# Patient Record
Sex: MALE | Race: WHITE | Employment: OTHER
[De-identification: names, ages, dates, MRNs, and addresses within clinical notes are randomized per-mention and may not be internally consistent; named-entity substitution may affect disease eponyms.]

---

## 2017-02-01 ENCOUNTER — OFFICE VISIT (OUTPATIENT)
Dept: INTERNAL MEDICINE | Facility: CLINIC | Age: 72
End: 2017-02-01

## 2017-02-01 VITALS
DIASTOLIC BLOOD PRESSURE: 80 MMHG | HEIGHT: 70 IN | RESPIRATION RATE: 14 BRPM | SYSTOLIC BLOOD PRESSURE: 120 MMHG | BODY MASS INDEX: 35.07 KG/M2 | WEIGHT: 245 LBS

## 2017-02-01 DIAGNOSIS — I10 ESSENTIAL HYPERTENSION: ICD-10-CM

## 2017-02-01 DIAGNOSIS — M48.061 SPINAL STENOSIS OF LUMBAR REGION AT MULTIPLE LEVELS: ICD-10-CM

## 2017-02-01 DIAGNOSIS — R07.89 ATYPICAL CHEST PAIN: ICD-10-CM

## 2017-02-01 DIAGNOSIS — Z12.11 ENCOUNTER FOR SCREENING COLONOSCOPY: Primary | ICD-10-CM

## 2017-02-01 DIAGNOSIS — I10 ESSENTIAL HYPERTENSION, BENIGN: ICD-10-CM

## 2017-02-01 DIAGNOSIS — G47.33 OSA (OBSTRUCTIVE SLEEP APNEA): Primary | ICD-10-CM

## 2017-02-01 PROCEDURE — 99213 OFFICE O/P EST LOW 20 MIN: CPT | Performed by: INTERNAL MEDICINE

## 2017-02-01 RX ORDER — AMLODIPINE BESYLATE 5 MG/1
5 TABLET ORAL DAILY
Qty: 90 TABLET | Refills: 3 | Status: SHIPPED | OUTPATIENT
Start: 2017-02-01 | End: 2017-10-23 | Stop reason: SDUPTHER

## 2017-02-01 RX ORDER — LABETALOL 100 MG/1
50 TABLET, FILM COATED ORAL 2 TIMES DAILY
Qty: 30 TABLET | Refills: 1 | Status: SHIPPED | OUTPATIENT
Start: 2017-02-01 | End: 2017-10-23 | Stop reason: SDUPTHER

## 2017-02-01 RX ORDER — VALSARTAN AND HYDROCHLOROTHIAZIDE 320; 25 MG/1; MG/1
1 TABLET, FILM COATED ORAL DAILY
Qty: 90 TABLET | Refills: 3 | Status: SHIPPED | OUTPATIENT
Start: 2017-02-01 | End: 2017-10-04 | Stop reason: SDUPTHER

## 2017-02-01 ASSESSMENT — ENCOUNTER SYMPTOMS
RESPIRATORY NEGATIVE: 1
BACK PAIN: 1
ABDOMINAL PAIN: 0

## 2017-02-04 RX ORDER — AMLODIPINE BESYLATE 5 MG/1
TABLET ORAL
Qty: 90 TABLET | Refills: 3 | Status: SHIPPED | OUTPATIENT
Start: 2017-02-04 | End: 2017-05-10 | Stop reason: CLARIF

## 2017-02-04 RX ORDER — LABETALOL 200 MG/1
TABLET, FILM COATED ORAL
Qty: 90 TABLET | Refills: 3 | Status: SHIPPED | OUTPATIENT
Start: 2017-02-04 | End: 2017-05-10 | Stop reason: ALTCHOICE

## 2017-03-15 DIAGNOSIS — Z12.11 COLON CANCER SCREENING: Primary | ICD-10-CM

## 2017-03-21 RX ORDER — POLYETHYLENE GLYCOL 3350 17 G/17G
POWDER, FOR SOLUTION ORAL
Qty: 255 G | Refills: 0 | Status: SHIPPED | OUTPATIENT
Start: 2017-03-21 | End: 2017-04-14

## 2017-05-10 ENCOUNTER — HOSPITAL ENCOUNTER (OUTPATIENT)
Age: 72
Discharge: HOME | End: 2017-05-10
Payer: MEDICARE

## 2017-05-10 ENCOUNTER — OFFICE VISIT (OUTPATIENT)
Dept: INTERNAL MEDICINE CLINIC | Age: 72
End: 2017-05-10
Payer: MEDICARE

## 2017-05-10 VITALS
HEIGHT: 70 IN | BODY MASS INDEX: 35.07 KG/M2 | DIASTOLIC BLOOD PRESSURE: 82 MMHG | SYSTOLIC BLOOD PRESSURE: 130 MMHG | WEIGHT: 245 LBS | RESPIRATION RATE: 14 BRPM

## 2017-05-10 DIAGNOSIS — R53.81 OTHER MALAISE AND FATIGUE: ICD-10-CM

## 2017-05-10 DIAGNOSIS — I10 ESSENTIAL HYPERTENSION, BENIGN: ICD-10-CM

## 2017-05-10 DIAGNOSIS — M48.061 SPINAL STENOSIS OF LUMBAR REGION AT MULTIPLE LEVELS: Primary | ICD-10-CM

## 2017-05-10 DIAGNOSIS — R53.83 OTHER MALAISE AND FATIGUE: ICD-10-CM

## 2017-05-10 DIAGNOSIS — I20.8 ANGINA OF EFFORT (HCC): ICD-10-CM

## 2017-05-10 LAB
-: NORMAL
ABSOLUTE EOS #: 0.2 K/UL (ref 0–0.4)
ABSOLUTE LYMPH #: 1.6 K/UL (ref 1–4.8)
ABSOLUTE MONO #: 0.5 K/UL (ref 0.1–1.2)
ALBUMIN SERPL-MCNC: 4.1 G/DL (ref 3.5–5.2)
ALBUMIN/GLOBULIN RATIO: 1.6 (ref 1–2.5)
ALP BLD-CCNC: 54 U/L (ref 40–129)
ALT SERPL-CCNC: 21 U/L (ref 5–41)
AMORPHOUS: NORMAL
ANION GAP SERPL CALCULATED.3IONS-SCNC: 11 MMOL/L (ref 9–17)
AST SERPL-CCNC: 22 U/L
BACTERIA: NORMAL
BASOPHILS # BLD: 1 %
BASOPHILS ABSOLUTE: 0 K/UL (ref 0–0.2)
BILIRUB SERPL-MCNC: 0.32 MG/DL (ref 0.3–1.2)
BILIRUBIN URINE: NEGATIVE
BUN BLDV-MCNC: 16 MG/DL (ref 8–23)
BUN/CREAT BLD: NORMAL (ref 9–20)
CALCIUM SERPL-MCNC: 9.1 MG/DL (ref 8.6–10.4)
CASTS UA: NORMAL /LPF (ref 0–8)
CHLORIDE BLD-SCNC: 101 MMOL/L (ref 98–107)
CHOLESTEROL/HDL RATIO: 5.5
CHOLESTEROL: 243 MG/DL
CO2: 25 MMOL/L (ref 20–31)
COLOR: YELLOW
CREAT SERPL-MCNC: 0.79 MG/DL (ref 0.7–1.2)
CRYSTALS, UA: NORMAL /HPF
DIFFERENTIAL TYPE: ABNORMAL
EOSINOPHILS RELATIVE PERCENT: 4 %
EPITHELIAL CELLS UA: NORMAL /HPF (ref 0–5)
GFR AFRICAN AMERICAN: >60 ML/MIN
GFR NON-AFRICAN AMERICAN: >60 ML/MIN
GFR SERPL CREATININE-BSD FRML MDRD: NORMAL ML/MIN/{1.73_M2}
GFR SERPL CREATININE-BSD FRML MDRD: NORMAL ML/MIN/{1.73_M2}
GLUCOSE BLD-MCNC: 97 MG/DL (ref 70–99)
GLUCOSE URINE: NEGATIVE
HCT VFR BLD CALC: 38.7 % (ref 41–53)
HDLC SERPL-MCNC: 44 MG/DL
HEMOGLOBIN: 12.8 G/DL (ref 13.5–17.5)
KETONES, URINE: NEGATIVE
LDL CHOLESTEROL: 158 MG/DL (ref 0–130)
LEUKOCYTE ESTERASE, URINE: NEGATIVE
LYMPHOCYTES # BLD: 32 %
MCH RBC QN AUTO: 26.3 PG (ref 26–34)
MCHC RBC AUTO-ENTMCNC: 33.2 G/DL (ref 31–37)
MCV RBC AUTO: 79.3 FL (ref 80–100)
MONOCYTES # BLD: 9 %
MUCUS: NORMAL
NITRITE, URINE: NEGATIVE
OTHER OBSERVATIONS UA: NORMAL
PDW BLD-RTO: 14.9 % (ref 12.5–15.4)
PH UA: 7 (ref 5–8)
PLATELET # BLD: 239 K/UL (ref 140–450)
PLATELET ESTIMATE: ABNORMAL
PMV BLD AUTO: 8.2 FL (ref 6–12)
POTASSIUM SERPL-SCNC: 4 MMOL/L (ref 3.7–5.3)
PROTEIN UA: NEGATIVE
RBC # BLD: 4.87 M/UL (ref 4.5–5.9)
RBC # BLD: ABNORMAL 10*6/UL
RBC UA: NORMAL /HPF (ref 0–4)
RENAL EPITHELIAL, UA: NORMAL /HPF
SEDIMENTATION RATE, ERYTHROCYTE: 7 MM (ref 0–10)
SEG NEUTROPHILS: 54 %
SEGMENTED NEUTROPHILS ABSOLUTE COUNT: 2.7 K/UL (ref 1.8–7.7)
SODIUM BLD-SCNC: 137 MMOL/L (ref 135–144)
SPECIFIC GRAVITY UA: 1.01 (ref 1–1.03)
TOTAL PROTEIN: 6.6 G/DL (ref 6.4–8.3)
TRICHOMONAS: NORMAL
TRIGL SERPL-MCNC: 204 MG/DL
TSH SERPL DL<=0.05 MIU/L-ACNC: 1.59 MIU/L (ref 0.3–5)
TURBIDITY: CLEAR
URINE HGB: NEGATIVE
UROBILINOGEN, URINE: NORMAL
VLDLC SERPL CALC-MCNC: ABNORMAL MG/DL (ref 1–30)
WBC # BLD: 5 K/UL (ref 3.5–11)
WBC # BLD: ABNORMAL 10*3/UL
WBC UA: NORMAL /HPF (ref 0–5)
YEAST: NORMAL

## 2017-05-10 PROCEDURE — 3288F FALL RISK ASSESSMENT DOCD: CPT | Performed by: INTERNAL MEDICINE

## 2017-05-10 PROCEDURE — G8510 SCR DEP NEG, NO PLAN REQD: HCPCS | Performed by: INTERNAL MEDICINE

## 2017-05-10 PROCEDURE — 99214 OFFICE O/P EST MOD 30 MIN: CPT | Performed by: INTERNAL MEDICINE

## 2017-05-10 ASSESSMENT — ENCOUNTER SYMPTOMS
ABDOMINAL PAIN: 0
BACK PAIN: 1
SHORTNESS OF BREATH: 1

## 2017-05-10 ASSESSMENT — PATIENT HEALTH QUESTIONNAIRE - PHQ9
SUM OF ALL RESPONSES TO PHQ9 QUESTIONS 1 & 2: 0
2. FEELING DOWN, DEPRESSED OR HOPELESS: 0
SUM OF ALL RESPONSES TO PHQ QUESTIONS 1-9: 0
1. LITTLE INTEREST OR PLEASURE IN DOING THINGS: 0

## 2017-07-10 RX ORDER — DULOXETIN HYDROCHLORIDE 30 MG/1
CAPSULE, DELAYED RELEASE ORAL
Qty: 30 CAPSULE | Refills: 5 | Status: SHIPPED | OUTPATIENT
Start: 2017-07-10 | End: 2017-09-07

## 2017-07-27 ENCOUNTER — HOSPITAL ENCOUNTER (OUTPATIENT)
Age: 72
Setting detail: SPECIMEN
Discharge: HOME | End: 2017-07-27
Payer: MEDICARE

## 2017-07-27 LAB — PROSTATE SPECIFIC ANTIGEN: 0.58 UG/L

## 2017-09-07 ENCOUNTER — OFFICE VISIT (OUTPATIENT)
Dept: INTERNAL MEDICINE CLINIC | Age: 72
End: 2017-09-07
Payer: MEDICARE

## 2017-09-07 VITALS
WEIGHT: 248 LBS | BODY MASS INDEX: 35.5 KG/M2 | SYSTOLIC BLOOD PRESSURE: 158 MMHG | DIASTOLIC BLOOD PRESSURE: 84 MMHG | HEIGHT: 70 IN

## 2017-09-07 DIAGNOSIS — R42 VERTIGO: Primary | ICD-10-CM

## 2017-09-07 DIAGNOSIS — R53.81 MALAISE AND FATIGUE: ICD-10-CM

## 2017-09-07 DIAGNOSIS — G47.33 OSA (OBSTRUCTIVE SLEEP APNEA): ICD-10-CM

## 2017-09-07 DIAGNOSIS — I10 ESSENTIAL HYPERTENSION, BENIGN: ICD-10-CM

## 2017-09-07 DIAGNOSIS — R53.83 MALAISE AND FATIGUE: ICD-10-CM

## 2017-09-07 PROCEDURE — 99214 OFFICE O/P EST MOD 30 MIN: CPT | Performed by: INTERNAL MEDICINE

## 2017-09-07 RX ORDER — MECLIZINE HYDROCHLORIDE 25 MG/1
25 TABLET ORAL 3 TIMES DAILY PRN
Qty: 90 TABLET | Refills: 0 | Status: SHIPPED | OUTPATIENT
Start: 2017-09-07 | End: 2017-10-07

## 2017-09-07 ASSESSMENT — ENCOUNTER SYMPTOMS
COUGH: 0
ABDOMINAL DISTENTION: 0
TROUBLE SWALLOWING: 0
COLOR CHANGE: 0
SHORTNESS OF BREATH: 0
WHEEZING: 0
DIARRHEA: 0
EYE PAIN: 0
EYE DISCHARGE: 0
BLOOD IN STOOL: 0

## 2017-10-04 ENCOUNTER — OFFICE VISIT (OUTPATIENT)
Dept: INTERNAL MEDICINE CLINIC | Age: 72
End: 2017-10-04
Payer: MEDICARE

## 2017-10-04 VITALS
SYSTOLIC BLOOD PRESSURE: 142 MMHG | OXYGEN SATURATION: 95 % | DIASTOLIC BLOOD PRESSURE: 86 MMHG | HEART RATE: 90 BPM | WEIGHT: 248 LBS | BODY MASS INDEX: 35.5 KG/M2 | HEIGHT: 70 IN

## 2017-10-04 DIAGNOSIS — I10 ESSENTIAL HYPERTENSION: ICD-10-CM

## 2017-10-04 DIAGNOSIS — I10 ESSENTIAL HYPERTENSION, BENIGN: Primary | ICD-10-CM

## 2017-10-04 DIAGNOSIS — R06.2 WHEEZING: ICD-10-CM

## 2017-10-04 DIAGNOSIS — J61 PULMONARY ASBESTOSIS (HCC): ICD-10-CM

## 2017-10-04 DIAGNOSIS — R42 VERTIGO: ICD-10-CM

## 2017-10-04 DIAGNOSIS — L91.8 SKIN TAG: ICD-10-CM

## 2017-10-04 DIAGNOSIS — L98.9 SKIN ABNORMALITIES: ICD-10-CM

## 2017-10-04 DIAGNOSIS — Z23 NEED FOR INFLUENZA VACCINATION: ICD-10-CM

## 2017-10-04 PROCEDURE — 99214 OFFICE O/P EST MOD 30 MIN: CPT | Performed by: INTERNAL MEDICINE

## 2017-10-04 PROCEDURE — 90662 IIV NO PRSV INCREASED AG IM: CPT | Performed by: INTERNAL MEDICINE

## 2017-10-04 PROCEDURE — G0008 ADMIN INFLUENZA VIRUS VAC: HCPCS | Performed by: INTERNAL MEDICINE

## 2017-10-04 RX ORDER — CLOBETASOL PROPIONATE 0.5 MG/G
CREAM TOPICAL
Qty: 60 G | Refills: 2 | Status: SHIPPED | OUTPATIENT
Start: 2017-10-04 | End: 2017-12-21

## 2017-10-04 RX ORDER — VALSARTAN AND HYDROCHLOROTHIAZIDE 320; 25 MG/1; MG/1
1 TABLET, FILM COATED ORAL DAILY
Qty: 90 TABLET | Refills: 3 | Status: SHIPPED | OUTPATIENT
Start: 2017-10-04 | End: 2017-12-21

## 2017-10-04 ASSESSMENT — ENCOUNTER SYMPTOMS
DIARRHEA: 0
WHEEZING: 1
EYE PAIN: 0
TROUBLE SWALLOWING: 0
ABDOMINAL DISTENTION: 0
COUGH: 0
COLOR CHANGE: 0
SHORTNESS OF BREATH: 0
BLOOD IN STOOL: 0
EYE DISCHARGE: 0

## 2017-10-04 NOTE — MR AVS SNAPSHOT
Degeneration of lumbar or lumbosacral intervertebral disc      Immunizations as of 10/4/2017     Name Date    Influenza, High Dose 10/4/2017    Pneumococcal 13-valent Conjugate (Cymecdt24) 7/28/2015    Pneumococcal Conjugate 7-valent 7/26/2006    Pneumococcal Polysaccharide ([id number]) 9/14/2016      Preventive Care        Date Due    One-time abdominal aortic aneurism (AAA) screening is recommended for all men between the age of 73-68 who have ever smoked 1945    Tetanus Combination Vaccine (1 - Tdap) 4/18/1962    Zoster Vaccine 4/18/2003    Cholesterol Screening 5/10/2022    Colonoscopy 5/18/2027            MyChart Signup           [employer] allows you to send messages to your doctor, view your test results, renew your prescriptions, schedule appointments, view visit notes, and more. How Do I Sign Up? 1. In your Internet browser, go to [email or web address]  2. Click on the Sign Up Now link in the Sign In box. You will see the New Member Sign Up page. 3. Enter your [employer] Access Code exactly as it appears below. You will not need to use this code after youve completed the sign-up process. If you do not sign up before the expiration date, you must request a new code. [employer] Access Code: [email or web address]  Expires: 11/6/2017  1:17 PM    4. Enter your Social Security Number ([id number]) and Date of Birth (mm/dd/yyyy) as indicated and click Submit. You will be taken to the next sign-up page. 5. Create a [facility] ID. This will be your [facility] login ID and cannot be changed, so think of one that is secure and easy to remember. 6. Create a [facility] password. You can change your password at any time. 7. Enter your Password Reset Question and Answer. This can be used at a later time if you forget your password. 8. Enter your e-mail address. You will receive e-mail notification when new information is available in [id number] 9. Click Sign Up. You can now view your medical record.

## 2017-10-04 NOTE — PROGRESS NOTES
Subjective:      HYPERTENSION visit     BP Readings from Last 3 Encounters:   10/04/17 (!) 142/86   09/07/17 (!) 158/84   05/10/17 130/82       LDL Calculated (mg/dL)   Date Value   02/11/2016 213 (A)     LDL Cholesterol (mg/dL)   Date Value   05/10/2017 158 (H)     HDL (mg/dL)   Date Value   05/10/2017 44     BUN (mg/dL)   Date Value   05/10/2017 16     CREATININE (mg/dL)   Date Value   05/10/2017 0.79     Glucose (mg/dL)   Date Value   05/10/2017 97   03/01/2012 102              Have you changed or started any medications since your last visit including any over-the-counter medicines, vitamins, or herbal medicines? no   Have you stopped taking any of your medications? Is so, why? -  no  Are you having any side effects from any of your medications? - no    Have you seen any other physician or provider since your last visit?  no  Have you had any other diagnostic tests since your last visit?  no   Have you been seen in the emergency room and/or had an admission in a hospital since we last saw you?  no   Have you had your routine dental cleaning in the past 6 months?  no     Do you have an active MyChart account? If no, what is the barrier? No: pending    Patient Care Team:  Sal Philip MD as PCP - General (Internal Medicine)  Sal Philip MD as PCP - S Attributed Provider    Medical History Review  Past Medical, Family, and Social History reviewed and does not contribute to the patient presenting condition    Health Maintenance   Topic Date Due    AAA screen  1945    DTaP/Tdap/Td vaccine (1 - Tdap) 04/18/1962    Zostavax vaccine  04/18/2003    Flu vaccine (1) 09/01/2017    Lipid screen  05/10/2022    Colon cancer screen colonoscopy  05/18/2027    Pneumococcal low/med risk  Completed       Patient ID: Jason Cruz is a 74 y.o. male.     HPI Comments: HTN  Onset more than 2 years ago  oniel mild to mod  Controlled with current po meds  Not associated with headaches or blurry vision  No sounds are normal. He exhibits no distension. There is no tenderness. There is no rebound. Musculoskeletal: Normal range of motion. He exhibits no edema or tenderness. Lymphadenopathy:        Head (right side): No submental and no submandibular adenopathy present. Head (left side): No submental and no submandibular adenopathy present. He has no cervical adenopathy. Neurological: He is alert and oriented to person, place, and time. He displays no atrophy. No cranial nerve deficit or sensory deficit. He exhibits normal muscle tone. Coordination normal.   Skin: Skin is warm. No bruising, no ecchymosis and no rash noted. He is not diaphoretic. No pallor. Psychiatric: He has a normal mood and affect. His behavior is normal. His mood appears not anxious. His affect is not angry. His speech is not slurred. He is not aggressive. Cognition and memory are not impaired. He expresses no homicidal ideation. I have personally reviewed and agree with the patient ROS, HPI and Conception Call is a 74 y.o. male who presents today for follow up on his chronic medical conditions as noted below.   Ean Barajas is c/o of   Chief Complaint   Patient presents with    Hypertension     management    Other     hcc gap    Dizziness     pt states he was unable to take antivert, but has appt with ENT on 10/12/17       Patient Active Problem List:     Malaise and fatigue     Other psoriasis     Other and unspecified hyperlipidemia     Essential hypertension, benign     Thoracic or lumbosacral neuritis or radiculitis, unspecified     Vitamin D deficiency     Degeneration of lumbar or lumbosacral intervertebral disc     Pulmonary asbestosis ([facility] [address]     Knee osteoarthritis     Lumbosacral radiculopathy     Other spondylosis with radiculopathy, lumbar region     Spinal stenosis of lumbar region at multiple levels     Primary osteoarthritis of right knee     TARA (obstructive sleep apnea)     Acute sinusitis Past Medical History:   Diagnosis Date    Acute gouty arthropathy     Backache, unspecified     Cellulitis and abscess of upper arm and forearm     Degeneration of lumbar or lumbosacral intervertebral disc     Essential hypertension, benign     Obstructive sleep apnea on CPAP     Osteoarthrosis, unspecified whether generalized or localized, unspecified site     Other and unspecified hyperlipidemia     Other malaise and fatigue     Other psoriasis     Sleep apnea     Thoracic or lumbosacral neuritis or radiculitis, unspecified     Unspecified vitamin D deficiency       Past Surgical History:   Procedure Laterality Date    JOINT REPLACEMENT Right Oct.6, 2015    right knee    SHOULDER SURGERY Bilateral rotator cuff    TONSILLECTOMY AND ADENOIDECTOMY      TURP      VASECTOMY       Family History   Problem Relation Age of Onset    Cancer Father      Current Outpatient Prescriptions   Medication Sig Dispense Refill    valsartan-hydrochlorothiazide (DIOVAN-HCT) 320-25 MG per tablet Take 1 tablet by mouth daily 90 tablet 3    clobetasol (TEMOVATE) 0.05 % cream Apply topically 2 times daily. 60 g 2    meclizine (ANTIVERT) 25 MG tablet Take 1 tablet by mouth 3 times daily as needed for Dizziness 90 tablet 0    labetalol (NORMODYNE) 100 MG tablet Take 0.5 tablets by mouth 2 times daily 30 tablet 1    amLODIPine (NORVASC) 5 MG tablet Take 1 tablet by mouth daily 90 tablet 3    clobetasol (TEMOVATE) 0.05 % cream Apply topically 2 times daily. 1 Tube 3    TURMERIC PO Take by mouth      MULTIPLE VITAMIN PO Take by mouth daily      aspirin 81 MG tablet Take 81 mg by mouth daily.  tamsulosin (FLOMAX) 0.4 MG capsule Take 0.4 mg by mouth daily.  dutasteride (AVODART) 0.5 MG capsule Take 0.5 mg by mouth daily.  Vitamin D (CHOLECALCIFEROL) 1000 UNITS CAPS capsule Take 10,000 Units by mouth daily.  diclofenac sodium (VOLTAREN) 1 % GEL Apply 2 g topically 2 times daily.        No current % cream   8. Wheezing  FULL PFT STUDY WITH PRE AND POST               Plan:      1. Need for influenza vaccination    - INFLUENZA, HIGH DOSE, 65 YRS +, IM, PF, PREFILL SYR, 0.5ML (FLUZONE HD)    2. Pulmonary asbestosis ([address]  stabel no dyspena      No Follow-up on file. Orders Placed This Encounter   Procedures    INFLUENZA, HIGH DOSE, 65 YRS +, IM, PF, PREFILL SYR, 0.5ML (FLUZONE HD)    FULL PFT STUDY WITH PRE AND POST     Standing Status:   Future     Standing Expiration Date:   10/4/2018     Scheduling Instructions:      [place] asthma     Orders Placed This Encounter   Medications    valsartan-hydrochlorothiazide (DIOVAN-HCT) 320-25 MG per tablet     Sig: Take 1 tablet by mouth daily     Dispense:  90 tablet     Refill:  3    clobetasol (TEMOVATE) 0.05 % cream     Sig: Apply topically 2 times daily.      Dispense:  60 g     Refill:  2         Will remove skin tag  Next vist on Friday  Pt to make atp

## 2017-10-23 ENCOUNTER — HOSPITAL ENCOUNTER (OUTPATIENT)
Age: 72
Discharge: HOME | End: 2017-10-23
Payer: MEDICARE

## 2017-10-23 LAB
CREAT SERPL-MCNC: 0.76 MG/DL (ref 0.7–1.2)
GFR AFRICAN AMERICAN: >60 ML/MIN
GFR NON-AFRICAN AMERICAN: >60 ML/MIN
GFR SERPL CREATININE-BSD FRML MDRD: NORMAL ML/MIN/{1.73_M2}
GFR SERPL CREATININE-BSD FRML MDRD: NORMAL ML/MIN/{1.73_M2}

## 2017-10-23 PROCEDURE — 36415 COLL VENOUS BLD VENIPUNCTURE: CPT

## 2017-10-23 PROCEDURE — 82565 ASSAY OF CREATININE: CPT

## 2017-10-24 RX ORDER — AMLODIPINE BESYLATE 5 MG/1
5 TABLET ORAL DAILY
Qty: 90 TABLET | Refills: 3 | Status: SHIPPED | OUTPATIENT
Start: 2017-10-24 | End: 2017-12-21

## 2017-10-24 RX ORDER — LABETALOL 100 MG/1
50 TABLET, FILM COATED ORAL 2 TIMES DAILY
Qty: 180 TABLET | Refills: 3 | Status: SHIPPED | OUTPATIENT
Start: 2017-10-24 | End: 2017-12-21

## 2017-10-27 ENCOUNTER — PROCEDURE VISIT (OUTPATIENT)
Dept: INTERNAL MEDICINE CLINIC | Age: 72
End: 2017-10-27
Payer: MEDICARE

## 2017-10-27 VITALS
SYSTOLIC BLOOD PRESSURE: 130 MMHG | BODY MASS INDEX: 35.22 KG/M2 | OXYGEN SATURATION: 96 % | HEART RATE: 83 BPM | DIASTOLIC BLOOD PRESSURE: 70 MMHG | HEIGHT: 70 IN | WEIGHT: 246 LBS

## 2017-10-27 DIAGNOSIS — L91.8 SKIN TAG: Primary | ICD-10-CM

## 2017-10-27 DIAGNOSIS — L98.9 PAINFUL SKIN LESION: ICD-10-CM

## 2017-10-27 PROCEDURE — 11200 RMVL SKIN TAGS UP TO&INC 15: CPT | Performed by: INTERNAL MEDICINE

## 2017-10-27 NOTE — PROGRESS NOTES
neuritis or radiculitis, unspecified     Vitamin D deficiency     Degeneration of lumbar or lumbosacral intervertebral disc     Pulmonary asbestosis ([address]     Knee osteoarthritis     Lumbosacral radiculopathy     Other spondylosis with radiculopathy, lumbar region     Spinal stenosis of lumbar region at multiple levels     Primary osteoarthritis of right knee     TARA (obstructive sleep apnea)     Acute sinusitis     Past Medical History:   Diagnosis Date    Acute gouty arthropathy     Backache, unspecified     Cellulitis and abscess of upper arm and forearm     Degeneration of lumbar or lumbosacral intervertebral disc     Essential hypertension, benign     Obstructive sleep apnea on CPAP     Osteoarthrosis, unspecified whether generalized or localized, unspecified site     Other and unspecified hyperlipidemia     Other malaise and fatigue     Other psoriasis     Sleep apnea     Thoracic or lumbosacral neuritis or radiculitis, unspecified     Unspecified vitamin D deficiency       Past Surgical History:   Procedure Laterality Date    JOINT REPLACEMENT Right Oct.6, 2015    right knee    SHOULDER SURGERY Bilateral rotator cuff    TONSILLECTOMY AND ADENOIDECTOMY      TURP      VASECTOMY       Family History   Problem Relation Age of Onset    Cancer Father      Current Outpatient Prescriptions   Medication Sig Dispense Refill    amLODIPine (NORVASC) 5 MG tablet Take 1 tablet by mouth daily 90 tablet 3    labetalol (NORMODYNE) 100 MG tablet Take 0.5 tablets by mouth 2 times daily 180 tablet 3    valsartan-hydrochlorothiazide (DIOVAN-HCT) 320-25 MG per tablet Take 1 tablet by mouth daily 90 tablet 3    clobetasol (TEMOVATE) 0.05 % cream Apply topically 2 times daily. 60 g 2    clobetasol (TEMOVATE) 0.05 % cream Apply topically 2 times daily. 1 Tube 3    TURMERIC PO Take by mouth      MULTIPLE VITAMIN PO Take by mouth daily      aspirin 81 MG tablet Take 81 mg by mouth daily.       tamsulosin types were placed in this encounter.     Two skin tags in neck and et shoulder  And onewart on chest  All shve excixed  Not sent for bx  Non is clincaly malignant  Pt agrees   asepit tech  Under local  With verbal consent

## 2017-10-31 ENCOUNTER — HOSPITAL ENCOUNTER (OUTPATIENT)
Dept: MRI IMAGING | Facility: CLINIC | Age: 72
Discharge: HOME | End: 2017-10-31
Payer: MEDICARE

## 2017-10-31 DIAGNOSIS — H81.10 BENIGN PAROXYSMAL VERTIGO, UNSPECIFIED LATERALITY: ICD-10-CM

## 2017-10-31 DIAGNOSIS — H83.2X2 LABYRINTHINE DYSFUNCTION OF LEFT EAR: ICD-10-CM

## 2017-10-31 DIAGNOSIS — H90.3 SENSORINEURAL HEARING LOSS, BILATERAL: ICD-10-CM

## 2017-10-31 DIAGNOSIS — R42 DIZZINESS AND GIDDINESS: ICD-10-CM

## 2017-10-31 PROCEDURE — 2580000003 HC RX 258: Performed by: OTOLARYNGOLOGY

## 2017-10-31 PROCEDURE — 6360000004 HC RX CONTRAST MEDICATION: Performed by: OTOLARYNGOLOGY

## 2017-10-31 PROCEDURE — 70553 MRI BRAIN STEM W/O & W/DYE: CPT

## 2017-10-31 PROCEDURE — A9579 GAD-BASE MR CONTRAST NOS,1ML: HCPCS | Performed by: OTOLARYNGOLOGY

## 2017-10-31 RX ORDER — SODIUM CHLORIDE 0.9 % (FLUSH) 0.9 %
10 SYRINGE (ML) INJECTION 2 TIMES DAILY
Status: DISCONTINUED | OUTPATIENT
Start: 2017-10-31 | End: 2017-11-03 | Stop reason: HOSPADM

## 2017-10-31 RX ADMIN — Medication 10 ML: at 10:21

## 2017-10-31 RX ADMIN — GADOPENTETATE DIMEGLUMINE 20 ML: 469.01 INJECTION INTRAVENOUS at 10:20

## 2017-12-21 ENCOUNTER — OFFICE VISIT (OUTPATIENT)
Dept: INTERNAL MEDICINE CLINIC | Age: 72
End: 2017-12-21
Payer: MEDICARE

## 2017-12-21 VITALS
HEIGHT: 70 IN | SYSTOLIC BLOOD PRESSURE: 138 MMHG | BODY MASS INDEX: 32.5 KG/M2 | WEIGHT: 227 LBS | OXYGEN SATURATION: 96 % | HEART RATE: 66 BPM | DIASTOLIC BLOOD PRESSURE: 80 MMHG

## 2017-12-21 DIAGNOSIS — I25.119 CORONARY ARTERY DISEASE INVOLVING NATIVE CORONARY ARTERY OF NATIVE HEART WITH ANGINA PECTORIS (HCC): Primary | ICD-10-CM

## 2017-12-21 DIAGNOSIS — F41.9 ANXIETY: ICD-10-CM

## 2017-12-21 DIAGNOSIS — I10 ESSENTIAL HYPERTENSION, BENIGN: ICD-10-CM

## 2017-12-21 DIAGNOSIS — M48.061 SPINAL STENOSIS OF LUMBAR REGION AT MULTIPLE LEVELS: ICD-10-CM

## 2017-12-21 DIAGNOSIS — M17.11 PRIMARY OSTEOARTHRITIS OF RIGHT KNEE: ICD-10-CM

## 2017-12-21 PROCEDURE — 99214 OFFICE O/P EST MOD 30 MIN: CPT | Performed by: INTERNAL MEDICINE

## 2017-12-21 RX ORDER — ATORVASTATIN CALCIUM 80 MG/1
80 TABLET, FILM COATED ORAL DAILY
Qty: 30 TABLET | Refills: 6
Start: 2017-12-21 | End: 2020-11-10 | Stop reason: SDUPTHER

## 2017-12-21 RX ORDER — CARVEDILOL 12.5 MG/1
12.5 TABLET ORAL 2 TIMES DAILY WITH MEALS
Qty: 60 TABLET | Refills: 1
Start: 2017-12-21 | End: 2020-11-10 | Stop reason: SDUPTHER

## 2017-12-21 RX ORDER — LISINOPRIL 5 MG/1
TABLET ORAL
COMMUNITY
Start: 2017-11-25 | End: 2020-02-05 | Stop reason: SDUPTHER

## 2017-12-21 ASSESSMENT — ENCOUNTER SYMPTOMS
EYE DISCHARGE: 0
BLOOD IN STOOL: 0
TROUBLE SWALLOWING: 0
EYE PAIN: 0
DIARRHEA: 0
COLOR CHANGE: 0
COUGH: 0
ABDOMINAL DISTENTION: 0
SHORTNESS OF BREATH: 0
WHEEZING: 0

## 2017-12-21 NOTE — PROGRESS NOTES
Negative for cough, shortness of breath and wheezing. Cardiovascular: Negative for chest pain and palpitations. Gastrointestinal: Negative for abdominal distention, blood in stool and diarrhea. Endocrine: Negative for polydipsia and polyphagia. Genitourinary: Negative for difficulty urinating and frequency. Musculoskeletal: Negative for gait problem, myalgias and neck pain. Skin: Negative for color change and rash. Allergic/Immunologic: Negative for environmental allergies and food allergies. Neurological: Negative for dizziness and headaches. Hematological: Negative for adenopathy. Does not bruise/bleed easily. Psychiatric/Behavioral: Negative for behavioral problems and sleep disturbance. Objective:   Physical Exam   Constitutional: He is oriented to person, place, and time. He appears well-developed and well-nourished. HENT:   Head: Normocephalic and atraumatic. Eyes: Conjunctivae and EOM are normal. Right eye exhibits no discharge. Left eye exhibits no discharge. Right conjunctiva is not injected. Left conjunctiva is not injected. Right eye exhibits normal extraocular motion. Left eye exhibits normal extraocular motion. Neck: Normal range of motion. Neck supple. No JVD present. No edema and no erythema present. No thyroid mass and no thyromegaly present. Cardiovascular: Normal rate and regular rhythm. Exam reveals no friction rub. No murmur heard. Pulmonary/Chest: Effort normal and breath sounds normal. No accessory muscle usage. No tachypnea and no bradypnea. No respiratory distress. He has no wheezes. He has no rales. Abdominal: Soft. Bowel sounds are normal. He exhibits no distension. There is no tenderness. There is no rebound. Musculoskeletal: Normal range of motion. He exhibits no edema or tenderness. Lymphadenopathy:        Head (right side): No submental and no submandibular adenopathy present.         Head (left side): No submental and no submandibular Obstructive sleep apnea on CPAP     Osteoarthrosis, unspecified whether generalized or localized, unspecified site     Other and unspecified hyperlipidemia     Other malaise and fatigue     Other psoriasis     Sleep apnea     Thoracic or lumbosacral neuritis or radiculitis, unspecified     Unspecified vitamin D deficiency       Past Surgical History:   Procedure Laterality Date    JOINT REPLACEMENT Right Oct.6, 2015    right knee    SHOULDER SURGERY Bilateral rotator cuff    TONSILLECTOMY AND ADENOIDECTOMY      TURP      VASECTOMY       Family History   Problem Relation Age of Onset    Cancer Father      Current Outpatient Prescriptions   Medication Sig Dispense Refill    lisinopril (PRINIVIL;ZESTRIL) 5 MG tablet       carvedilol (COREG) 12.5 MG tablet Take 1 tablet by mouth 2 times daily (with meals) 60 tablet 1    ticagrelor (BRILINTA) 90 MG TABS tablet Take 1 tablet by mouth 2 times daily 60 tablet 1    atorvastatin (LIPITOR) 80 MG tablet Take 1 tablet by mouth daily 30 tablet 6    TURMERIC PO Take by mouth      MULTIPLE VITAMIN PO Take by mouth daily      aspirin 81 MG tablet Take 81 mg by mouth daily.  tamsulosin (FLOMAX) 0.4 MG capsule Take 0.4 mg by mouth daily.  dutasteride (AVODART) 0.5 MG capsule Take 0.5 mg by mouth daily.  Vitamin D (CHOLECALCIFEROL) 1000 UNITS CAPS capsule Take 10,000 Units by mouth daily.  diclofenac sodium (VOLTAREN) 1 % GEL Apply 2 g topically 2 times daily. No current facility-administered medications for this visit. ALLERGIES:    Allergies   Allergen Reactions    Mobic [Meloxicam] Shortness Of Breath    Nsaids Shortness Of Breath       Social History   Substance Use Topics    Smoking status: Former Smoker     Packs/day: 2.00     Years: 20.00     Quit date: 11/1/1970    Smokeless tobacco: Never Used    Alcohol use No      Body mass index is 32.5 kg/m².   /80   Pulse 66   Ht 5' 10.08\" (1.78 m)   Wt 227 lb (103 kg)

## 2018-03-07 ENCOUNTER — OFFICE VISIT (OUTPATIENT)
Dept: INTERNAL MEDICINE CLINIC | Age: 73
End: 2018-03-07
Payer: MEDICARE

## 2018-03-07 VITALS
DIASTOLIC BLOOD PRESSURE: 72 MMHG | HEART RATE: 64 BPM | HEIGHT: 70 IN | SYSTOLIC BLOOD PRESSURE: 135 MMHG | BODY MASS INDEX: 31.21 KG/M2 | WEIGHT: 218 LBS

## 2018-03-07 DIAGNOSIS — I10 ESSENTIAL HYPERTENSION, BENIGN: Primary | ICD-10-CM

## 2018-03-07 DIAGNOSIS — I67.841 REVERSIBLE CEREBROVASCULAR VASOCONSTRICTION SYNDROME: ICD-10-CM

## 2018-03-07 DIAGNOSIS — I25.119 ATHEROSCLEROSIS OF NATIVE CORONARY ARTERY OF NATIVE HEART WITH ANGINA PECTORIS (HCC): ICD-10-CM

## 2018-03-07 DIAGNOSIS — I25.119 CORONARY ARTERY DISEASE INVOLVING NATIVE CORONARY ARTERY OF NATIVE HEART WITH ANGINA PECTORIS (HCC): ICD-10-CM

## 2018-03-07 DIAGNOSIS — I21.4 NSTEMI (NON-ST ELEVATED MYOCARDIAL INFARCTION) (HCC): ICD-10-CM

## 2018-03-07 DIAGNOSIS — J61 PULMONARY ASBESTOSIS (HCC): ICD-10-CM

## 2018-03-07 PROBLEM — E78.2 MIXED DYSLIPIDEMIA: Status: ACTIVE | Noted: 2017-11-08

## 2018-03-07 PROBLEM — I11.9 HYPERTENSIVE HEART DISEASE WITHOUT HEART FAILURE: Status: ACTIVE | Noted: 2017-11-08

## 2018-03-07 PROBLEM — R06.02 SOB (SHORTNESS OF BREATH): Status: ACTIVE | Noted: 2017-12-01

## 2018-03-07 PROBLEM — I49.3 PVC (PREMATURE VENTRICULAR CONTRACTION): Status: ACTIVE | Noted: 2017-12-01

## 2018-03-07 PROBLEM — I25.5 ISCHEMIC CARDIOMYOPATHY: Status: ACTIVE | Noted: 2017-11-08

## 2018-03-07 PROCEDURE — 99214 OFFICE O/P EST MOD 30 MIN: CPT | Performed by: INTERNAL MEDICINE

## 2018-03-07 RX ORDER — MULTIVITAMIN WITH IRON
250 TABLET ORAL DAILY
COMMUNITY

## 2018-03-07 RX ORDER — UBIDECARENONE 100 MG
100 CAPSULE ORAL DAILY
COMMUNITY
End: 2021-03-10 | Stop reason: ALTCHOICE

## 2018-03-07 ASSESSMENT — ENCOUNTER SYMPTOMS
COUGH: 0
WHEEZING: 0
EYE DISCHARGE: 0
DIARRHEA: 0
ABDOMINAL DISTENTION: 0
COLOR CHANGE: 0
EYE PAIN: 0
SHORTNESS OF BREATH: 0
BLOOD IN STOOL: 0
TROUBLE SWALLOWING: 0

## 2018-03-07 NOTE — PROGRESS NOTES
Subjective:      Patient ID: Renzo Daniel is a 74 y.o. male. Chronic Disease Visit Information    BP Readings from Last 3 Encounters:   12/21/17 138/80   10/27/17 130/70   10/04/17 (!) 142/86          Hemoglobin A1C (%)   Date Value   07/03/2013 5.4     LDL Cholesterol (mg/dL)   Date Value   05/10/2017 158 (H)     LDL Calculated (mg/dL)   Date Value   02/11/2016 213 (A)     HDL (mg/dL)   Date Value   05/10/2017 44     BUN (mg/dL)   Date Value   05/10/2017 16     CREATININE (mg/dL)   Date Value   10/23/2017 0.76     Glucose (mg/dL)   Date Value   05/10/2017 97   03/01/2012 102            Have you changed or started any medications since your last visit including any over-the-counter medicines, vitamins, or herbal medicines? no   Are you having any side effects from any of your medications? -  no  Have you stopped taking any of your medications? Is so, why? -  no    Have you seen any other physician or provider since your last visit? Yes - Records Obtained  Have you had any other diagnostic tests since your last visit? Yes - Records Obtained  Have you been seen in the emergency room and/or had an admission to a hospital since we last saw you? No  Have you had your annual diabetic retinal (eye) exam? No  Have you had your routine dental cleaning in the past 6 months? no    Have you activated your [employer] account? If not, what are your barriers?  Yes     Patient Care Team:  Marisel Lange MD as PCP - General (Internal Medicine)  Marisel Lange MD as PCP - [facility] Attributed Provider         Medical History Review  Past Medical, Family, and Social History reviewed and does contribute to the patient presenting condition  Chief Complaint   Patient presents with    Coronary Artery Disease     recent STEMI -     Hypertension     management     Sleep Apnea     using cpap     Other     risk assessment      Health Maintenance   Topic Date Due    AAA screen  1945    DTaP/Tdap/Td vaccine (1 - Tdap) 04/18/1962 lumbar or lumbosacral intervertebral disc     Pulmonary asbestosis (HCC)     Knee osteoarthritis     Lumbosacral radiculopathy     Other spondylosis with radiculopathy, lumbar region     Spinal stenosis of lumbar region at multiple levels     Primary osteoarthritis of right knee     TARA (obstructive sleep apnea)     Acute sinusitis     Coronary artery disease involving native coronary artery of native heart with angina pectoris (HCC)     NSTEMI (non-ST elevated myocardial infarction) ([facility] [address]     Mixed dyslipidemia     Ischemic cardiomyopathy     Hypertensive heart disease without heart failure     SOB (shortness of breath)     PVC (premature ventricular contraction)     Past Medical History:   Diagnosis Date    Acute gouty arthropathy     Backache, unspecified     Cellulitis and abscess of upper arm and forearm     Degeneration of lumbar or lumbosacral intervertebral disc     Essential hypertension, benign     Obstructive sleep apnea on CPAP     Osteoarthrosis, unspecified whether generalized or localized, unspecified site     Other and unspecified hyperlipidemia     Other malaise and fatigue     Other psoriasis     Sleep apnea     Thoracic or lumbosacral neuritis or radiculitis, unspecified     Unspecified vitamin D deficiency       Past Surgical History:   Procedure Laterality Date    JOINT REPLACEMENT Right Oct.6, 2015    right knee    SHOULDER SURGERY Bilateral rotator cuff    TONSILLECTOMY AND ADENOIDECTOMY      TURP      VASECTOMY       Family History   Problem Relation Age of Onset    Cancer Father      Current Outpatient Prescriptions   Medication Sig Dispense Refill    magnesium (MAGNESIUM-OXIDE) 250 MG TABS tablet Take 250 mg by mouth daily      zinc 50 MG CAPS Take by mouth      coenzyme Q10 100 MG CAPS capsule Take 100 mg by mouth daily      lisinopril (PRINIVIL;ZESTRIL) 5 MG tablet       TURMERIC PO Take by mouth      MULTIPLE VITAMIN PO Take by mouth daily      aspirin 81 MG tablet Take 81 mg by mouth daily.  tamsulosin (FLOMAX) 0.4 MG capsule Take 0.4 mg by mouth daily.  dutasteride (AVODART) 0.5 MG capsule Take 0.5 mg by mouth daily.  Vitamin D (CHOLECALCIFEROL) 1000 UNITS CAPS capsule Take 10,000 Units by mouth daily.  carvedilol (COREG) 12.5 MG tablet Take 1 tablet by mouth 2 times daily (with meals) 60 tablet 1    ticagrelor (BRILINTA) 90 MG TABS tablet Take 1 tablet by mouth 2 times daily 60 tablet 1    atorvastatin (LIPITOR) 80 MG tablet Take 1 tablet by mouth daily 30 tablet 6     No current facility-administered medications for this visit. ALLERGIES:    Allergies   Allergen Reactions    Mobic [Meloxicam] Shortness Of Breath    Nsaids Shortness Of Breath       Social History   Substance Use Topics    Smoking status: Former Smoker     Packs/day: 2.00     Years: 20.00     Quit date: 11/1/1970    Smokeless tobacco: Never Used    Alcohol use No      Body mass index is 31.21 kg/m².   /72   Pulse 64   Ht 5' 10.08\" (1.78 m)   Wt 218 lb (98.9 kg)   BMI 31.21 kg/m²     Lab Results   Component Value Date     05/10/2017    K 4.0 05/10/2017     05/10/2017    CO2 25 05/10/2017    BUN 16 05/10/2017    CREATININE 0.76 10/23/2017    GLUCOSE 97 05/10/2017    GLUCOSE 102 03/01/2012    CALCIUM 9.1 05/10/2017    PROT 6.6 05/10/2017    LABALBU 4.1 05/10/2017    LABALBU 4.3 03/01/2012    BILITOT 0.32 05/10/2017    ALKPHOS 54 05/10/2017    AST 22 05/10/2017    ALT 21 05/10/2017       Lab Results   Component Value Date    WBC 5.0 05/10/2017    RBC 4.87 05/10/2017    RBC 4.97 03/01/2012    HGB 12.8 05/10/2017    HCT 38.7 05/10/2017    MCV 79.3 05/10/2017    MCH 26.3 05/10/2017    MCHC 33.2 05/10/2017    RDW 14.9 05/10/2017     05/10/2017     03/01/2012    MPV 8.2 05/10/2017       Lab Results   Component Value Date    LABA1C 5.4 07/03/2013       Lab Results   Component Value Date    HDL 44 05/10/2017    LDLCHOLESTEROL 158 05/10/2017 Assessment:      1. Essential hypertension, benign     2. Pulmonary asbestosis ([address]   asymtomatic   Rev cxr      3. Coronary artery disease involving native coronary artery of native heart with angina pectoris ([address]     4. NSTEMI (non-ST elevated myocardial infarction) ([address]             Plan:       1. Essential hypertension, benign     2. Pulmonary asbestosis ([address]   asytmomtic  cxr  Rev      3. Coronary artery disease involving native coronary artery of native heart with angina pectoris (HCC)  Lipid Panel    VL DUP CAROTID BILATERAL    US Abdominal Aorta Limited   4. NSTEMI (non-ST elevated myocardial infarction) ([address]  VL DUP CAROTID BILATERAL    US Abdominal Aorta Limited   5. Reversible cerebrovascular vasoconstriction syndrome   VL DUP CAROTID BILATERAL   6. Atherosclerosis of native coronary artery of native heart with angina pectoris ([address]         No Follow-up on file. No orders of the defined types were placed in this encounter. No orders of the defined types were placed in this encounter. No Follow-up on file. Orders Placed This Encounter   Procedures    VL DUP CAROTID BILATERAL     Standing Status:   Future     Standing Expiration Date:   5/7/2018     Order Specific Question:   Reason for exam:     Answer:   ,murmur    US Abdominal Aorta Limited     Order Specific Question:   Reason for exam:     Answer:   screen hx of smoking    Lipid Panel     Standing Status:   Future     Standing Expiration Date:   6/5/2018     Order Specific Question:   Is Patient Fasting?/# of Hours     Answer:   10 to 12     No orders of the defined types were placed in this encounter. Quality & Risk Score Accuracy - MEDICARE ADVANTAGE    Visit Dx: Atherosclerosis of native coronary artery of native heart with angina pectoris ([address]  Stable based upon review of recent symptoms. Continue current treatment plan and follow up at least yearly.   Last edited 03/07/18 13:54 EST by Juan Nascimento MD         Pt

## 2018-03-21 ENCOUNTER — HOSPITAL ENCOUNTER (OUTPATIENT)
Age: 73
Discharge: HOME | End: 2018-03-21
Payer: MEDICARE

## 2018-03-21 ENCOUNTER — HOSPITAL ENCOUNTER (OUTPATIENT)
Dept: VASCULAR LAB | Age: 73
Discharge: HOME | End: 2018-03-21
Payer: MEDICARE

## 2018-03-21 DIAGNOSIS — I67.841 REVERSIBLE CEREBROVASCULAR VASOCONSTRICTION SYNDROME: ICD-10-CM

## 2018-03-21 DIAGNOSIS — I21.4 NSTEMI (NON-ST ELEVATED MYOCARDIAL INFARCTION) (HCC): ICD-10-CM

## 2018-03-21 DIAGNOSIS — I25.119 CORONARY ARTERY DISEASE INVOLVING NATIVE CORONARY ARTERY OF NATIVE HEART WITH ANGINA PECTORIS (HCC): ICD-10-CM

## 2018-03-21 LAB
CHOLESTEROL/HDL RATIO: 2.4
CHOLESTEROL: 116 MG/DL
HDLC SERPL-MCNC: 48 MG/DL
LDL CHOLESTEROL: 50 MG/DL (ref 0–130)
TRIGL SERPL-MCNC: 91 MG/DL
VLDLC SERPL CALC-MCNC: NORMAL MG/DL (ref 1–30)

## 2018-03-21 PROCEDURE — 80061 LIPID PANEL: CPT

## 2018-03-21 PROCEDURE — 93880 EXTRACRANIAL BILAT STUDY: CPT

## 2018-03-21 PROCEDURE — 36415 COLL VENOUS BLD VENIPUNCTURE: CPT

## 2018-03-21 PROCEDURE — 93978 VASCULAR STUDY: CPT

## 2018-04-25 ENCOUNTER — OFFICE VISIT (OUTPATIENT)
Dept: INTERNAL MEDICINE CLINIC | Age: 73
End: 2018-04-25
Payer: MEDICARE

## 2018-04-25 VITALS
HEART RATE: 67 BPM | WEIGHT: 212 LBS | RESPIRATION RATE: 14 BRPM | BODY MASS INDEX: 30.35 KG/M2 | SYSTOLIC BLOOD PRESSURE: 135 MMHG | DIASTOLIC BLOOD PRESSURE: 79 MMHG | HEIGHT: 70 IN | OXYGEN SATURATION: 95 %

## 2018-04-25 DIAGNOSIS — I10 ESSENTIAL HYPERTENSION, BENIGN: ICD-10-CM

## 2018-04-25 DIAGNOSIS — I65.21 ASYMPTOMATIC STENOSIS OF RIGHT CAROTID ARTERY: ICD-10-CM

## 2018-04-25 DIAGNOSIS — R00.2 PALPITATION: Primary | ICD-10-CM

## 2018-04-25 PROCEDURE — 99213 OFFICE O/P EST LOW 20 MIN: CPT | Performed by: INTERNAL MEDICINE

## 2018-04-25 ASSESSMENT — ENCOUNTER SYMPTOMS
COUGH: 0
EYE DISCHARGE: 0
ABDOMINAL DISTENTION: 0
DIARRHEA: 0
EYE PAIN: 0
WHEEZING: 0
SHORTNESS OF BREATH: 0
COLOR CHANGE: 0
TROUBLE SWALLOWING: 0
BLOOD IN STOOL: 0

## 2018-07-30 ENCOUNTER — HOSPITAL ENCOUNTER (OUTPATIENT)
Age: 73
Setting detail: SPECIMEN
Discharge: HOME | End: 2018-07-30
Payer: MEDICARE

## 2018-07-30 LAB — PROSTATE SPECIFIC ANTIGEN: 0.43 UG/L

## 2018-09-18 ENCOUNTER — NURSE ONLY (OUTPATIENT)
Dept: INTERNAL MEDICINE CLINIC | Age: 73
End: 2018-09-18
Payer: MEDICARE

## 2018-09-18 DIAGNOSIS — Z23 NEED FOR VACCINATION: Primary | ICD-10-CM

## 2018-09-18 PROCEDURE — 90662 IIV NO PRSV INCREASED AG IM: CPT | Performed by: INTERNAL MEDICINE

## 2018-09-18 PROCEDURE — G0008 ADMIN INFLUENZA VIRUS VAC: HCPCS | Performed by: INTERNAL MEDICINE

## 2018-09-18 ASSESSMENT — PATIENT HEALTH QUESTIONNAIRE - PHQ9
SUM OF ALL RESPONSES TO PHQ QUESTIONS 1-9: 0
1. LITTLE INTEREST OR PLEASURE IN DOING THINGS: 0
2. FEELING DOWN, DEPRESSED OR HOPELESS: 0
SUM OF ALL RESPONSES TO PHQ9 QUESTIONS 1 & 2: 0
SUM OF ALL RESPONSES TO PHQ QUESTIONS 1-9: 0

## 2018-10-04 ENCOUNTER — OFFICE VISIT (OUTPATIENT)
Dept: INTERNAL MEDICINE CLINIC | Age: 73
End: 2018-10-04
Payer: MEDICARE

## 2018-10-04 VITALS
OXYGEN SATURATION: 97 % | HEART RATE: 64 BPM | DIASTOLIC BLOOD PRESSURE: 74 MMHG | BODY MASS INDEX: 29.2 KG/M2 | WEIGHT: 204 LBS | HEIGHT: 70 IN | SYSTOLIC BLOOD PRESSURE: 122 MMHG

## 2018-10-04 DIAGNOSIS — I10 ESSENTIAL HYPERTENSION, BENIGN: ICD-10-CM

## 2018-10-04 DIAGNOSIS — I25.119 CORONARY ARTERY DISEASE INVOLVING NATIVE CORONARY ARTERY OF NATIVE HEART WITH ANGINA PECTORIS (HCC): Primary | ICD-10-CM

## 2018-10-04 DIAGNOSIS — E55.9 VITAMIN D DEFICIENCY: ICD-10-CM

## 2018-10-04 DIAGNOSIS — E78.2 MIXED DYSLIPIDEMIA: ICD-10-CM

## 2018-10-04 DIAGNOSIS — F41.9 ANXIETY: ICD-10-CM

## 2018-10-04 PROCEDURE — 99214 OFFICE O/P EST MOD 30 MIN: CPT | Performed by: INTERNAL MEDICINE

## 2018-10-04 ASSESSMENT — ENCOUNTER SYMPTOMS
SHORTNESS OF BREATH: 0
EYE PAIN: 0
EYE DISCHARGE: 0
WHEEZING: 0
COLOR CHANGE: 0
DIARRHEA: 0
COUGH: 0
BLOOD IN STOOL: 0
TROUBLE SWALLOWING: 0
ABDOMINAL DISTENTION: 0

## 2018-10-04 NOTE — PROGRESS NOTES
facility-administered medications for this visit. ALLERGIES:    Allergies   Allergen Reactions    Mobic [Meloxicam] Shortness Of Breath    Nsaids Shortness Of Breath       Social History   Substance Use Topics    Smoking status: Former Smoker     Packs/day: 2.00     Years: 20.00     Quit date: 11/1/1970    Smokeless tobacco: Never Used    Alcohol use No      Body mass index is 29.2 kg/m². /74   Pulse 64   Ht 5' 10.08\" (1.78 m)   Wt 204 lb (92.5 kg)   SpO2 97%   BMI 29.20 kg/m²     Lab Results   Component Value Date     05/10/2017    K 4.0 05/10/2017     05/10/2017    CO2 25 05/10/2017    BUN 16 05/10/2017    CREATININE 0.76 10/23/2017    GLUCOSE 97 05/10/2017    GLUCOSE 102 03/01/2012    CALCIUM 9.1 05/10/2017    PROT 6.6 05/10/2017    LABALBU 4.1 05/10/2017    LABALBU 4.3 03/01/2012    BILITOT 0.32 05/10/2017    ALKPHOS 54 05/10/2017    AST 22 05/10/2017    ALT 21 05/10/2017       Lab Results   Component Value Date    WBC 5.0 05/10/2017    RBC 4.87 05/10/2017    RBC 4.97 03/01/2012    HGB 12.8 05/10/2017    HCT 38.7 05/10/2017    MCV 79.3 05/10/2017    MCH 26.3 05/10/2017    MCHC 33.2 05/10/2017    RDW 14.9 05/10/2017     05/10/2017     03/01/2012    MPV 8.2 05/10/2017       Lab Results   Component Value Date    LABA1C 5.4 07/03/2013       Lab Results   Component Value Date    HDL 48 03/21/2018    LDLCHOLESTEROL 50 03/21/2018       Assessment / Plan:       Diagnosis Orders   1. Coronary artery disease involving native coronary artery of native heart with angina pectoris ([address]     2. Mixed dyslipidemia  Lipid Panel   3. Essential hypertension, benign     4. Vitamin D deficiency     5. Anxiety       No Follow-up on file.   Orders Placed This Encounter   Procedures    Lipid Panel     Standing Status:   Future     Standing Expiration Date:   10/4/2019     Order Specific Question:   Is Patient Fasting?/# of Hours     Answer:   y     No orders of the defined types were

## 2018-10-05 ENCOUNTER — HOSPITAL ENCOUNTER (OUTPATIENT)
Age: 73
Setting detail: SPECIMEN
Discharge: HOME | End: 2018-10-05
Payer: MEDICARE

## 2018-10-05 DIAGNOSIS — E78.2 MIXED DYSLIPIDEMIA: ICD-10-CM

## 2018-10-05 LAB
CHOLESTEROL/HDL RATIO: 2.6
CHOLESTEROL: 127 MG/DL
HDLC SERPL-MCNC: 48 MG/DL
LDL CHOLESTEROL: 63 MG/DL (ref 0–130)
TRIGL SERPL-MCNC: 81 MG/DL
VLDLC SERPL CALC-MCNC: NORMAL MG/DL (ref 1–30)

## 2018-12-28 ENCOUNTER — TELEPHONE (OUTPATIENT)
Dept: INTERNAL MEDICINE CLINIC | Age: 73
End: 2018-12-28

## 2018-12-28 RX ORDER — AZITHROMYCIN 250 MG/1
250 TABLET, FILM COATED ORAL SEE ADMIN INSTRUCTIONS
Qty: 6 TABLET | Refills: 0 | Status: SHIPPED | OUTPATIENT
Start: 2018-12-28 | End: 2019-01-02

## 2019-02-20 ENCOUNTER — OFFICE VISIT (OUTPATIENT)
Dept: INTERNAL MEDICINE CLINIC | Age: 74
End: 2019-02-20
Payer: MEDICARE

## 2019-02-20 ENCOUNTER — HOSPITAL ENCOUNTER (OUTPATIENT)
Age: 74
Setting detail: SPECIMEN
Discharge: HOME | End: 2019-02-20
Payer: MEDICARE

## 2019-02-20 VITALS
HEIGHT: 70 IN | BODY MASS INDEX: 30.21 KG/M2 | DIASTOLIC BLOOD PRESSURE: 78 MMHG | SYSTOLIC BLOOD PRESSURE: 110 MMHG | WEIGHT: 211 LBS

## 2019-02-20 DIAGNOSIS — L98.9 SKIN LESION: Primary | ICD-10-CM

## 2019-02-20 DIAGNOSIS — I10 ESSENTIAL HYPERTENSION: ICD-10-CM

## 2019-02-20 LAB
ABSOLUTE EOS #: 0.25 K/UL (ref 0–0.44)
ABSOLUTE IMMATURE GRANULOCYTE: <0.03 K/UL (ref 0–0.3)
ABSOLUTE LYMPH #: 2.04 K/UL (ref 1.1–3.7)
ABSOLUTE MONO #: 0.64 K/UL (ref 0.1–1.2)
ALBUMIN SERPL-MCNC: 4.3 G/DL (ref 3.5–5.2)
ALBUMIN/GLOBULIN RATIO: 2.2 (ref 1–2.5)
ALP BLD-CCNC: 65 U/L (ref 40–129)
ALT SERPL-CCNC: 19 U/L (ref 5–41)
ANION GAP SERPL CALCULATED.3IONS-SCNC: 12 MMOL/L (ref 9–17)
AST SERPL-CCNC: 18 U/L
BASOPHILS # BLD: 1 % (ref 0–2)
BASOPHILS ABSOLUTE: 0.04 K/UL (ref 0–0.2)
BILIRUB SERPL-MCNC: 0.42 MG/DL (ref 0.3–1.2)
BUN BLDV-MCNC: 21 MG/DL (ref 8–23)
BUN/CREAT BLD: ABNORMAL (ref 9–20)
CALCIUM SERPL-MCNC: 9.3 MG/DL (ref 8.6–10.4)
CHLORIDE BLD-SCNC: 99 MMOL/L (ref 98–107)
CO2: 27 MMOL/L (ref 20–31)
CREAT SERPL-MCNC: 0.73 MG/DL (ref 0.7–1.2)
DIFFERENTIAL TYPE: NORMAL
EOSINOPHILS RELATIVE PERCENT: 4 % (ref 1–4)
GFR AFRICAN AMERICAN: >60 ML/MIN
GFR NON-AFRICAN AMERICAN: >60 ML/MIN
GFR SERPL CREATININE-BSD FRML MDRD: ABNORMAL ML/MIN/{1.73_M2}
GFR SERPL CREATININE-BSD FRML MDRD: ABNORMAL ML/MIN/{1.73_M2}
GLUCOSE BLD-MCNC: 83 MG/DL (ref 70–99)
HCT VFR BLD CALC: 42 % (ref 40.7–50.3)
HEMOGLOBIN: 13.4 G/DL (ref 13–17)
IMMATURE GRANULOCYTES: 0 %
LYMPHOCYTES # BLD: 32 % (ref 24–43)
MCH RBC QN AUTO: 27.4 PG (ref 25.2–33.5)
MCHC RBC AUTO-ENTMCNC: 31.9 G/DL (ref 28.4–34.8)
MCV RBC AUTO: 85.9 FL (ref 82.6–102.9)
MONOCYTES # BLD: 10 % (ref 3–12)
NRBC AUTOMATED: 0 PER 100 WBC
PDW BLD-RTO: 13.4 % (ref 11.8–14.4)
PLATELET # BLD: 206 K/UL (ref 138–453)
PLATELET ESTIMATE: NORMAL
PMV BLD AUTO: 10.5 FL (ref 8.1–13.5)
POTASSIUM SERPL-SCNC: 4.2 MMOL/L (ref 3.7–5.3)
RBC # BLD: 4.89 M/UL (ref 4.21–5.77)
RBC # BLD: NORMAL 10*6/UL
SEG NEUTROPHILS: 53 % (ref 36–65)
SEGMENTED NEUTROPHILS ABSOLUTE COUNT: 3.45 K/UL (ref 1.5–8.1)
SODIUM BLD-SCNC: 138 MMOL/L (ref 135–144)
TOTAL PROTEIN: 6.3 G/DL (ref 6.4–8.3)
WBC # BLD: 6.4 K/UL (ref 3.5–11.3)
WBC # BLD: NORMAL 10*3/UL

## 2019-02-20 PROCEDURE — 99213 OFFICE O/P EST LOW 20 MIN: CPT | Performed by: INTERNAL MEDICINE

## 2019-02-20 ASSESSMENT — ENCOUNTER SYMPTOMS
DIARRHEA: 0
EYE DISCHARGE: 0
TROUBLE SWALLOWING: 0
SHORTNESS OF BREATH: 0
ABDOMINAL DISTENTION: 0
EYE PAIN: 0
COLOR CHANGE: 0
WHEEZING: 0
COUGH: 0
BLOOD IN STOOL: 0

## 2019-02-20 ASSESSMENT — PATIENT HEALTH QUESTIONNAIRE - PHQ9
1. LITTLE INTEREST OR PLEASURE IN DOING THINGS: 0
SUM OF ALL RESPONSES TO PHQ QUESTIONS 1-9: 0
SUM OF ALL RESPONSES TO PHQ9 QUESTIONS 1 & 2: 0
2. FEELING DOWN, DEPRESSED OR HOPELESS: 0
SUM OF ALL RESPONSES TO PHQ QUESTIONS 1-9: 0

## 2019-06-20 ENCOUNTER — OFFICE VISIT (OUTPATIENT)
Dept: INTERNAL MEDICINE CLINIC | Age: 74
End: 2019-06-20
Payer: MEDICARE

## 2019-06-20 VITALS
SYSTOLIC BLOOD PRESSURE: 130 MMHG | BODY MASS INDEX: 30.21 KG/M2 | WEIGHT: 211 LBS | DIASTOLIC BLOOD PRESSURE: 78 MMHG | HEIGHT: 70 IN

## 2019-06-20 DIAGNOSIS — M17.11 PRIMARY OSTEOARTHRITIS OF RIGHT KNEE: ICD-10-CM

## 2019-06-20 DIAGNOSIS — I10 ESSENTIAL HYPERTENSION: Primary | ICD-10-CM

## 2019-06-20 DIAGNOSIS — L82.1 SEBORRHEIC KERATOSES: ICD-10-CM

## 2019-06-20 DIAGNOSIS — F41.9 ANXIETY: ICD-10-CM

## 2019-06-20 DIAGNOSIS — E78.2 MIXED DYSLIPIDEMIA: ICD-10-CM

## 2019-06-20 PROCEDURE — 99214 OFFICE O/P EST MOD 30 MIN: CPT | Performed by: INTERNAL MEDICINE

## 2019-06-20 RX ORDER — VITAMIN E 268 MG
400 CAPSULE ORAL DAILY
COMMUNITY

## 2019-06-20 RX ORDER — ASCORBIC ACID 500 MG
1000 TABLET ORAL DAILY
COMMUNITY

## 2019-06-20 ASSESSMENT — ENCOUNTER SYMPTOMS
ABDOMINAL DISTENTION: 0
EYE PAIN: 0
TROUBLE SWALLOWING: 0
COLOR CHANGE: 0
SHORTNESS OF BREATH: 0
WHEEZING: 0
DIARRHEA: 0
COUGH: 0
BLOOD IN STOOL: 0
EYE DISCHARGE: 0

## 2019-06-20 NOTE — PROGRESS NOTES
Subjective:      Visit Information    Have you changed or started any medications since your last visit including any over-the-counter medicines, vitamins, or herbal medicines? no   Have you stopped taking any of your medications? Is so, why? -  no  Are you having any side effects from any of your medications? - no    Have you seen any other physician or provider since your last visit?  no   Have you had any other diagnostic tests since your last visit?  no   Have you been seen in the emergency room and/or had an admission in a hospital since we last saw you?  no   Have you had your routine dental cleaning in the past 6 months?  no     Do you have an active MyChart account? If no, what is the barrier? Yes    Patient Care Team:  Maryuri Fu MD as PCP - General (Internal Medicine)  Maryuri Fu MD as PCP - [facility]    Medical History Review  Past Medical, Family, and Social History reviewed and does not contribute to the patient presenting condition    Health Maintenance   Topic Date Due    DTaP/Tdap/Td vaccine (1 - Tdap) 04/18/1962    Shingles Vaccine (1 of 2) 04/18/1993    Annual Wellness Visit (AWV)  04/12/2016    Potassium monitoring  02/20/2020    Creatinine monitoring  02/20/2020    Flu vaccine  Completed    Pneumococcal 65+ years Vaccine  Completed             Patient ID: Keily Rivera is a 66 y.o. male. HTN  Onset more than 2 years ago  oniel mild to mod  Controlled with current po meds  Not associated with headaches or blurry vision  No chest pain  HLD  Onset more than 5 years ago  Severity is mild, not getting worse  Not associated with pancreatitis  Tolerating statin well no muscle pain        Review of Systems   Constitutional: Negative for appetite change, diaphoresis and fatigue. HENT: Negative for ear discharge and trouble swallowing. Eyes: Negative for pain and discharge. Respiratory: Negative for cough, shortness of breath and wheezing. Cardiovascular: Negative for chest pain and palpitations. Gastrointestinal: Negative for abdominal distention, blood in stool and diarrhea. Endocrine: Negative for polydipsia and polyphagia. Genitourinary: Negative for difficulty urinating and frequency. Musculoskeletal: Negative for gait problem, myalgias and neck pain. Skin: Negative for color change and rash. Allergic/Immunologic: Negative for environmental allergies and food allergies. Neurological: Negative for dizziness and headaches. Hematological: Negative for adenopathy. Does not bruise/bleed easily. Psychiatric/Behavioral: Negative for behavioral problems and sleep disturbance. Objective:   Physical Exam   Constitutional: He is oriented to person, place, and time. He appears well-developed and well-nourished. HENT:   Head: Normocephalic and atraumatic. Eyes: Conjunctivae and EOM are normal. Right eye exhibits no discharge. Left eye exhibits no discharge. Right conjunctiva is not injected. Left conjunctiva is not injected. Right eye exhibits normal extraocular motion. Left eye exhibits normal extraocular motion. Neck: Normal range of motion. Neck supple. No JVD present. No edema and no erythema present. No thyroid mass and no thyromegaly present. Cardiovascular: Normal rate and regular rhythm. Exam reveals no friction rub. No murmur heard. Pulmonary/Chest: Effort normal and breath sounds normal. No accessory muscle usage. No tachypnea and no bradypnea. No respiratory distress. He has no wheezes. He has no rales. Abdominal: Soft. Bowel sounds are normal. He exhibits no distension. There is no tenderness. There is no rebound. Musculoskeletal: Normal range of motion. He exhibits no edema or tenderness. Lymphadenopathy:        Head (right side): No submental and no submandibular adenopathy present. Head (left side): No submental and no submandibular adenopathy present. He has no cervical adenopathy. Neurological: He is alert and oriented to person, place, and time. He displays no atrophy. No cranial nerve deficit or sensory deficit. He exhibits normal muscle tone. Coordination normal.   Skin: Skin is warm. No bruising, no ecchymosis and no rash noted. He is not diaphoretic. No pallor. seb kratos on face  [facility] for now     Psychiatric: He has a normal mood and affect. His behavior is normal. His mood appears not anxious. His affect is not angry. His speech is not slurred. He is not aggressive. Cognition and memory are not impaired. He expresses no homicidal ideation. I have personally reviewed and agree with the patient KENY NEYDA Francisco Rosenthal is a 66 y.o. male who presents today for follow up on his chronic medical conditions as noted below.   Chente Zhong is c/o of   Chief Complaint   Patient presents with    Lesion(s)     4 month f/u on lesion on rt side of face    Hypertension     management       Patient Active Problem List:     Malaise and fatigue     Other psoriasis     Thoracic or lumbosacral neuritis or radiculitis, unspecified     Vitamin D deficiency     Degeneration of lumbar or lumbosacral intervertebral disc     Pulmonary asbestosis (HCC)     Knee osteoarthritis     Lumbosacral radiculopathy     Other spondylosis with radiculopathy, lumbar region     Spinal stenosis of lumbar region at multiple levels     Primary osteoarthritis of right knee     TARA (obstructive sleep apnea)     Acute sinusitis     Coronary artery disease involving native coronary artery of native heart with angina pectoris ([address]     NSTEMI (non-ST elevated myocardial infarction) ([address]     Mixed dyslipidemia     Ischemic cardiomyopathy     Hypertensive heart disease without heart failure     SOB (shortness of breath)     PVC (premature ventricular contraction)     Asymptomatic stenosis of right carotid artery     Anxiety     Essential hypertension     Past Medical History:   Diagnosis Date    Acute gouty arthropathy     Backache, unspecified     Cellulitis and abscess of upper arm and forearm     Degeneration of lumbar or lumbosacral intervertebral disc     Essential hypertension, benign     Obstructive sleep apnea on CPAP     Osteoarthrosis, unspecified whether generalized or localized, unspecified site     Other and unspecified hyperlipidemia     Other malaise and fatigue     Other psoriasis     Sleep apnea     Thoracic or lumbosacral neuritis or radiculitis, unspecified     Unspecified vitamin D deficiency       Past Surgical History:   Procedure Laterality Date    JOINT REPLACEMENT Right Oct.6, 2015    right knee    SHOULDER SURGERY Bilateral rotator cuff    TONSILLECTOMY AND ADENOIDECTOMY      TURP      VASECTOMY       Family History   Problem Relation Age of Onset    Cancer Father      Current Outpatient Medications   Medication Sig Dispense Refill    vitamin C (ASCORBIC ACID) 500 MG tablet Take 500 mg by mouth daily      vitamin E 400 UNIT capsule Take 400 Units by mouth daily      magnesium (MAGNESIUM-OXIDE) 250 MG TABS tablet Take 250 mg by mouth daily      zinc 50 MG CAPS Take by mouth      coenzyme Q10 100 MG CAPS capsule Take 100 mg by mouth daily      lisinopril (PRINIVIL;ZESTRIL) 5 MG tablet       carvedilol (COREG) 12.5 MG tablet Take 1 tablet by mouth 2 times daily (with meals) 60 tablet 1    ticagrelor (BRILINTA) 90 MG TABS tablet Take 1 tablet by mouth 2 times daily 60 tablet 1    atorvastatin (LIPITOR) 80 MG tablet Take 1 tablet by mouth daily 30 tablet 6    TURMERIC PO Take by mouth      MULTIPLE VITAMIN PO Take by mouth daily      aspirin 81 MG tablet Take 81 mg by mouth daily.  tamsulosin (FLOMAX) 0.4 MG capsule Take 0.4 mg by mouth daily.  dutasteride (AVODART) 0.5 MG capsule Take 0.5 mg by mouth daily.  Vitamin D (CHOLECALCIFEROL) 1000 UNITS CAPS capsule Take 10,000 Units by mouth daily.        No current facility-administered medications for this visit. ALLERGIES:    Allergies   Allergen Reactions    Mobic [Meloxicam] Shortness Of Breath    Nsaids Shortness Of Breath       Social History     Tobacco Use    Smoking status: Former Smoker     Packs/day: 2.00     Years: 20.00     Pack years: 40.00     Last attempt to quit: 1970     Years since quittin.6    Smokeless tobacco: Never Used   Substance Use Topics    Alcohol use: No     Alcohol/week: 0.0 oz      Body mass index is 30.21 kg/m². /78   Ht 5' 10.08\" (1.78 m)   Wt 211 lb (95.7 kg)   BMI 30.21 kg/m²     Lab Results   Component Value Date     2019    K 4.2 2019    CL 99 2019    CO2 27 2019    BUN 21 2019    CREATININE 0.73 2019    GLUCOSE 83 2019    GLUCOSE 102 2012    CALCIUM 9.3 2019    PROT 6.3 2019    LABALBU 4.3 2019    LABALBU 4.3 2012    BILITOT 0.42 2019    ALKPHOS 65 2019    AST 18 2019    ALT 19 2019       Lab Results   Component Value Date    WBC 6.4 2019    RBC 4.89 2019    RBC 4.97 2012    HGB 13.4 2019    HCT 42.0 2019    MCV 85.9 2019    MCH 27.4 2019    MCHC 31.9 2019    RDW 13.4 2019     2019     2012    MPV 10.5 2019       Lab Results   Component Value Date    LABA1C 5.4 2013       Lab Results   Component Value Date    HDL 48 10/05/2018    LDLCHOLESTEROL 63 10/05/2018       Assessment / Plan:      Diagnosis Orders   1. Essential hypertension     2. Anxiety     3. Primary osteoarthritis of right knee     4. Mixed dyslipidemia       No follow-ups on file. No orders of the defined types were placed in this encounter. No orders of the defined types were placed in this encounter.      Labs rev  htn is better    seb kratos on face  watch for now

## 2019-07-31 ENCOUNTER — HOSPITAL ENCOUNTER (OUTPATIENT)
Age: 74
Setting detail: SPECIMEN
Discharge: HOME | End: 2019-07-31
Payer: MEDICARE

## 2019-07-31 LAB — PROSTATE SPECIFIC ANTIGEN: 0.38 UG/L

## 2019-08-05 ENCOUNTER — OFFICE VISIT (OUTPATIENT)
Dept: INTERNAL MEDICINE CLINIC | Age: 74
End: 2019-08-05
Payer: MEDICARE

## 2019-08-05 VITALS
HEIGHT: 70 IN | DIASTOLIC BLOOD PRESSURE: 78 MMHG | SYSTOLIC BLOOD PRESSURE: 122 MMHG | OXYGEN SATURATION: 94 % | WEIGHT: 209 LBS | HEART RATE: 68 BPM | BODY MASS INDEX: 29.92 KG/M2

## 2019-08-05 DIAGNOSIS — Z00.00 ROUTINE GENERAL MEDICAL EXAMINATION AT A HEALTH CARE FACILITY: Primary | ICD-10-CM

## 2019-08-05 PROCEDURE — G0438 PPPS, INITIAL VISIT: HCPCS | Performed by: PHYSICIAN ASSISTANT

## 2019-08-05 ASSESSMENT — PATIENT HEALTH QUESTIONNAIRE - PHQ9
SUM OF ALL RESPONSES TO PHQ QUESTIONS 1-9: 0
2. FEELING DOWN, DEPRESSED OR HOPELESS: 0
SUM OF ALL RESPONSES TO PHQ9 QUESTIONS 1 & 2: 0
SUM OF ALL RESPONSES TO PHQ QUESTIONS 1-9: 0
1. LITTLE INTEREST OR PLEASURE IN DOING THINGS: 0

## 2019-08-05 NOTE — PROGRESS NOTES
Medicare Annual Wellness Visit    Name: Hetal Moody Date: 2019   MRN: [id number] Sex: Male   Age: 66 y.o. Ethnicity: Non-/Non    : 1945 Race: Veena Salgado is here for Medicare AWV    Screenings for behavioral, psychosocial and functional/safety risks, and cognitive dysfunction are all negative except as indicated below. These results, as well as other patient data from the [address] form, are documented in Flowsheets linked to this Encounter. Allergies   Allergen Reactions    Mobic [Meloxicam] Shortness Of Breath    Nsaids Shortness Of Breath     Prior to Visit Medications    Medication Sig Taking? Authorizing Provider   vitamin C (ASCORBIC ACID) 500 MG tablet Take 500 mg by mouth daily Yes Historical Provider, MD   vitamin E 400 UNIT capsule Take 400 Units by mouth daily Yes Historical Provider, MD   magnesium (MAGNESIUM-OXIDE) 250 MG TABS tablet Take 250 mg by mouth daily Yes Historical Provider, MD   zinc 50 MG CAPS Take by mouth Yes Historical Provider, MD   coenzyme Q10 100 MG CAPS capsule Take 100 mg by mouth daily Yes Historical Provider, MD   lisinopril (PRINIVIL;ZESTRIL) 5 MG tablet  Yes Historical Provider, MD   carvedilol (COREG) 12.5 MG tablet Take 1 tablet by mouth 2 times daily (with meals) Yes Kurtis Cat MD   ticagrelor (BRILINTA) 90 MG TABS tablet Take 1 tablet by mouth 2 times daily Yes Kurtis Cat MD   atorvastatin (LIPITOR) 80 MG tablet Take 1 tablet by mouth daily Yes Kurtis Cat MD   TURMERIC PO Take by mouth Yes Historical Provider, MD   MULTIPLE VITAMIN PO Take by mouth daily Yes Historical Provider, MD   aspirin 81 MG tablet Take 81 mg by mouth daily. Yes Historical Provider, MD   tamsulosin (FLOMAX) 0.4 MG capsule Take 0.4 mg by mouth daily. Yes Historical Provider, MD   dutasteride (AVODART) 0.5 MG capsule Take 0.5 mg by mouth daily.  Yes Historical Provider, MD   Vitamin D (CHOLECALCIFEROL) 1000

## 2019-08-05 NOTE — PROGRESS NOTES
Visit Information    Have you changed or started any medications since your last visit including any over-the-counter medicines, vitamins, or herbal medicines? no   Are you having any side effects from any of your medications? -  no  Have you stopped taking any of your medications? Is so, why? -  no    Have you seen any other physician or provider since your last visit? No  Have you had any other diagnostic tests since your last visit? No  Have you been seen in the emergency room and/or had an admission to a hospital since we last saw you? No  Have you had your routine dental cleaning in the past 6 months? no    Have you activated your [employer] account? If not, what are your barriers?  Yes     Patient Care Team:  Qamar Peterson MD as PCP - General (Internal Medicine)  Qamar Peterson MD as PCP - [facility]    Medical History Review  Past Medical, Family, and Social History reviewed and does not contribute to the patient presenting condition    Health Maintenance   Topic Date Due    DTaP/Tdap/Td vaccine (1 - Tdap) 04/18/1962    Shingles Vaccine (1 of 2) 04/18/1993    Annual Wellness Visit (AWV)  04/18/2006    Flu vaccine (1) 09/01/2019    Potassium monitoring  02/20/2020    Creatinine monitoring  02/20/2020    Pneumococcal 65+ years Vaccine  Completed     Chief Complaint   Patient presents with   [facility] Medicare AWV

## 2019-09-24 ENCOUNTER — OFFICE VISIT (OUTPATIENT)
Dept: INTERNAL MEDICINE CLINIC | Age: 74
End: 2019-09-24
Payer: MEDICARE

## 2019-09-24 VITALS
WEIGHT: 214 LBS | HEIGHT: 70 IN | SYSTOLIC BLOOD PRESSURE: 128 MMHG | BODY MASS INDEX: 30.64 KG/M2 | DIASTOLIC BLOOD PRESSURE: 78 MMHG

## 2019-09-24 DIAGNOSIS — E78.2 MIXED DYSLIPIDEMIA: ICD-10-CM

## 2019-09-24 DIAGNOSIS — I10 ESSENTIAL HYPERTENSION: ICD-10-CM

## 2019-09-24 DIAGNOSIS — M17.11 PRIMARY OSTEOARTHRITIS OF RIGHT KNEE: Primary | ICD-10-CM

## 2019-09-24 DIAGNOSIS — E55.9 VITAMIN D DEFICIENCY: ICD-10-CM

## 2019-09-24 PROCEDURE — 99213 OFFICE O/P EST LOW 20 MIN: CPT | Performed by: INTERNAL MEDICINE

## 2019-09-24 ASSESSMENT — ENCOUNTER SYMPTOMS
COLOR CHANGE: 0
TROUBLE SWALLOWING: 0
ABDOMINAL DISTENTION: 0
DIARRHEA: 0
EYE PAIN: 0
SHORTNESS OF BREATH: 0
COUGH: 0
WHEEZING: 0
BLOOD IN STOOL: 0
EYE DISCHARGE: 0

## 2019-09-25 ENCOUNTER — HOSPITAL ENCOUNTER (OUTPATIENT)
Age: 74
Setting detail: SPECIMEN
Discharge: HOME | End: 2019-09-25
Payer: MEDICARE

## 2019-09-25 DIAGNOSIS — E78.2 MIXED DYSLIPIDEMIA: ICD-10-CM

## 2019-09-25 DIAGNOSIS — E55.9 VITAMIN D DEFICIENCY: ICD-10-CM

## 2019-09-25 LAB
CHOLESTEROL/HDL RATIO: 3.2
CHOLESTEROL: 139 MG/DL
HDLC SERPL-MCNC: 44 MG/DL
LDL CHOLESTEROL: 61 MG/DL (ref 0–130)
TRIGL SERPL-MCNC: 168 MG/DL
VITAMIN D 25-HYDROXY: 77.7 NG/ML (ref 30–100)
VLDLC SERPL CALC-MCNC: ABNORMAL MG/DL (ref 1–30)

## 2020-02-05 ENCOUNTER — OFFICE VISIT (OUTPATIENT)
Dept: INTERNAL MEDICINE CLINIC | Age: 75
End: 2020-02-05
Payer: MEDICARE

## 2020-02-05 VITALS
BODY MASS INDEX: 30.78 KG/M2 | OXYGEN SATURATION: 96 % | WEIGHT: 215 LBS | SYSTOLIC BLOOD PRESSURE: 146 MMHG | DIASTOLIC BLOOD PRESSURE: 88 MMHG | HEART RATE: 63 BPM | HEIGHT: 70 IN

## 2020-02-05 PROCEDURE — 99214 OFFICE O/P EST MOD 30 MIN: CPT | Performed by: INTERNAL MEDICINE

## 2020-02-05 RX ORDER — FUROSEMIDE 20 MG/1
TABLET ORAL
COMMUNITY
Start: 2020-01-17 | End: 2021-03-10

## 2020-02-05 RX ORDER — LISINOPRIL 10 MG/1
10 TABLET ORAL DAILY
Qty: 90 TABLET | Refills: 3 | Status: SHIPPED | OUTPATIENT
Start: 2020-02-05 | End: 2021-03-10 | Stop reason: ALTCHOICE

## 2020-02-05 ASSESSMENT — ENCOUNTER SYMPTOMS
EYE PAIN: 0
EYE DISCHARGE: 0
BLOOD IN STOOL: 0
COUGH: 0
COLOR CHANGE: 0
WHEEZING: 0
TROUBLE SWALLOWING: 0
ABDOMINAL DISTENTION: 0
DIARRHEA: 0
SHORTNESS OF BREATH: 0

## 2020-02-05 ASSESSMENT — PATIENT HEALTH QUESTIONNAIRE - PHQ9
SUM OF ALL RESPONSES TO PHQ9 QUESTIONS 1 & 2: 2
SUM OF ALL RESPONSES TO PHQ QUESTIONS 1-9: 2
SUM OF ALL RESPONSES TO PHQ QUESTIONS 1-9: 2
2. FEELING DOWN, DEPRESSED OR HOPELESS: 1
1. LITTLE INTEREST OR PLEASURE IN DOING THINGS: 1

## 2020-02-05 NOTE — PROGRESS NOTES
[address] [address]  Dept: [phone]  Dept Fax: [phone]     Candida Hood is a 66 y.o. male who presents today for his medical conditions/complaintsas noted below.   Candida Hood is c/o of   Chief Complaint   Patient presents with    Hypertension    Facial Pain     sinus infection, drainage          HPI:      HTN  Onset more than 2 years ago  oniel mild to mod  Controlled with current po meds  Not associated with headaches or blurry vision  No chest pain  HLD  Onset more than 5 years ago  Severity is mild, not getting worse  Not associated with pancreatitis  Tolerating statin well no muscle pain        Hemoglobin A1C (%)   Date Value   07/03/2013 5.4             ( goal A1Cis < 7)   No results found for: LABMICR  LDL Cholesterol (mg/dL)   Date Value   09/25/2019 61   10/05/2018 63   03/21/2018 50     LDL Calculated (mg/dL)   Date Value   02/11/2016 213 (A)       (goal LDL is <100)   AST (U/L)   Date Value   02/20/2019 18     ALT (U/L)   Date Value   02/20/2019 19     BUN (mg/dL)   Date Value   02/20/2019 21     BP Readings from Last 3 Encounters:   02/05/20 (!) 146/88   09/24/19 128/78   08/05/19 122/78          (goal 120/80)    Past Medical History:   Diagnosis Date    Acute gouty arthropathy     Backache, unspecified     Cellulitis and abscess of upper arm and forearm     Degeneration of lumbar or lumbosacral intervertebral disc     Essential hypertension, benign     Obstructive sleep apnea on CPAP     Osteoarthrosis, unspecified whether generalized or localized, unspecified site     Other and unspecified hyperlipidemia     Other malaise and fatigue     Other psoriasis     Sleep apnea     Thoracic or lumbosacral neuritis or radiculitis, unspecified     Unspecified vitamin D deficiency       Past Surgical History:   Procedure Laterality Date    JOINT REPLACEMENT Right Oct.6, 2015    right knee    SHOULDER SURGERY Bilateral rotator cuff    TONSILLECTOMY AND ADENOIDECTOMY      TURP      VASECTOMY         Family History   Problem Relation Age of Onset    Cancer Father           Social History     Tobacco Use    Smoking status: Former Smoker     Packs/day: 2.00     Years: 20.00     Pack years: 40.00     Last attempt to quit: 1970     Years since quittin.2    Smokeless tobacco: Never Used   Substance Use Topics    Alcohol use: No     Alcohol/week: 0.0 standard drinks         Current Outpatient Medications   Medication Sig Dispense Refill    furosemide (LASIX) 20 MG tablet TAKE 1 TABLET BY MOUTH ONCE DAILY      lisinopril (PRINIVIL;ZESTRIL) 10 MG tablet Take 1 tablet by mouth daily 90 tablet 3    vitamin C (ASCORBIC ACID) 500 MG tablet Take 500 mg by mouth daily      vitamin E 400 UNIT capsule Take 400 Units by mouth daily      magnesium (MAGNESIUM-OXIDE) 250 MG TABS tablet Take 250 mg by mouth daily      zinc 50 MG CAPS Take by mouth      coenzyme Q10 100 MG CAPS capsule Take 100 mg by mouth daily      carvedilol (COREG) 12.5 MG tablet Take 1 tablet by mouth 2 times daily (with meals) 60 tablet 1    atorvastatin (LIPITOR) 80 MG tablet Take 1 tablet by mouth daily 30 tablet 6    TURMERIC PO Take by mouth      MULTIPLE VITAMIN PO Take by mouth daily      aspirin 81 MG tablet Take 81 mg by mouth daily.  tamsulosin (FLOMAX) 0.4 MG capsule Take 0.4 mg by mouth daily.  dutasteride (AVODART) 0.5 MG capsule Take 0.5 mg by mouth daily.  Vitamin D (CHOLECALCIFEROL) 1000 UNITS CAPS capsule Take 10,000 Units by mouth daily. No current facility-administered medications for this visit.       Allergies   Allergen Reactions    Mobic [Meloxicam] Shortness Of Breath    Nsaids Shortness Of Breath       Health Maintenance   Topic Date Due    DTaP/Tdap/Td vaccine (1 - Tdap) 1954    Potassium monitoring  2020    Creatinine monitoring  2020    Annual Wellness Visit (AWV)  2020    Lipid screen  09/25/2020    Flu vaccine  Completed    Shingles Vaccine  Completed    Pneumococcal 65+ years Vaccine  Completed    Hepatitis A vaccine  Aged Out    Hepatitis B vaccine  Aged Out    Hib vaccine  Aged Out    Meningococcal (ACWY) vaccine  Aged Out       Subjective:     Review of Systems   Constitutional: Negative for appetite change, diaphoresis and fatigue. HENT: Negative for ear discharge and trouble swallowing. Eyes: Negative for pain and discharge. Respiratory: Negative for cough, shortness of breath and wheezing. Cardiovascular: Negative for chest pain and palpitations. Gastrointestinal: Negative for abdominal distention, blood in stool and diarrhea. Endocrine: Negative for polydipsia and polyphagia. Genitourinary: Negative for difficulty urinating and frequency. Musculoskeletal: Negative for gait problem, myalgias and neck pain. Skin: Negative for color change and rash. Allergic/Immunologic: Negative for environmental allergies and food allergies. Neurological: Negative for dizziness and headaches. Hematological: Negative for adenopathy. Does not bruise/bleed easily. Psychiatric/Behavioral: Negative for behavioral problems and sleep disturbance. Objective:     Physical Exam  Constitutional:       Appearance: He is well-developed. He is not diaphoretic. HENT:      Head: Normocephalic and atraumatic. Eyes:      General:         Right eye: No discharge. Left eye: No discharge. Extraocular Movements:      Right eye: Normal extraocular motion. Left eye: Normal extraocular motion. Conjunctiva/sclera: Conjunctivae normal.      Right eye: Right conjunctiva is not injected. Left eye: Left conjunctiva is not injected. Neck:      Musculoskeletal: Normal range of motion and neck supple. No edema or erythema. Thyroid: No thyroid mass or thyromegaly. Vascular: No JVD.    Cardiovascular:      Rate and Rhythm: Normal rate and tablet     Sig: Take 1 tablet by mouth daily     Dispense:  90 tablet     Refill:  3    unconorlled htn  Will incraes ace to 10  Labs rev  Wt loss advised    Pt working out     Patient given educational materials - see patient instructions. Discussed use, benefit, and side effects of prescribed medications. All patientquestions answered. Pt voiced understanding. Reviewed health maintenance. Instructedto continue current medications, diet and exercise. Patient agreed with treatmentplan. Follow up as directed.      Electronically signed by Terri Kim MD on 2/5/2020 at 12:30 PM

## 2020-03-05 ENCOUNTER — OFFICE VISIT (OUTPATIENT)
Dept: INTERNAL MEDICINE CLINIC | Age: 75
End: 2020-03-05
Payer: MEDICARE

## 2020-03-05 VITALS
WEIGHT: 213 LBS | HEART RATE: 61 BPM | HEIGHT: 70 IN | BODY MASS INDEX: 30.49 KG/M2 | OXYGEN SATURATION: 98 % | DIASTOLIC BLOOD PRESSURE: 94 MMHG | SYSTOLIC BLOOD PRESSURE: 164 MMHG

## 2020-03-05 PROCEDURE — 99214 OFFICE O/P EST MOD 30 MIN: CPT | Performed by: INTERNAL MEDICINE

## 2020-03-05 RX ORDER — LISINOPRIL AND HYDROCHLOROTHIAZIDE 25; 20 MG/1; MG/1
1 TABLET ORAL DAILY
Qty: 90 TABLET | Refills: 1 | Status: SHIPPED | OUTPATIENT
Start: 2020-03-05 | End: 2020-07-22 | Stop reason: SDUPTHER

## 2020-03-05 ASSESSMENT — PATIENT HEALTH QUESTIONNAIRE - PHQ9
1. LITTLE INTEREST OR PLEASURE IN DOING THINGS: 0
2. FEELING DOWN, DEPRESSED OR HOPELESS: 0
SUM OF ALL RESPONSES TO PHQ QUESTIONS 1-9: 0
SUM OF ALL RESPONSES TO PHQ QUESTIONS 1-9: 0
SUM OF ALL RESPONSES TO PHQ9 QUESTIONS 1 & 2: 0

## 2020-03-05 ASSESSMENT — ENCOUNTER SYMPTOMS
DIARRHEA: 0
EYE PAIN: 0
COUGH: 0
WHEEZING: 0
EYE DISCHARGE: 0
SHORTNESS OF BREATH: 0
COLOR CHANGE: 0
BLOOD IN STOOL: 0
ABDOMINAL DISTENTION: 0
TROUBLE SWALLOWING: 0

## 2020-03-05 NOTE — PROGRESS NOTES
 TONSILLECTOMY AND ADENOIDECTOMY      TURP      VASECTOMY         Family History   Problem Relation Age of Onset    Cancer Father           Social History     Tobacco Use    Smoking status: Former Smoker     Packs/day: 2.00     Years: 20.00     Pack years: 40.00     Last attempt to quit: 1970     Years since quittin.3    Smokeless tobacco: Never Used   Substance Use Topics    Alcohol use: No     Alcohol/week: 0.0 standard drinks         Current Outpatient Medications   Medication Sig Dispense Refill    lisinopril-hydroCHLOROthiazide (PRINZIDE;ZESTORETIC) 20-25 MG per tablet Take 1 tablet by mouth daily 90 tablet 1    furosemide (LASIX) 20 MG tablet TAKE 1 TABLET BY MOUTH ONCE DAILY      lisinopril (PRINIVIL;ZESTRIL) 10 MG tablet Take 1 tablet by mouth daily 90 tablet 3    vitamin C (ASCORBIC ACID) 500 MG tablet Take 500 mg by mouth daily      vitamin E 400 UNIT capsule Take 400 Units by mouth daily      magnesium (MAGNESIUM-OXIDE) 250 MG TABS tablet Take 250 mg by mouth daily      zinc 50 MG CAPS Take by mouth      coenzyme Q10 100 MG CAPS capsule Take 100 mg by mouth daily      carvedilol (COREG) 12.5 MG tablet Take 1 tablet by mouth 2 times daily (with meals) 60 tablet 1    atorvastatin (LIPITOR) 80 MG tablet Take 1 tablet by mouth daily 30 tablet 6    TURMERIC PO Take by mouth      MULTIPLE VITAMIN PO Take by mouth daily      aspirin 81 MG tablet Take 81 mg by mouth daily.  tamsulosin (FLOMAX) 0.4 MG capsule Take 0.4 mg by mouth daily.  dutasteride (AVODART) 0.5 MG capsule Take 0.5 mg by mouth daily.  Vitamin D (CHOLECALCIFEROL) 1000 UNITS CAPS capsule Take 10,000 Units by mouth daily. No current facility-administered medications for this visit.       Allergies   Allergen Reactions    Mobic [Meloxicam] Shortness Of Breath    Nsaids Shortness Of Breath       Health Maintenance   Topic Date Due    DTaP/Tdap/Td vaccine (1 - Tdap) 1954    Potassium thyroid mass or thyromegaly. Vascular: No JVD. Cardiovascular:      Rate and Rhythm: Normal rate and regular rhythm. Heart sounds: No murmur. No friction rub. Pulmonary:      Effort: Pulmonary effort is normal. No tachypnea, bradypnea, accessory muscle usage or respiratory distress. Breath sounds: Normal breath sounds. No wheezing or rales. Abdominal:      General: Bowel sounds are normal. There is no distension. Palpations: Abdomen is soft. Tenderness: There is no abdominal tenderness. There is no rebound. Musculoskeletal: Normal range of motion. General: No tenderness. Lymphadenopathy:      Head:      Right side of head: No submental or submandibular adenopathy. Left side of head: No submental or submandibular adenopathy. Cervical: No cervical adenopathy. Skin:     General: Skin is warm. Coloration: Skin is not pale. Findings: No bruising, ecchymosis or rash. Neurological:      Mental Status: He is alert and oriented to person, place, and time. Cranial Nerves: No cranial nerve deficit. Sensory: No sensory deficit. Motor: No atrophy or abnormal muscle tone. Coordination: Coordination normal.   Psychiatric:         Mood and Affect: Mood is not anxious. Affect is not angry. Speech: Speech is not slurred. Behavior: Behavior normal. Behavior is not aggressive. Thought Content: Thought content does not include homicidal ideation. Cognition and Memory: Memory is not impaired. BP (!) 164/94 (Site: Right Upper Arm)   Pulse 61   Ht 5' 10.08\" (1.78 m)   Wt 213 lb (96.6 kg)   SpO2 98%   BMI 30.49 kg/m²     Assessment:          Diagnosis Orders   1. Mixed dyslipidemia     2. Essential hypertension     3. Anxiety     4. Hypertensive heart disease without heart failure  Comprehensive Metabolic Panel             Plan:      Return in about 1 month (around 4/5/2020).     Orders Placed This Encounter Procedures    Comprehensive Metabolic Panel     Standing Status:   Future     Standing Expiration Date:   3/5/2021        Orders Placed This Encounter   Medications    lisinopril-hydroCHLOROthiazide (PRINZIDE;ZESTORETIC) 20-25 MG per tablet     Sig: Take 1 tablet by mouth daily     Dispense:  90 tablet     Refill:  1    htn uncontrolled  Will chane form lisinprl 10 to   Above  w tloss   Better diet     Patient given educational materials - see patient instructions. Discussed use, benefit, and side effects of prescribed medications. All patientquestions answered. Pt voiced understanding. Reviewed health maintenance. Instructedto continue current medications, diet and exercise. Patient agreed with treatmentplan. Follow up as directed.      Electronically signed by Erica Singleton MD on 3/5/2020 at 11:17 AM

## 2020-07-22 ENCOUNTER — OFFICE VISIT (OUTPATIENT)
Dept: INTERNAL MEDICINE CLINIC | Age: 75
End: 2020-07-22
Payer: MEDICARE

## 2020-07-22 VITALS
HEIGHT: 70 IN | HEART RATE: 72 BPM | BODY MASS INDEX: 30.92 KG/M2 | WEIGHT: 216 LBS | DIASTOLIC BLOOD PRESSURE: 74 MMHG | OXYGEN SATURATION: 96 % | TEMPERATURE: 97.8 F | SYSTOLIC BLOOD PRESSURE: 122 MMHG

## 2020-07-22 PROCEDURE — 99214 OFFICE O/P EST MOD 30 MIN: CPT | Performed by: INTERNAL MEDICINE

## 2020-07-22 RX ORDER — LISINOPRIL AND HYDROCHLOROTHIAZIDE 25; 20 MG/1; MG/1
1 TABLET ORAL DAILY
Qty: 90 TABLET | Refills: 1 | Status: SHIPPED | OUTPATIENT
Start: 2020-07-22 | End: 2020-11-10 | Stop reason: SDUPTHER

## 2020-07-22 ASSESSMENT — ENCOUNTER SYMPTOMS
COUGH: 0
EYE PAIN: 0
EYE DISCHARGE: 0
COLOR CHANGE: 0
BLOOD IN STOOL: 0
DIARRHEA: 0
SHORTNESS OF BREATH: 0
TROUBLE SWALLOWING: 0
WHEEZING: 0
ABDOMINAL DISTENTION: 0

## 2020-07-22 ASSESSMENT — PATIENT HEALTH QUESTIONNAIRE - PHQ9
SUM OF ALL RESPONSES TO PHQ QUESTIONS 1-9: 0
SUM OF ALL RESPONSES TO PHQ QUESTIONS 1-9: 0
1. LITTLE INTEREST OR PLEASURE IN DOING THINGS: 0
2. FEELING DOWN, DEPRESSED OR HOPELESS: 0
SUM OF ALL RESPONSES TO PHQ9 QUESTIONS 1 & 2: 0

## 2020-07-22 NOTE — PROGRESS NOTES
[address]  [place] [address]  Dept: [phone]  Dept Fax: [phone]    Monique Carter is a 66 y.o. male who presents today for his medical conditions/complaintsas noted below.   Monique Carter is c/o of   Chief Complaint   Patient presents with    Hypertension    Chest Pain     [facility]cc gap          HPI:     HTN  Onset more than 2 years ago  oniel mild to mod  Controlled with current po meds  Not associated with headaches or blurry vision  No chest pain  HLD  Onset more than 5 years ago  Severity is mild, not getting worse  Not associated with pancreatitis  Tolerating statin well no muscle pain        Hemoglobin A1C (%)   Date Value   07/03/2013 5.4             ( goal A1Cis < 7)   No results found for: LABMICR  LDL Cholesterol (mg/dL)   Date Value   09/25/2019 61   10/05/2018 63   03/21/2018 50     LDL Calculated (mg/dL)   Date Value   02/11/2016 213 (A)       (goal LDL is <100)   AST (U/L)   Date Value   02/20/2019 18     ALT (U/L)   Date Value   02/20/2019 19     BUN (mg/dL)   Date Value   02/20/2019 21     BP Readings from Last 3 Encounters:   07/22/20 122/74   03/05/20 (!) 164/94   02/05/20 (!) 146/88          (goal 120/80)    Past Medical History:   Diagnosis Date    Acute gouty arthropathy     Backache, unspecified     Cellulitis and abscess of upper arm and forearm     Degeneration of lumbar or lumbosacral intervertebral disc     Essential hypertension, benign     Obstructive sleep apnea on CPAP     Osteoarthrosis, unspecified whether generalized or localized, unspecified site     Other and unspecified hyperlipidemia     Other malaise and fatigue     Other psoriasis     Sleep apnea     Thoracic or lumbosacral neuritis or radiculitis, unspecified     Unspecified vitamin D deficiency       Past Surgical History:   Procedure Laterality Date    JOINT REPLACEMENT Right Oct.6, 2015    right knee    SHOULDER SURGERY Bilateral rotator cuff  TONSILLECTOMY AND ADENOIDECTOMY      TURP      VASECTOMY         Family History   Problem Relation Age of Onset    Cancer Father        Social History     Tobacco Use    Smoking status: Former Smoker     Packs/day: 2.00     Years: 20.00     Pack years: 40.00     Last attempt to quit: 1970     Years since quittin.7    Smokeless tobacco: Never Used   Substance Use Topics    Alcohol use: No     Alcohol/week: 0.0 standard drinks      Current Outpatient Medications   Medication Sig Dispense Refill    lisinopril-hydroCHLOROthiazide (PRINZIDE;ZESTORETIC) 20-25 MG per tablet Take 1 tablet by mouth daily 90 tablet 1    furosemide (LASIX) 20 MG tablet TAKE 1 TABLET BY MOUTH ONCE DAILY      lisinopril (PRINIVIL;ZESTRIL) 10 MG tablet Take 1 tablet by mouth daily 90 tablet 3    vitamin C (ASCORBIC ACID) 500 MG tablet Take 500 mg by mouth daily      vitamin E 400 UNIT capsule Take 400 Units by mouth daily      magnesium (MAGNESIUM-OXIDE) 250 MG TABS tablet Take 250 mg by mouth daily      zinc 50 MG CAPS Take by mouth      coenzyme Q10 100 MG CAPS capsule Take 100 mg by mouth daily      carvedilol (COREG) 12.5 MG tablet Take 1 tablet by mouth 2 times daily (with meals) 60 tablet 1    atorvastatin (LIPITOR) 80 MG tablet Take 1 tablet by mouth daily 30 tablet 6    TURMERIC PO Take by mouth      MULTIPLE VITAMIN PO Take by mouth daily      aspirin 81 MG tablet Take 81 mg by mouth daily.  tamsulosin (FLOMAX) 0.4 MG capsule Take 0.4 mg by mouth daily.  dutasteride (AVODART) 0.5 MG capsule Take 0.5 mg by mouth daily.  Vitamin D (CHOLECALCIFEROL) 1000 UNITS CAPS capsule Take 10,000 Units by mouth daily. No current facility-administered medications for this visit.       Allergies   Allergen Reactions    Mobic [Meloxicam] Shortness Of Breath    Nsaids Shortness Of Breath       Health Maintenance   Topic Date Due    DTaP/Tdap/Td vaccine (1 - Tdap) 1962    Potassium monitoring 02/20/2020    Creatinine monitoring  02/20/2020    Annual Wellness Visit (AWV)  08/05/2020    Flu vaccine (1) 09/01/2020    Lipid screen  09/25/2020    Shingles Vaccine  Completed    Pneumococcal 65+ years Vaccine  Completed    Hepatitis A vaccine  Aged Out    Hepatitis B vaccine  Aged Out    Hib vaccine  Aged Out    Meningococcal (ACWY) vaccine  Aged Out       Subjective:     Review of Systems   Constitutional: Negative for appetite change, diaphoresis and fatigue. HENT: Negative for ear discharge and trouble swallowing. Eyes: Negative for pain and discharge. Respiratory: Negative for cough, shortness of breath and wheezing. Cardiovascular: Negative for chest pain and palpitations. Gastrointestinal: Negative for abdominal distention, blood in stool and diarrhea. Endocrine: Negative for polydipsia and polyphagia. Genitourinary: Negative for difficulty urinating and frequency. Musculoskeletal: Positive for arthralgias. Negative for gait problem, myalgias and neck pain. Skin: Negative for color change and rash. Allergic/Immunologic: Negative for environmental allergies and food allergies. Neurological: Negative for dizziness and headaches. Hematological: Negative for adenopathy. Does not bruise/bleed easily. Psychiatric/Behavioral: Negative for behavioral problems and sleep disturbance. Objective:     Physical Exam  Constitutional:       Appearance: He is well-developed. He is not diaphoretic. HENT:      Head: Normocephalic and atraumatic. Eyes:      General:         Right eye: No discharge. Left eye: No discharge. Extraocular Movements:      Right eye: Normal extraocular motion. Left eye: Normal extraocular motion. Conjunctiva/sclera: Conjunctivae normal.      Right eye: Right conjunctiva is not injected. Left eye: Left conjunctiva is not injected. Neck:      Musculoskeletal: Normal range of motion and neck supple. No edema or erythema. Thyroid: No thyroid mass or thyromegaly. Vascular: No JVD. Cardiovascular:      Rate and Rhythm: Normal rate and regular rhythm. Heart sounds: No murmur. No friction rub. Pulmonary:      Effort: Pulmonary effort is normal. No tachypnea, bradypnea, accessory muscle usage or respiratory distress. Breath sounds: Normal breath sounds. No wheezing or rales. Abdominal:      General: Bowel sounds are normal. There is no distension. Palpations: Abdomen is soft. Tenderness: There is no abdominal tenderness. There is no rebound. Musculoskeletal: Normal range of motion. General: No tenderness. Lymphadenopathy:      Head:      Right side of head: No submental or submandibular adenopathy. Left side of head: No submental or submandibular adenopathy. Cervical: No cervical adenopathy. Skin:     General: Skin is warm. Coloration: Skin is not pale. Findings: No bruising, ecchymosis or rash. Neurological:      Mental Status: He is alert and oriented to person, place, and time. Cranial Nerves: No cranial nerve deficit. Sensory: No sensory deficit. Motor: No atrophy or abnormal muscle tone. Coordination: Coordination normal.   Psychiatric:         Mood and Affect: Mood is not anxious. Affect is not angry. Speech: Speech is not slurred. Behavior: Behavior normal. Behavior is not aggressive. Thought Content: Thought content does not include homicidal ideation. Cognition and Memory: Memory is not impaired. /74   Pulse 72   Temp 97.8 °F (36.6 °C)   Ht 5' 10\" (1.778 m)   Wt 216 lb (98 kg)   SpO2 96%   BMI 30.99 kg/m²     Assessment:       Diagnosis Orders   1. Spinal stenosis of lumbar region at multiple levels  PSA, Total and Free   2. Coronary artery disease involving native coronary artery of native heart with angina pectoris ([facility] [address]     3. Mixed dyslipidemia     4. Lumbosacral radiculopathy     5. Hypertensive heart disease without heart failure     6. Essential hypertension  Lipid Panel   7. Feeling of incomplete bladder emptying   PSA, Total and Free             Plan:      No follow-ups on file. Orders Placed This Encounter   Procedures    PSA, Total and Free    Lipid Panel     Standing Status:   Future     Standing Expiration Date:   10/20/2020     Order Specific Question:   Is Patient Fasting?/# of Hours     Answer:   10 to 12     Orders Placed This Encounter   Medications    lisinopril-hydroCHLOROthiazide (PRINZIDE;ZESTORETIC) 20-25 MG per tablet     Sig: Take 1 tablet by mouth daily     Dispense:  90 tablet     Refill:  1    cad  Stable  No cp  No recent stetn or cath     Patient given educational materials - see patient instructions. Discussed use, benefit, and side effects of prescribed medications. All patientquestions answered. Pt voiced understanding. Reviewed health maintenance. Instructedto continue current medications, diet and exercise. Patient agreed with treatmentplan. Follow up as directed.      Electronically signed by Radha Harrison MD on 7/22/2020 at 3:15 PM

## 2020-07-22 NOTE — PROGRESS NOTES
Visit Information    Have you changed or started any medications since your last visit including any over-the-counter medicines, vitamins, or herbal medicines? no   Are you having any side effects from any of your medications? -  no  Have you stopped taking any of your medications? Is so, why? -  no    Have you seen any other physician or provider since your last visit? No  Have you had any other diagnostic tests since your last visit? Yes - Records Obtained  Have you been seen in the emergency room and/or had an admission to a hospital since we last saw you? No  Have you had your routine dental cleaning in the past 6 months? no    Have you activated your [employer] account? If not, what are your barriers?  Yes     Patient Care Team:  Jesus Sams MD as PCP - General (Internal Medicine)  Jesus Sams MD as PCP - [facility]    Medical History Review  Past Medical, Family, and Social History reviewed and does not contribute to the patient presenting condition    Health Maintenance   Topic Date Due    DTaP/Tdap/Td vaccine (1 - Tdap) 04/18/1962    Potassium monitoring  02/20/2020    Creatinine monitoring  02/20/2020    Annual Wellness Visit (AWV)  08/05/2020    Flu vaccine (1) 09/01/2020    Lipid screen  09/25/2020    Shingles Vaccine  Completed    Pneumococcal 65+ years Vaccine  Completed    Hepatitis A vaccine  Aged Out    Hepatitis B vaccine  Aged Out    Hib vaccine  Aged Out    Meningococcal (ACWY) vaccine  Aged Out

## 2020-07-23 ENCOUNTER — HOSPITAL ENCOUNTER (OUTPATIENT)
Age: 75
Setting detail: SPECIMEN
Discharge: HOME | End: 2020-07-23
Payer: MEDICARE

## 2020-07-23 LAB
CHOLESTEROL/HDL RATIO: 2.8
CHOLESTEROL: 141 MG/DL
HDLC SERPL-MCNC: 50 MG/DL
LDL CHOLESTEROL: 70 MG/DL (ref 0–130)
PROSTATE SPECIFIC ANTIGEN: 0.27 UG/L
TRIGL SERPL-MCNC: 107 MG/DL
VLDLC SERPL CALC-MCNC: NORMAL MG/DL (ref 1–30)

## 2020-11-10 ENCOUNTER — OFFICE VISIT (OUTPATIENT)
Dept: INTERNAL MEDICINE CLINIC | Age: 75
End: 2020-11-10
Payer: MEDICARE

## 2020-11-10 VITALS
BODY MASS INDEX: 31.42 KG/M2 | TEMPERATURE: 98.1 F | SYSTOLIC BLOOD PRESSURE: 140 MMHG | WEIGHT: 219 LBS | OXYGEN SATURATION: 97 % | DIASTOLIC BLOOD PRESSURE: 84 MMHG | HEART RATE: 66 BPM

## 2020-11-10 PROBLEM — I21.4 NSTEMI (NON-ST ELEVATED MYOCARDIAL INFARCTION) (HCC): Status: RESOLVED | Noted: 2017-10-31 | Resolved: 2020-11-10

## 2020-11-10 PROCEDURE — 99214 OFFICE O/P EST MOD 30 MIN: CPT | Performed by: INTERNAL MEDICINE

## 2020-11-10 RX ORDER — ATORVASTATIN CALCIUM 80 MG/1
80 TABLET, FILM COATED ORAL DAILY
Qty: 90 TABLET | Refills: 3 | Status: SHIPPED | OUTPATIENT
Start: 2020-11-10 | End: 2021-11-22

## 2020-11-10 RX ORDER — CARVEDILOL 12.5 MG/1
12.5 TABLET ORAL 2 TIMES DAILY WITH MEALS
Qty: 180 TABLET | Refills: 3 | Status: SHIPPED | OUTPATIENT
Start: 2020-11-10 | End: 2021-12-20

## 2020-11-10 RX ORDER — LISINOPRIL AND HYDROCHLOROTHIAZIDE 25; 20 MG/1; MG/1
1 TABLET ORAL DAILY
Qty: 90 TABLET | Refills: 3 | Status: SHIPPED | OUTPATIENT
Start: 2020-11-10 | End: 2021-11-18 | Stop reason: SDUPTHER

## 2020-11-10 ASSESSMENT — ENCOUNTER SYMPTOMS
ABDOMINAL DISTENTION: 0
COUGH: 0
WHEEZING: 0
EYE DISCHARGE: 0
EYE PAIN: 0
TROUBLE SWALLOWING: 0
SHORTNESS OF BREATH: 0
BLOOD IN STOOL: 0
COLOR CHANGE: 0
DIARRHEA: 0

## 2020-11-10 NOTE — PROGRESS NOTES
[address]  [place] [address]  Dept: [phone]  Dept Fax: [phone]    Chinyere Jung is a 66 y.o. male who presents today for his medical conditions/complaintsas noted below.   Chinyere Jung is c/o of   Chief Complaint   Patient presents with    Medication Refill     wants 90 day supply    Other     here for follow up, no issues    Health Maintenance     due for awv         HPI:     HTN  Onset more than 2 years ago  oniel mild to mod  Controlled with current po meds  Not associated with headaches or blurry vision  No chest pain  HLD  Onset more than 5 years ago  Severity is mild, not getting worse  Not associated with pancreatitis  Tolerating statin well no muscle pain        Hemoglobin A1C (%)   Date Value   07/03/2013 5.4             ( goal A1Cis < 7)   No results found for: LABMICR  LDL Cholesterol (mg/dL)   Date Value   07/23/2020 70   09/25/2019 61   10/05/2018 63     LDL Calculated (mg/dL)   Date Value   02/11/2016 213 (A)       (goal LDL is <100)   AST (U/L)   Date Value   02/20/2019 18     ALT (U/L)   Date Value   02/20/2019 19     BUN (mg/dL)   Date Value   02/20/2019 21     BP Readings from Last 3 Encounters:   11/10/20 (!) 140/84   07/22/20 122/74   03/05/20 (!) 164/94          (goal 120/80)    Past Medical History:   Diagnosis Date    Acute gouty arthropathy     Backache, unspecified     Cellulitis and abscess of upper arm and forearm     Degeneration of lumbar or lumbosacral intervertebral disc     Essential hypertension, benign     Obstructive sleep apnea on CPAP     Osteoarthrosis, unspecified whether generalized or localized, unspecified site     Other and unspecified hyperlipidemia     Other malaise and fatigue     Other psoriasis     Sleep apnea     Thoracic or lumbosacral neuritis or radiculitis, unspecified     Unspecified vitamin D deficiency       Past Surgical History:   Procedure Laterality Date    JOINT REPLACEMENT Right Oct.6, 2015    right knee    SHOULDER SURGERY Bilateral rotator cuff    TONSILLECTOMY AND ADENOIDECTOMY      TURP      VASECTOMY         Family History   Problem Relation Age of Onset    Cancer Father        Social History     Tobacco Use    Smoking status: Former Smoker     Packs/day: 2.00     Years: 20.00     Pack years: 40.00     Last attempt to quit: 1970     Years since quittin.0    Smokeless tobacco: Never Used   Substance Use Topics    Alcohol use: No     Alcohol/week: 0.0 standard drinks      Current Outpatient Medications   Medication Sig Dispense Refill    lisinopril-hydroCHLOROthiazide (PRINZIDE;ZESTORETIC) 20-25 MG per tablet Take 1 tablet by mouth daily 90 tablet 3    atorvastatin (LIPITOR) 80 MG tablet Take 1 tablet by mouth daily 90 tablet 3    carvedilol (COREG) 12.5 MG tablet Take 1 tablet by mouth 2 times daily (with meals) 180 tablet 3    furosemide (LASIX) 20 MG tablet TAKE 1 TABLET BY MOUTH ONCE DAILY      lisinopril (PRINIVIL;ZESTRIL) 10 MG tablet Take 1 tablet by mouth daily 90 tablet 3    vitamin C (ASCORBIC ACID) 500 MG tablet Take 500 mg by mouth daily      vitamin E 400 UNIT capsule Take 400 Units by mouth daily      magnesium (MAGNESIUM-OXIDE) 250 MG TABS tablet Take 250 mg by mouth daily      zinc 50 MG CAPS Take by mouth      coenzyme Q10 100 MG CAPS capsule Take 100 mg by mouth daily      TURMERIC PO Take by mouth      MULTIPLE VITAMIN PO Take by mouth daily      aspirin 81 MG tablet Take 81 mg by mouth daily.  tamsulosin (FLOMAX) 0.4 MG capsule Take 0.4 mg by mouth daily.  dutasteride (AVODART) 0.5 MG capsule Take 0.5 mg by mouth daily.  Vitamin D (CHOLECALCIFEROL) 1000 UNITS CAPS capsule Take 10,000 Units by mouth daily. No current facility-administered medications for this visit.       Allergies   Allergen Reactions    Mobic [Meloxicam] Shortness Of Breath    Nsaids Shortness Of Breath       Health Maintenance Normal range of motion and neck supple. No edema or erythema. Thyroid: No thyroid mass or thyromegaly. Vascular: No JVD. Cardiovascular:      Rate and Rhythm: Normal rate and regular rhythm. Heart sounds: No murmur. No friction rub. Pulmonary:      Effort: Pulmonary effort is normal. No tachypnea, bradypnea, accessory muscle usage or respiratory distress. Breath sounds: Normal breath sounds. No wheezing or rales. Abdominal:      General: Bowel sounds are normal. There is no distension. Palpations: Abdomen is soft. Tenderness: There is no abdominal tenderness. There is no rebound. Musculoskeletal: Normal range of motion. General: No tenderness. Lymphadenopathy:      Head:      Right side of head: No submental or submandibular adenopathy. Left side of head: No submental or submandibular adenopathy. Cervical: No cervical adenopathy. Skin:     General: Skin is warm. Coloration: Skin is not pale. Findings: No bruising, ecchymosis or rash. Neurological:      Mental Status: He is alert and oriented to person, place, and time. Cranial Nerves: No cranial nerve deficit. Sensory: No sensory deficit. Motor: No atrophy or abnormal muscle tone. Coordination: Coordination normal.   Psychiatric:         Mood and Affect: Mood is not anxious. Affect is not angry. Speech: Speech is not slurred. Behavior: Behavior normal. Behavior is not aggressive. Thought Content: Thought content does not include homicidal ideation. Cognition and Memory: Memory is not impaired. BP (!) 140/84   Pulse 66   Temp 98.1 °F (36.7 °C)   Wt 219 lb (99.3 kg)   SpO2 97%   BMI 31.42 kg/m²     Assessment:       Diagnosis Orders   1. Atherosclerosis of native coronary artery with angina pectoris, unspecified whether [facility] or [facility]     2.  Non-STEMI (non-ST elevated myocardial infarction) ([facility] [address]     3. Pulmonary asbestosis ([facility] [address]  CBC Auto Differential    Basic Metabolic Panel   4. Coronary artery disease involving native coronary artery of native heart with angina pectoris ([facility] [address]     5. Essential hypertension     6. Hypertensive heart disease without heart failure     7. Ischemic cardiomyopathy     8. Vitamin D deficiency  Vitamin D 25 Hydroxy             Plan:      No follow-ups on file. Orders Placed This Encounter   Procedures    CBC Auto Differential     Standing Status:   Future     Standing Expiration Date:   2/8/2021    Basic Metabolic Panel     Standing Status:   Future     Standing Expiration Date:   2/8/2021    Vitamin D 25 Hydroxy     Standing Status:   Future     Standing Expiration Date:   11/10/2021     Orders Placed This Encounter   Medications    lisinopril-hydroCHLOROthiazide (PRINZIDE;ZESTORETIC) 20-25 MG per tablet     Sig: Take 1 tablet by mouth daily     Dispense:  90 tablet     Refill:  3    atorvastatin (LIPITOR) 80 MG tablet     Sig: Take 1 tablet by mouth daily     Dispense:  90 tablet     Refill:  3    carvedilol (COREG) 12.5 MG tablet     Sig: Take 1 tablet by mouth 2 times daily (with meals)     Dispense:  180 tablet     Refill:  3        Patient given educational materials - see patient instructions. Discussed use, benefit, and side effects of prescribed medications. All patientquestions answered. Pt voiced understanding. Reviewed health maintenance. Instructedto continue current medications, diet and exercise. Patient agreed with treatmentplan. Follow up as directed.      Electronically signed by Loren Loya MD on 11/10/2020 at 4:10 PM

## 2020-11-10 NOTE — PROGRESS NOTES
Visit Information    Have you changed or started any medications since your last visit including any over-the-counter medicines, vitamins, or herbal medicines? no   Are you having any side effects from any of your medications? -  no  Have you stopped taking any of your medications? Is so, why? -  no    Have you seen any other physician or provider since your last visit? No  Have you had any other diagnostic tests since your last visit? No  Have you been seen in the emergency room and/or had an admission to a hospital since we last saw you? No  Have you had your routine dental cleaning in the past 6 months? no    Have you activated your [employer] account? If not, what are your barriers?  Yes     Patient Care Team:  Ade Galloway MD as PCP - General (Internal Medicine)  Ade Galloway MD as PCP - [facility]    Medical History Review  Past Medical, Family, and Social History reviewed and does not contribute to the patient presenting condition    Health Maintenance   Topic Date Due    DTaP/Tdap/Td vaccine (1 - Tdap) 04/18/1962    Annual Wellness Visit (AWV)  05/29/2019    Potassium monitoring  02/20/2020    Creatinine monitoring  02/20/2020    Lipid screen  07/23/2021    Flu vaccine  Completed    Shingles Vaccine  Completed    Pneumococcal 65+ years Vaccine  Completed    Hepatitis A vaccine  Aged Out    Hepatitis B vaccine  Aged Out    Hib vaccine  Aged Out    Meningococcal (ACWY) vaccine  Aged Out

## 2020-11-19 ENCOUNTER — HOSPITAL ENCOUNTER (OUTPATIENT)
Age: 75
Setting detail: SPECIMEN
Discharge: HOME | End: 2020-11-19
Payer: MEDICARE

## 2020-11-19 LAB
ABSOLUTE EOS #: 0.17 K/UL (ref 0–0.44)
ABSOLUTE IMMATURE GRANULOCYTE: <0.03 K/UL (ref 0–0.3)
ABSOLUTE LYMPH #: 1.38 K/UL (ref 1.1–3.7)
ABSOLUTE MONO #: 0.56 K/UL (ref 0.1–1.2)
ANION GAP SERPL CALCULATED.3IONS-SCNC: 14 MMOL/L (ref 9–17)
BASOPHILS # BLD: 1 % (ref 0–2)
BASOPHILS ABSOLUTE: 0.03 K/UL (ref 0–0.2)
BUN BLDV-MCNC: 16 MG/DL (ref 8–23)
BUN/CREAT BLD: NORMAL (ref 9–20)
CALCIUM SERPL-MCNC: 9.3 MG/DL (ref 8.6–10.4)
CHLORIDE BLD-SCNC: 99 MMOL/L (ref 98–107)
CO2: 23 MMOL/L (ref 20–31)
CREAT SERPL-MCNC: 0.82 MG/DL (ref 0.7–1.2)
DIFFERENTIAL TYPE: ABNORMAL
EOSINOPHILS RELATIVE PERCENT: 3 % (ref 1–4)
GFR AFRICAN AMERICAN: >60 ML/MIN
GFR NON-AFRICAN AMERICAN: >60 ML/MIN
GFR SERPL CREATININE-BSD FRML MDRD: NORMAL ML/MIN/{1.73_M2}
GFR SERPL CREATININE-BSD FRML MDRD: NORMAL ML/MIN/{1.73_M2}
GLUCOSE BLD-MCNC: 90 MG/DL (ref 70–99)
HCT VFR BLD CALC: 41.8 % (ref 40.7–50.3)
HEMOGLOBIN: 13.1 G/DL (ref 13–17)
IMMATURE GRANULOCYTES: 0 %
LYMPHOCYTES # BLD: 22 % (ref 24–43)
MCH RBC QN AUTO: 27.6 PG (ref 25.2–33.5)
MCHC RBC AUTO-ENTMCNC: 31.3 G/DL (ref 28.4–34.8)
MCV RBC AUTO: 88 FL (ref 82.6–102.9)
MONOCYTES # BLD: 9 % (ref 3–12)
NRBC AUTOMATED: 0 PER 100 WBC
PDW BLD-RTO: 13.2 % (ref 11.8–14.4)
PLATELET # BLD: 222 K/UL (ref 138–453)
PLATELET ESTIMATE: ABNORMAL
PMV BLD AUTO: 10.4 FL (ref 8.1–13.5)
POTASSIUM SERPL-SCNC: 4 MMOL/L (ref 3.7–5.3)
RBC # BLD: 4.75 M/UL (ref 4.21–5.77)
RBC # BLD: ABNORMAL 10*6/UL
SEG NEUTROPHILS: 65 % (ref 36–65)
SEGMENTED NEUTROPHILS ABSOLUTE COUNT: 4.11 K/UL (ref 1.5–8.1)
SODIUM BLD-SCNC: 136 MMOL/L (ref 135–144)
VITAMIN D 25-HYDROXY: 71.4 NG/ML (ref 30–100)
WBC # BLD: 6.3 K/UL (ref 3.5–11.3)
WBC # BLD: ABNORMAL 10*3/UL

## 2021-01-13 ENCOUNTER — OFFICE VISIT (OUTPATIENT)
Dept: INTERNAL MEDICINE CLINIC | Age: 76
End: 2021-01-13
Payer: MEDICARE

## 2021-01-13 VITALS
HEIGHT: 70 IN | DIASTOLIC BLOOD PRESSURE: 84 MMHG | OXYGEN SATURATION: 98 % | SYSTOLIC BLOOD PRESSURE: 134 MMHG | HEART RATE: 65 BPM | TEMPERATURE: 97.3 F | BODY MASS INDEX: 30.92 KG/M2 | WEIGHT: 216 LBS

## 2021-01-13 DIAGNOSIS — Z00.00 ROUTINE GENERAL MEDICAL EXAMINATION AT A HEALTH CARE FACILITY: Primary | ICD-10-CM

## 2021-01-13 DIAGNOSIS — J61 PULMONARY ASBESTOSIS (HCC): ICD-10-CM

## 2021-01-13 DIAGNOSIS — I25.119 CORONARY ARTERY DISEASE INVOLVING NATIVE CORONARY ARTERY OF NATIVE HEART WITH ANGINA PECTORIS (HCC): ICD-10-CM

## 2021-01-13 PROCEDURE — G0439 PPPS, SUBSEQ VISIT: HCPCS | Performed by: INTERNAL MEDICINE

## 2021-01-13 ASSESSMENT — PATIENT HEALTH QUESTIONNAIRE - PHQ9
SUM OF ALL RESPONSES TO PHQ QUESTIONS 1-9: 0
1. LITTLE INTEREST OR PLEASURE IN DOING THINGS: 0
SUM OF ALL RESPONSES TO PHQ QUESTIONS 1-9: 0
SUM OF ALL RESPONSES TO PHQ QUESTIONS 1-9: 0

## 2021-01-13 NOTE — PROGRESS NOTES
Medicare Annual Wellness Visit  Name: Claudetta Mince Date: 2021   MRN: [id number] Sex: Male   Age: 66 y.o. Ethnicity: Non-/Non    : 1945 Race: Stella Blizzard is here for Medicare AWV    Screenings for behavioral, psychosocial and functional/safety risks, and cognitive dysfunction are all negative except as indicated below. These results, as well as other patient data from the [address] form, are documented in Flowsheets linked to this Encounter. Allergies   Allergen Reactions    Mobic [Meloxicam] Shortness Of Breath    Nsaids Shortness Of Breath       Prior to Visit Medications    Medication Sig Taking? Authorizing Provider   lisinopril-hydroCHLOROthiazide (PRINZIDE;ZESTORETIC) 20-25 MG per tablet Take 1 tablet by mouth daily Yes Bea Burton MD   atorvastatin (LIPITOR) 80 MG tablet Take 1 tablet by mouth daily Yes Bea Burton MD   carvedilol (COREG) 12.5 MG tablet Take 1 tablet by mouth 2 times daily (with meals) Yes Bea Burton MD   furosemide (LASIX) 20 MG tablet TAKE 1 TABLET BY MOUTH ONCE DAILY Yes Historical Provider, MD   lisinopril (PRINIVIL;ZESTRIL) 10 MG tablet Take 1 tablet by mouth daily Yes Bea Burton MD   vitamin C (ASCORBIC ACID) 500 MG tablet Take 500 mg by mouth daily Yes Historical Provider, MD   vitamin E 400 UNIT capsule Take 400 Units by mouth daily Yes Historical Provider, MD   magnesium (MAGNESIUM-OXIDE) 250 MG TABS tablet Take 250 mg by mouth daily Yes Historical Provider, MD   zinc 50 MG CAPS Take by mouth Yes Historical Provider, MD   coenzyme Q10 100 MG CAPS capsule Take 100 mg by mouth daily Yes Historical Provider, MD   TURMERIC PO Take by mouth Yes Historical Provider, MD   MULTIPLE VITAMIN PO Take by mouth daily Yes Historical Provider, MD   aspirin 81 MG tablet Take 81 mg by mouth daily. Yes Historical Provider, MD   tamsulosin (FLOMAX) 0.4 MG capsule Take 0.4 mg by mouth daily.  Yes Historical Provider, MD   dutasteride (AVODART) 0.5 MG capsule Take 0.5 mg by mouth daily. Yes Historical Provider, MD   Vitamin D (CHOLECALCIFEROL) 1000 UNITS CAPS capsule Take 10,000 Units by mouth daily. Yes Historical Provider, MD       Past Medical History:   Diagnosis Date    Acute gouty arthropathy     Backache, unspecified     Cellulitis and abscess of upper arm and forearm     Degeneration of lumbar or lumbosacral intervertebral disc     Essential hypertension, benign     Obstructive sleep apnea on CPAP     Osteoarthrosis, unspecified whether generalized or localized, unspecified site     Other and unspecified hyperlipidemia     Other malaise and fatigue     Other psoriasis     Sleep apnea     Thoracic or lumbosacral neuritis or radiculitis, unspecified     Unspecified vitamin D deficiency        Past Surgical History:   Procedure Laterality Date    JOINT REPLACEMENT Right Oct.6, 2015    right knee    SHOULDER SURGERY Bilateral rotator cuff    TONSILLECTOMY AND ADENOIDECTOMY      TURP      VASECTOMY         Family History   Problem Relation Age of Onset    Cancer Father        CareTeam (Including outside providers/suppliers regularly involved in providing care):   Patient Care Team:  Isaura Kathleen MD as PCP - General (Internal Medicine)  Isaura Kathleen MD as PCP - [address] [place] [address] Dr Celestin Provider    Wt Readings from Last 3 Encounters:   01/13/21 216 lb (98 kg)   11/10/20 219 lb (99.3 kg)   07/22/20 216 lb (98 kg)     Vitals:    01/13/21 0834   BP: 134/84   Pulse: 65   Temp: 97.3 °F (36.3 °C)   SpO2: 98%   Weight: 216 lb (98 kg)   Height: 5' 10\" (1.778 m)     Body mass index is 30.99 kg/m². Based upon direct observation of the patient, evaluation of cognition reveals recent and remote memory intact.     General Appearance: alert and oriented to person, place and time, well developed and well- nourished, in no acute distress  Skin: warm and dry, no rash or erythema  Head: normocephalic and atraumatic  Eyes: pupils equal, round, and reactive to light, extraocular eye movements intact, conjunctivae normal  ENT: tympanic membrane, external ear and ear canal normal bilaterally, nose without deformity, nasal mucosa and turbinates normal without polyps  Neck: supple and non-tender without mass, no thyromegaly or thyroid nodules, no cervical lymphadenopathy  Pulmonary/Chest: clear to auscultation bilaterally- no wheezes, rales or rhonchi, normal air movement, no respiratory distress  Cardiovascular: normal rate, regular rhythm, normal S1 and S2, no murmurs, rubs, clicks, or gallops, distal pulses intact, no carotid bruits  Abdomen: soft, non-tender, non-distended, normal bowel sounds, no masses or organomegaly  Extremities: no cyanosis, clubbing or edema  Musculoskeletal: normal range of motion, no joint swelling, deformity or tenderness  Neurologic: reflexes normal and symmetric, no cranial nerve deficit, gait, coordination and speech normal    Patient's complete Health Risk Assessment and screening values have been reviewed and are found in Flowsheets. The following problems were reviewed today and where indicated follow up appointments were made and/or referrals ordered. Positive Risk Factor Screenings with Interventions:          General Health and ACP:  General  In general, how would you say your health is?: Good  In the past 7 days, have you experienced any of the following?  New or Increased Pain, New or Increased Fatigue, Loneliness, Social Isolation, Stress or Anger?: None of These  Do you get the social and emotional support that you need?: Yes  Do you have a Living Will?: Yes  Advance Directives     Power of [address] Will ACP-Advance Directive ACP-Power of     Not on File Not on File Not on File Not on File      General Health Risk Interventions:  · none    Health Habits/Nutrition:  Health Habits/Nutrition  Do you exercise for at least 20 minutes 2-3 times per week?: Yes  Have you lost any weight without trying in the past 3 months?: No  Do you eat fewer than 2 meals per day?: No  Have you seen a dentist within the past year?: Yes  Body mass index: (!) 30.99  Health Habits/Nutrition Interventions:  · Inadequate physical activity:  patient agrees to exercise for at least 150 minutes/week     Safety:  Safety  Do you have working smoke detectors?: Yes  Have all throw rugs been removed or fastened?: (!) No  Do you have non-slip mats or surfaces in all bathtubs/showers?: Yes  Do all of your stairways have a railing or banister?: Yes  Are your doorways, halls and stairs free of clutter?: Yes  Do you always fasten your seatbelt when you are in a car?: Yes  Safety Interventions:  · Home safety tips provided     Personalized Preventive Plan   Current Health Maintenance Status  Immunization History   Administered Date(s) Administered    Influenza A (V1G1-59) Vaccine PF IM 12/31/2009    Influenza Vaccine, unspecified formulation 10/07/2015    Influenza, High Dose (Fluzone 65 yrs and older) 10/04/2017, 09/18/2018, 09/25/2019, 09/16/2020    Influenza, High-dose, Quadv, 65 yrs +, IM (Fluzone) 09/16/2020    Influenza, Quadv, IM, PF (6 mo and older Fluzone, Flulaval, Fluarix, and 3 yrs and older Afluria) 10/07/2015    Pneumococcal Conjugate 13-valent (Lduypgp24) 07/28/2015    Pneumococcal Conjugate 7-valent (Prevnar7) 07/26/2006    Pneumococcal Polysaccharide ([id number]) 09/14/2016    Zoster Recombinant (Shingrix) 09/25/2019, 12/16/2019        Health Maintenance   Topic Date Due    Hepatitis C screen  1945    DTaP/Tdap/Td vaccine (1 - Tdap) 04/18/1962    Annual Wellness Visit (AWV)  05/29/2019    Lipid screen  07/23/2021    Potassium monitoring  11/19/2021    Creatinine monitoring  11/19/2021    Flu vaccine  Completed    Shingles Vaccine  Completed    Pneumococcal 65+ years Vaccine  Completed    Hepatitis A vaccine  Aged Out    Hepatitis B vaccine  Aged Out    Hib vaccine  Aged Out    Meningococcal (ACWY) vaccine  Aged Out     Recommendations for Preventive Services Due: see orders and patient instructions/AVS.  . Recommended screening schedule for the next 5-10 years is provided to the patient in written form: see Patient Instructions/AVS.    Mounika Huynh was seen today for medicare awv. Diagnoses and all orders for this visit:    Routine general medical examination at a health care facility             Hep c screen declined         Diagnosis Orders   1. Routine general medical examination at a health care facility     2. Coronary artery disease involving native coronary artery of native heart with angina pectoris (HCC)  Stable no cp  Seen dr Alden Aponte   3.  Pulmonary asbestosis ([facility] [address]  No dyspnea no cp  lamont

## 2021-01-13 NOTE — PROGRESS NOTES
Visit Information    Have you changed or started any medications since your last visit including any over-the-counter medicines, vitamins, or herbal medicines? no   Are you having any side effects from any of your medications? -  no  Have you stopped taking any of your medications? Is so, why? -  no    Have you seen any other physician or provider since your last visit? No  Have you had any other diagnostic tests since your last visit? No  Have you been seen in the emergency room and/or had an admission to a hospital since we last saw you? No  Have you had your routine dental cleaning in the past 6 months? yes -     Have you activated your [employer] account? If not, what are your barriers?  Yes     Patient Care Team:  Danielito Brady MD as PCP - General (Internal Medicine)  Danielito Brady MD as PCP - [facility]    Medical History Review  Past Medical, Family, and Social History reviewed and does not contribute to the patient presenting condition    Health Maintenance   Topic Date Due    Hepatitis C screen  1945    DTaP/Tdap/Td vaccine (1 - Tdap) 04/18/1962    Annual Wellness Visit (AWV)  05/29/2019    Lipid screen  07/23/2021    Potassium monitoring  11/19/2021    Creatinine monitoring  11/19/2021    Flu vaccine  Completed    Shingles Vaccine  Completed    Pneumococcal 65+ years Vaccine  Completed    Hepatitis A vaccine  Aged Out    Hepatitis B vaccine  Aged Out    Hib vaccine  Aged Out    Meningococcal (ACWY) vaccine  Aged Out

## 2021-01-13 NOTE — PATIENT INSTRUCTIONS
Personalized Preventive Plan for Cricket Sutherland - 1/13/2021  Medicare offers a range of preventive health benefits. Some of the tests and screenings are paid in full while other may be subject to a deductible, co-insurance, and/or copay. Some of these benefits include a comprehensive review of your medical history including lifestyle, illnesses that may run in your family, and various assessments and screenings as appropriate. After reviewing your medical record and screening and assessments performed today your provider may have ordered immunizations, labs, imaging, and/or referrals for you. A list of these orders (if applicable) as well as your Preventive Care list are included within your After Visit Summary for your review. Other Preventive Recommendations:    · A preventive eye exam performed by an eye specialist is recommended every 1-2 years to screen for glaucoma; cataracts, macular degeneration, and other eye disorders. · A preventive dental visit is recommended every 6 months. · Try to get at least 150 minutes of exercise per week or 10,000 steps per day on a pedometer . · Order or download the FREE \"Exercise & Physical Activity: Your Everyday Guide\" from [employer]. Call 8-705.181.2434 or search [employer] online. · You need 2195-6272 mg of calcium and 2304-3246 IU of vitamin D per day. It is possible to meet your calcium requirement with diet alone, but a vitamin D supplement is usually necessary to meet this goal.  · When exposed to the sun, use a sunscreen that protects against both UVA and UVB radiation with an SPF of 30 or greater. Reapply every 2 to 3 hours or after sweating, drying off with a towel, or swimming. · Always wear a seat belt when traveling in a car. Always wear a helmet when riding a bicycle or motorcycle.

## 2021-03-10 ENCOUNTER — OFFICE VISIT (OUTPATIENT)
Dept: INTERNAL MEDICINE CLINIC | Age: 76
End: 2021-03-10
Payer: MEDICARE

## 2021-03-10 VITALS
HEIGHT: 70 IN | HEART RATE: 65 BPM | DIASTOLIC BLOOD PRESSURE: 88 MMHG | TEMPERATURE: 97.5 F | BODY MASS INDEX: 31.5 KG/M2 | SYSTOLIC BLOOD PRESSURE: 138 MMHG | OXYGEN SATURATION: 97 % | WEIGHT: 220 LBS

## 2021-03-10 DIAGNOSIS — I10 ESSENTIAL HYPERTENSION: Primary | ICD-10-CM

## 2021-03-10 DIAGNOSIS — J61 PULMONARY ASBESTOSIS (HCC): ICD-10-CM

## 2021-03-10 DIAGNOSIS — Z11.59 NEED FOR HEPATITIS C SCREENING TEST: ICD-10-CM

## 2021-03-10 DIAGNOSIS — I25.119 CORONARY ARTERY DISEASE INVOLVING NATIVE CORONARY ARTERY OF NATIVE HEART WITH ANGINA PECTORIS (HCC): ICD-10-CM

## 2021-03-10 DIAGNOSIS — F41.9 ANXIETY: ICD-10-CM

## 2021-03-10 DIAGNOSIS — E55.9 VITAMIN D DEFICIENCY: ICD-10-CM

## 2021-03-10 PROCEDURE — 99213 OFFICE O/P EST LOW 20 MIN: CPT | Performed by: INTERNAL MEDICINE

## 2021-03-10 ASSESSMENT — ENCOUNTER SYMPTOMS
COUGH: 0
EYE PAIN: 0
DIARRHEA: 0
BLOOD IN STOOL: 0
COLOR CHANGE: 0
WHEEZING: 0
EYE DISCHARGE: 0
ABDOMINAL DISTENTION: 0
SHORTNESS OF BREATH: 0
TROUBLE SWALLOWING: 0

## 2021-03-10 ASSESSMENT — PATIENT HEALTH QUESTIONNAIRE - PHQ9: SUM OF ALL RESPONSES TO PHQ QUESTIONS 1-9: 0

## 2021-03-10 NOTE — PROGRESS NOTES
Visit Information    Have you changed or started any medications since your last visit including any over-the-counter medicines, vitamins, or herbal medicines? no   Are you having any side effects from any of your medications? -  no  Have you stopped taking any of your medications? Is so, why? -  no    Have you seen any other physician or provider since your last visit? Yes - Records Obtained  Have you had any other diagnostic tests since your last visit? Yes - Records Obtained  Have you been seen in the emergency room and/or had an admission to a hospital since we last saw you? No  Have you had your routine dental cleaning in the past 6 months? yes -     Have you activated your [employer] account? If not, what are your barriers?  Yes     Patient Care Team:  Chari Byers MD as PCP - General (Internal Medicine)  Chari Byers MD as PCP - [facility] Provider    Medical History Review  Past Medical, Family, and Social History reviewed and does not contribute to the patient presenting condition    Health Maintenance   Topic Date Due    Hepatitis C screen  Never done    DTaP/Tdap/Td vaccine (1 - Tdap) Never done    Potassium monitoring  11/19/2021    Creatinine monitoring  11/19/2021    Annual Wellness Visit (AWV)  01/14/2022    Flu vaccine  Completed    Shingles Vaccine  Completed    Pneumococcal 65+ years Vaccine  Completed    COVID-19 Vaccine  Completed    Hepatitis A vaccine  Aged Out    Hepatitis B vaccine  Aged Out    Hib vaccine  Aged Out    Meningococcal (ACWY) vaccine  Aged Out

## 2021-03-10 NOTE — PROGRESS NOTES
[address] [facility] [address]  [place] [address]  Dept: [phone]  Dept Fax: [phone]    Qamar Blackmon is a 66 y.o. male who presents today for his medical conditions/complaintsas noted below.   Qamar Blackmon is c/o of   Chief Complaint   Patient presents with    Hypertension    Otalgia     right ear pain,jaw pain on the right side          HPI:     HTN  Onset more than 2 years ago  oniel mild to mod  Controlled with current po meds  Not associated with headaches or blurry vision  No chest pain          Hemoglobin A1C (%)   Date Value   07/03/2013 5.4             ( goal A1Cis < 7)   No results found for: LABMICR  LDL Cholesterol (mg/dL)   Date Value   07/23/2020 70   09/25/2019 61   10/05/2018 63     LDL Calculated (mg/dL)   Date Value   02/11/2016 213 (A)       (goal LDL is <100)   AST (U/L)   Date Value   02/20/2019 18     ALT (U/L)   Date Value   02/20/2019 19     BUN (mg/dL)   Date Value   11/19/2020 16     BP Readings from Last 3 Encounters:   03/10/21 138/88   01/13/21 134/84   11/10/20 (!) 140/84          (goal 120/80)    Past Medical History:   Diagnosis Date    Acute gouty arthropathy     Backache, unspecified     Cellulitis and abscess of upper arm and forearm     Degeneration of lumbar or lumbosacral intervertebral disc     Essential hypertension, benign     Obstructive sleep apnea on CPAP     Osteoarthrosis, unspecified whether generalized or localized, unspecified site     Other and unspecified hyperlipidemia     Other malaise and fatigue     Other psoriasis     Sleep apnea     Thoracic or lumbosacral neuritis or radiculitis, unspecified     Unspecified vitamin D deficiency       Past Surgical History:   Procedure Laterality Date    JOINT REPLACEMENT Right Oct.6, 2015    right knee    SHOULDER SURGERY Bilateral rotator cuff    TONSILLECTOMY AND ADENOIDECTOMY      TURP      VASECTOMY         Family History   Problem Relation Age of Onset    Cancer Father        Social History     Tobacco Use    Smoking status: Former Smoker     Packs/day: 2.00     Years: 20.00     Pack years: 40.00     Quit date: 1970     Years since quittin.3    Smokeless tobacco: Never Used   Substance Use Topics    Alcohol use: No     Alcohol/week: 0.0 standard drinks      Current Outpatient Medications   Medication Sig Dispense Refill    lisinopril-hydroCHLOROthiazide (PRINZIDE;ZESTORETIC) 20-25 MG per tablet Take 1 tablet by mouth daily 90 tablet 3    atorvastatin (LIPITOR) 80 MG tablet Take 1 tablet by mouth daily 90 tablet 3    carvedilol (COREG) 12.5 MG tablet Take 1 tablet by mouth 2 times daily (with meals) 180 tablet 3    vitamin C (ASCORBIC ACID) 500 MG tablet Take 500 mg by mouth daily      vitamin E 400 UNIT capsule Take 400 Units by mouth daily      magnesium (MAGNESIUM-OXIDE) 250 MG TABS tablet Take 250 mg by mouth daily      zinc 50 MG CAPS Take by mouth      TURMERIC PO Take by mouth      MULTIPLE VITAMIN PO Take by mouth daily      aspirin 81 MG tablet Take 81 mg by mouth daily.  tamsulosin (FLOMAX) 0.4 MG capsule Take 0.4 mg by mouth daily.  dutasteride (AVODART) 0.5 MG capsule Take 0.5 mg by mouth daily.  Vitamin D (CHOLECALCIFEROL) 1000 UNITS CAPS capsule Take 10,000 Units by mouth daily. No current facility-administered medications for this visit.       Allergies   Allergen Reactions    Mobic [Meloxicam] Shortness Of Breath    Nsaids Shortness Of Breath       Health Maintenance   Topic Date Due    Hepatitis C screen  Never done    DTaP/Tdap/Td vaccine (1 - Tdap) Never done    Lipid screen  2021    Potassium monitoring  2021    Creatinine monitoring  2021    Annual Wellness Visit (AWV)  2022    Flu vaccine  Completed    Shingles Vaccine  Completed    Pneumococcal 65+ years Vaccine  Completed    COVID-19 Vaccine  Completed    Hepatitis A vaccine  Aged Out    Hepatitis B vaccine  Aged Out    Hib vaccine  Aged Out    Meningococcal (ACWY) vaccine  Aged Out       Subjective:     Review of Systems   Constitutional: Negative for appetite change, diaphoresis and fatigue. HENT: Positive for ear pain. Negative for ear discharge and trouble swallowing. Eyes: Negative for pain and discharge. Respiratory: Negative for cough, shortness of breath and wheezing. Cardiovascular: Negative for chest pain and palpitations. Gastrointestinal: Negative for abdominal distention, blood in stool and diarrhea. Endocrine: Negative for polydipsia and polyphagia. Genitourinary: Negative for difficulty urinating and frequency. Musculoskeletal: Negative for gait problem, myalgias and neck pain. Skin: Negative for color change and rash. Allergic/Immunologic: Negative for environmental allergies and food allergies. Neurological: Negative for dizziness and headaches. Hematological: Negative for adenopathy. Does not bruise/bleed easily. Psychiatric/Behavioral: Negative for behavioral problems and sleep disturbance. Objective:     Physical Exam  Constitutional:       Appearance: He is well-developed. He is not diaphoretic. HENT:      Head: Normocephalic and atraumatic. Eyes:      General:         Right eye: No discharge. Left eye: No discharge. Extraocular Movements:      Right eye: Normal extraocular motion. Left eye: Normal extraocular motion. Conjunctiva/sclera: Conjunctivae normal.      Right eye: Right conjunctiva is not injected. Left eye: Left conjunctiva is not injected. Neck:      Musculoskeletal: Normal range of motion and neck supple. No edema or erythema. Thyroid: No thyroid mass or thyromegaly. Vascular: No JVD. Cardiovascular:      Rate and Rhythm: Normal rate and regular rhythm. Heart sounds: No murmur. No friction rub.    Pulmonary:      Effort: Pulmonary effort is normal. No tachypnea, bradypnea, accessory muscle usage or respiratory distress. Breath sounds: Normal breath sounds. No wheezing or rales. Abdominal:      General: Bowel sounds are normal. There is no distension. Palpations: Abdomen is soft. Tenderness: There is no abdominal tenderness. There is no rebound. Musculoskeletal: Normal range of motion. General: No tenderness. Lymphadenopathy:      Head:      Right side of head: No submental or submandibular adenopathy. Left side of head: No submental or submandibular adenopathy. Cervical: No cervical adenopathy. Skin:     General: Skin is warm. Coloration: Skin is not pale. Findings: No bruising, ecchymosis or rash. Neurological:      Mental Status: He is alert and oriented to person, place, and time. Cranial Nerves: No cranial nerve deficit. Sensory: No sensory deficit. Motor: No atrophy or abnormal muscle tone. Coordination: Coordination normal.   Psychiatric:         Mood and Affect: Mood is not anxious. Affect is not angry. Speech: Speech is not slurred. Behavior: Behavior normal. Behavior is not aggressive. Thought Content: Thought content does not include homicidal ideation. Cognition and Memory: Memory is not impaired. /88   Pulse 65   Temp 97.5 °F (36.4 °C)   Ht 5' 10\" (1.778 m)   Wt 220 lb (99.8 kg)   SpO2 97%   BMI 31.57 kg/m²     Assessment:       Diagnosis Orders   1. Essential hypertension     2. Need for hepatitis C screening test  Hepatitis C Antibody   3. Coronary artery disease involving native coronary artery of native heart with angina pectoris (HCC)  No cp  ldl at target   4. Pulmonary asbestosis ([address]  No dyspen not on oxygen     5. Vitamin D deficiency  Vitamin D 25 Hydroxy   6. Anxiety               Plan:      No follow-ups on file.     Orders Placed This Encounter   Procedures    Hepatitis C Antibody     Standing Status:   Future     Standing Expiration Date:   3/10/2022   [facility] Samples Vitamin D 25 Hydroxy     Standing Status:   Future     Standing Expiration Date:   3/10/2022     No orders of the defined types were placed in this encounter. councelled for wtloss       Patient given educational materials - see patient instructions. Discussed use, benefit, and side effects of prescribed medications. All patientquestions answered. Pt voiced understanding. Reviewed health maintenance. Instructedto continue current medications, diet and exercise. Patient agreed with treatmentplan. Follow up as directed.      Electronically signed by Kalyn Pink MD on 3/10/2021 at 9:37 AM

## 2021-03-11 ENCOUNTER — HOSPITAL ENCOUNTER (OUTPATIENT)
Age: 76
Setting detail: SPECIMEN
Discharge: HOME | End: 2021-03-11
Payer: MEDICARE

## 2021-03-11 DIAGNOSIS — E55.9 VITAMIN D DEFICIENCY: ICD-10-CM

## 2021-03-11 DIAGNOSIS — Z11.59 NEED FOR HEPATITIS C SCREENING TEST: ICD-10-CM

## 2021-03-11 LAB
HEPATITIS C ANTIBODY: NONREACTIVE
VITAMIN D 25-HYDROXY: 85.5 NG/ML (ref 30–100)

## 2021-07-27 ENCOUNTER — OFFICE VISIT (OUTPATIENT)
Dept: INTERNAL MEDICINE CLINIC | Age: 76
End: 2021-07-27
Payer: MEDICARE

## 2021-07-27 VITALS
DIASTOLIC BLOOD PRESSURE: 78 MMHG | BODY MASS INDEX: 31.92 KG/M2 | HEIGHT: 70 IN | OXYGEN SATURATION: 97 % | WEIGHT: 223 LBS | HEART RATE: 65 BPM | SYSTOLIC BLOOD PRESSURE: 122 MMHG

## 2021-07-27 DIAGNOSIS — I25.119 CORONARY ARTERY DISEASE INVOLVING NATIVE CORONARY ARTERY OF NATIVE HEART WITH ANGINA PECTORIS (HCC): ICD-10-CM

## 2021-07-27 DIAGNOSIS — E55.9 VITAMIN D DEFICIENCY: ICD-10-CM

## 2021-07-27 DIAGNOSIS — E78.2 MIXED DYSLIPIDEMIA: ICD-10-CM

## 2021-07-27 DIAGNOSIS — J61 PULMONARY ASBESTOSIS (HCC): ICD-10-CM

## 2021-07-27 DIAGNOSIS — I10 ESSENTIAL HYPERTENSION: Primary | ICD-10-CM

## 2021-07-27 PROCEDURE — 99213 OFFICE O/P EST LOW 20 MIN: CPT | Performed by: INTERNAL MEDICINE

## 2021-07-27 SDOH — ECONOMIC STABILITY: FOOD INSECURITY: WITHIN THE PAST 12 MONTHS, YOU WORRIED THAT YOUR FOOD WOULD RUN OUT BEFORE YOU GOT MONEY TO BUY MORE.: NEVER TRUE

## 2021-07-27 SDOH — ECONOMIC STABILITY: FOOD INSECURITY: WITHIN THE PAST 12 MONTHS, THE FOOD YOU BOUGHT JUST DIDN'T LAST AND YOU DIDN'T HAVE MONEY TO GET MORE.: NEVER TRUE

## 2021-07-27 ASSESSMENT — ENCOUNTER SYMPTOMS
SHORTNESS OF BREATH: 0
DIARRHEA: 0
COLOR CHANGE: 0
EYE DISCHARGE: 0
ABDOMINAL DISTENTION: 0
BLOOD IN STOOL: 0
EYE PAIN: 0
WHEEZING: 0
COUGH: 0
TROUBLE SWALLOWING: 0

## 2021-07-27 ASSESSMENT — SOCIAL DETERMINANTS OF HEALTH (SDOH): HOW HARD IS IT FOR YOU TO PAY FOR THE VERY BASICS LIKE FOOD, HOUSING, MEDICAL CARE, AND HEATING?: NOT HARD AT ALL

## 2021-07-27 NOTE — PROGRESS NOTES
Visit Information    Have you changed or started any medications since your last visit including any over-the-counter medicines, vitamins, or herbal medicines? no   Are you having any side effects from any of your medications? -  no  Have you stopped taking any of your medications? Is so, why? -  no    Have you seen any other physician or provider since your last visit? Yes - Records Obtained  Have you had any other diagnostic tests since your last visit? Yes - Records Obtained  Have you been seen in the emergency room and/or had an admission to a hospital since we last saw you? No  Have you had your routine dental cleaning in the past 6 months? yes -     Have you activated your [employer] account? If not, what are your barriers?  Yes     Patient Care Team:  Rashi Starr MD as PCP - General (Internal Medicine)  Rasih Starr MD as PCP - [facility]    Medical History Review  Past Medical, Family, and Social History reviewed and does contribute to the patient presenting condition    Health Maintenance   Topic Date Due    DTaP/Tdap/Td vaccine (1 - Tdap) Never done    Flu vaccine (1) 09/01/2021    Potassium monitoring  11/19/2021    Creatinine monitoring  11/19/2021    Annual Wellness Visit (AWV)  01/14/2022    Shingles Vaccine  Completed    Pneumococcal 65+ years Vaccine  Completed    COVID-19 Vaccine  Completed    Hepatitis C screen  Completed    Hepatitis A vaccine  Aged Out    Hepatitis B vaccine  Aged Out    Hib vaccine  Aged Out    Meningococcal (ACWY) vaccine  Aged Out

## 2021-07-27 NOTE — PROGRESS NOTES
[address]  [place] [address]  Dept: [phone]  Dept Fax: [phone]    Lon Munguia is a 64 y.o. male who presents today for his medical conditions/complaintsas noted below.   Lon Munguia is c/o of   Chief Complaint   Patient presents with    Hypertension         HPI:     HTN  Onset more than 2 years ago  oniel mild to mod  Controlled with current po meds  Not associated with headaches or blurry vision  No chest pain        Hemoglobin A1C (%)   Date Value   07/03/2013 5.4             ( goal A1Cis < 7)   No results found for: LABMICR  LDL Cholesterol (mg/dL)   Date Value   07/23/2020 70   09/25/2019 61   10/05/2018 63     LDL Calculated (mg/dL)   Date Value   02/11/2016 213 (A)       (goal LDL is <100)   AST (U/L)   Date Value   02/20/2019 18     ALT (U/L)   Date Value   02/20/2019 19     BUN (mg/dL)   Date Value   11/19/2020 16     BP Readings from Last 3 Encounters:   07/27/21 122/78   03/10/21 138/88   01/13/21 134/84          (goal 120/80)    Past Medical History:   Diagnosis Date    Acute gouty arthropathy     Backache, unspecified     Cellulitis and abscess of upper arm and forearm     Degeneration of lumbar or lumbosacral intervertebral disc     Essential hypertension, benign     Obstructive sleep apnea on CPAP     Osteoarthrosis, unspecified whether generalized or localized, unspecified site     Other and unspecified hyperlipidemia     Other malaise and fatigue     Other psoriasis     Sleep apnea     Thoracic or lumbosacral neuritis or radiculitis, unspecified     Unspecified vitamin D deficiency       Past Surgical History:   Procedure Laterality Date    JOINT REPLACEMENT Right Oct.6, 2015    right knee    SHOULDER SURGERY Bilateral rotator cuff    TONSILLECTOMY AND ADENOIDECTOMY      TURP      VASECTOMY         Family History   Problem Relation Age of Onset    Cancer Father        Social History     Tobacco Use    Smoking status: Former Smoker     Packs/day: 2.00     Years: 20.00     Pack years: 40.00     Quit date: 1970     Years since quittin.7    Smokeless tobacco: Never Used   Substance Use Topics    Alcohol use: No     Alcohol/week: 0.0 standard drinks      Current Outpatient Medications   Medication Sig Dispense Refill    lisinopril-hydroCHLOROthiazide (PRINZIDE;ZESTORETIC) 20-25 MG per tablet Take 1 tablet by mouth daily 90 tablet 3    atorvastatin (LIPITOR) 80 MG tablet Take 1 tablet by mouth daily 90 tablet 3    carvedilol (COREG) 12.5 MG tablet Take 1 tablet by mouth 2 times daily (with meals) 180 tablet 3    vitamin C (ASCORBIC ACID) 500 MG tablet Take 500 mg by mouth daily      vitamin E 400 UNIT capsule Take 400 Units by mouth daily      magnesium (MAGNESIUM-OXIDE) 250 MG TABS tablet Take 250 mg by mouth daily      zinc 50 MG CAPS Take by mouth      TURMERIC PO Take by mouth      MULTIPLE VITAMIN PO Take by mouth daily      aspirin 81 MG tablet Take 81 mg by mouth daily.  tamsulosin (FLOMAX) 0.4 MG capsule Take 0.4 mg by mouth daily.  dutasteride (AVODART) 0.5 MG capsule Take 0.5 mg by mouth daily.  Vitamin D (CHOLECALCIFEROL) 1000 UNITS CAPS capsule Take 10,000 Units by mouth daily. No current facility-administered medications for this visit.      Allergies   Allergen Reactions    Mobic [Meloxicam] Shortness Of Breath    Nsaids Shortness Of Breath       Health Maintenance   Topic Date Due    DTaP/Tdap/Td vaccine (1 - Tdap) Never done    Lipid screen  2021    Flu vaccine (1) 2021    Potassium monitoring  2021    Creatinine monitoring  2021    Annual Wellness Visit (AWV)  2022    Shingles Vaccine  Completed    Pneumococcal 65+ years Vaccine  Completed    COVID-19 Vaccine  Completed    Hepatitis C screen  Completed    Hepatitis A vaccine  Aged Out    Hepatitis B vaccine  Aged Out    Hib vaccine  Aged Out    Meningococcal (ACWY) vaccine  Aged Out       Subjective:     Review of Systems   Constitutional: Negative for appetite change, diaphoresis and fatigue. HENT: Negative for ear discharge and trouble swallowing. Eyes: Negative for pain and discharge. Respiratory: Negative for cough, shortness of breath and wheezing. Cardiovascular: Negative for chest pain and palpitations. Gastrointestinal: Negative for abdominal distention, blood in stool and diarrhea. Endocrine: Negative for polydipsia and polyphagia. Genitourinary: Negative for difficulty urinating and frequency. Musculoskeletal: Positive for arthralgias. Negative for gait problem, myalgias and neck pain. Skin: Negative for color change and rash. Allergic/Immunologic: Negative for environmental allergies and food allergies. Neurological: Negative for dizziness and headaches. Hematological: Negative for adenopathy. Does not bruise/bleed easily. Psychiatric/Behavioral: Negative for behavioral problems and sleep disturbance. Objective:     Physical Exam  Constitutional:       Appearance: He is well-developed. He is not diaphoretic. HENT:      Head: Normocephalic and atraumatic. Eyes:      General:         Right eye: No discharge. Left eye: No discharge. Extraocular Movements:      Right eye: Normal extraocular motion. Left eye: Normal extraocular motion. Conjunctiva/sclera: Conjunctivae normal.      Right eye: Right conjunctiva is not injected. Left eye: Left conjunctiva is not injected. Neck:      Thyroid: No thyroid mass or thyromegaly. Vascular: No JVD. Cardiovascular:      Rate and Rhythm: Normal rate and regular rhythm. Heart sounds: No murmur heard. No friction rub. Pulmonary:      Effort: Pulmonary effort is normal. No tachypnea, bradypnea, accessory muscle usage or respiratory distress. Breath sounds: Normal breath sounds. No wheezing or rales.    Abdominal:      General: Bowel sounds are normal. There is no distension. Palpations: Abdomen is soft. Tenderness: There is no abdominal tenderness. There is no rebound. Musculoskeletal:         General: No tenderness. Normal range of motion. Cervical back: Normal range of motion and neck supple. No edema or erythema. Lymphadenopathy:      Head:      Right side of head: No submental or submandibular adenopathy. Left side of head: No submental or submandibular adenopathy. Cervical: No cervical adenopathy. Skin:     General: Skin is warm. Coloration: Skin is not pale. Findings: No bruising, ecchymosis or rash. Neurological:      Mental Status: He is alert and oriented to person, place, and time. Cranial Nerves: No cranial nerve deficit. Sensory: No sensory deficit. Motor: No atrophy or abnormal muscle tone. Coordination: Coordination normal.   Psychiatric:         Mood and Affect: Mood is not anxious. Affect is not angry. Speech: Speech is not slurred. Behavior: Behavior normal. Behavior is not aggressive. Thought Content: Thought content does not include homicidal ideation. Cognition and Memory: Memory is not impaired. /78   Pulse 65   Ht 5' 10\" (1.778 m)   Wt 223 lb (101.2 kg)   SpO2 97%   BMI 32.00 kg/m²     Assessment:       Diagnosis Orders   1. Essential hypertension     2. Coronary artery disease involving native coronary artery of native heart with angina pectoris ([facility] [address]     3. Mixed dyslipidemia     4. Pulmonary asbestosis ([facility] [address]     5. Vitamin D deficiency               Plan:      No follow-ups on file. No orders of the defined types were placed in this encounter. No orders of the defined types were placed in this encounter. las rev  Hep c negative  Vit d chekced normal  Wt loss advised     Patient given educational materials - see patient instructions. Discussed use, benefit, and side effects of prescribed medications.   All patientquestions answered. Pt voiced understanding. Reviewed health maintenance. Instructedto continue current medications, diet and exercise. Patient agreed with treatmentplan. Follow up as directed.      Electronically signed by Mando Rodriguez MD on 7/27/2021 at 10:25 AM

## 2021-08-05 ENCOUNTER — HOSPITAL ENCOUNTER (OUTPATIENT)
Age: 76
Setting detail: SPECIMEN
Discharge: HOME | End: 2021-08-05
Payer: MEDICARE

## 2021-08-05 LAB — PROSTATE SPECIFIC ANTIGEN: 0.32 UG/L

## 2021-11-09 ENCOUNTER — TELEPHONE (OUTPATIENT)
Dept: INTERNAL MEDICINE CLINIC | Age: 76
End: 2021-11-09

## 2021-11-09 NOTE — TELEPHONE ENCOUNTER
Records request received from Dept of [facility]. Request emailed to HIM for completion.

## 2021-11-18 ENCOUNTER — OFFICE VISIT (OUTPATIENT)
Dept: INTERNAL MEDICINE CLINIC | Age: 76
End: 2021-11-18
Payer: MEDICARE

## 2021-11-18 ENCOUNTER — HOSPITAL ENCOUNTER (OUTPATIENT)
Age: 76
Setting detail: SPECIMEN
Discharge: HOME | End: 2021-11-18

## 2021-11-18 VITALS
WEIGHT: 229 LBS | HEART RATE: 72 BPM | DIASTOLIC BLOOD PRESSURE: 88 MMHG | OXYGEN SATURATION: 97 % | BODY MASS INDEX: 32.78 KG/M2 | SYSTOLIC BLOOD PRESSURE: 138 MMHG | HEIGHT: 70 IN

## 2021-11-18 DIAGNOSIS — M48.061 SPINAL STENOSIS OF LUMBAR REGION AT MULTIPLE LEVELS: ICD-10-CM

## 2021-11-18 DIAGNOSIS — I10 ESSENTIAL HYPERTENSION: ICD-10-CM

## 2021-11-18 DIAGNOSIS — I11.9 HYPERTENSIVE HEART DISEASE WITHOUT HEART FAILURE: ICD-10-CM

## 2021-11-18 DIAGNOSIS — E78.2 MIXED DYSLIPIDEMIA: ICD-10-CM

## 2021-11-18 DIAGNOSIS — J61 PULMONARY ASBESTOSIS (HCC): ICD-10-CM

## 2021-11-18 DIAGNOSIS — I25.119 CORONARY ARTERY DISEASE INVOLVING NATIVE CORONARY ARTERY OF NATIVE HEART WITH ANGINA PECTORIS (HCC): Primary | ICD-10-CM

## 2021-11-18 LAB
ABSOLUTE EOS #: 0.16 K/UL (ref 0–0.44)
ABSOLUTE IMMATURE GRANULOCYTE: <0.03 K/UL (ref 0–0.3)
ABSOLUTE LYMPH #: 1.69 K/UL (ref 1.1–3.7)
ABSOLUTE MONO #: 0.52 K/UL (ref 0.1–1.2)
ALBUMIN SERPL-MCNC: 4.3 G/DL (ref 3.5–5.2)
ALBUMIN/GLOBULIN RATIO: 1.8 (ref 1–2.5)
ALP BLD-CCNC: 70 U/L (ref 40–129)
ALT SERPL-CCNC: 22 U/L (ref 5–41)
ANION GAP SERPL CALCULATED.3IONS-SCNC: 13 MMOL/L (ref 9–17)
AST SERPL-CCNC: 20 U/L
BASOPHILS # BLD: 1 % (ref 0–2)
BASOPHILS ABSOLUTE: 0.03 K/UL (ref 0–0.2)
BILIRUB SERPL-MCNC: 0.47 MG/DL (ref 0.3–1.2)
BUN BLDV-MCNC: 17 MG/DL (ref 8–23)
BUN/CREAT BLD: ABNORMAL (ref 9–20)
CALCIUM SERPL-MCNC: 9.2 MG/DL (ref 8.6–10.4)
CHLORIDE BLD-SCNC: 99 MMOL/L (ref 98–107)
CHOLESTEROL, FASTING: 137 MG/DL
CHOLESTEROL/HDL RATIO: 2.9
CO2: 24 MMOL/L (ref 20–31)
CREAT SERPL-MCNC: 0.66 MG/DL (ref 0.7–1.2)
DIFFERENTIAL TYPE: NORMAL
EOSINOPHILS RELATIVE PERCENT: 3 % (ref 1–4)
GFR AFRICAN AMERICAN: >60 ML/MIN
GFR NON-AFRICAN AMERICAN: >60 ML/MIN
GFR SERPL CREATININE-BSD FRML MDRD: ABNORMAL ML/MIN/{1.73_M2}
GFR SERPL CREATININE-BSD FRML MDRD: ABNORMAL ML/MIN/{1.73_M2}
GLUCOSE BLD-MCNC: 106 MG/DL (ref 70–99)
HCT VFR BLD CALC: 43.9 % (ref 40.7–50.3)
HDLC SERPL-MCNC: 48 MG/DL
HEMOGLOBIN: 13.7 G/DL (ref 13–17)
IMMATURE GRANULOCYTES: 0 %
LDL CHOLESTEROL: 71 MG/DL (ref 0–130)
LYMPHOCYTES # BLD: 29 % (ref 24–43)
MCH RBC QN AUTO: 26.5 PG (ref 25.2–33.5)
MCHC RBC AUTO-ENTMCNC: 31.2 G/DL (ref 28.4–34.8)
MCV RBC AUTO: 84.9 FL (ref 82.6–102.9)
MONOCYTES # BLD: 9 % (ref 3–12)
NRBC AUTOMATED: 0 PER 100 WBC
PDW BLD-RTO: 13.2 % (ref 11.8–14.4)
PLATELET # BLD: 213 K/UL (ref 138–453)
PLATELET ESTIMATE: NORMAL
PMV BLD AUTO: 9.6 FL (ref 8.1–13.5)
POTASSIUM SERPL-SCNC: 3.8 MMOL/L (ref 3.7–5.3)
RBC # BLD: 5.17 M/UL (ref 4.21–5.77)
RBC # BLD: NORMAL 10*6/UL
SEG NEUTROPHILS: 58 % (ref 36–65)
SEGMENTED NEUTROPHILS ABSOLUTE COUNT: 3.35 K/UL (ref 1.5–8.1)
SODIUM BLD-SCNC: 136 MMOL/L (ref 135–144)
TOTAL PROTEIN: 6.7 G/DL (ref 6.4–8.3)
TRIGLYCERIDE, FASTING: 92 MG/DL
VLDLC SERPL CALC-MCNC: NORMAL MG/DL (ref 1–30)
WBC # BLD: 5.8 K/UL (ref 3.5–11.3)
WBC # BLD: NORMAL 10*3/UL

## 2021-11-18 PROCEDURE — 99214 OFFICE O/P EST MOD 30 MIN: CPT | Performed by: INTERNAL MEDICINE

## 2021-11-18 RX ORDER — LISINOPRIL AND HYDROCHLOROTHIAZIDE 25; 20 MG/1; MG/1
1 TABLET ORAL 2 TIMES DAILY
Qty: 180 TABLET | Refills: 3 | Status: SHIPPED | OUTPATIENT
Start: 2021-11-18 | End: 2022-10-25 | Stop reason: SDUPTHER

## 2021-11-18 ASSESSMENT — ENCOUNTER SYMPTOMS
BACK PAIN: 1
COLOR CHANGE: 0
BLOOD IN STOOL: 0
ABDOMINAL DISTENTION: 0
EYE PAIN: 0
EYE DISCHARGE: 0
TROUBLE SWALLOWING: 0
DIARRHEA: 0
COUGH: 0
SHORTNESS OF BREATH: 0
WHEEZING: 0

## 2021-11-18 NOTE — PROGRESS NOTES
[address]  [place] [address]  Dept: [phone]  Dept Fax: [phone]    Marizol Anaya is a 64 y.o. male who presents today for his medical conditions/complaintsas noted below.   Marizol Anaya is c/o of   Chief Complaint   Patient presents with    Hypertension         HPI:     HTN  Onset more than 2 years ago  oniel mild to mod  Controlled with current po meds  Not associated with headaches or blurry vision  No chest pain  HLD  Onset more than 5 years ago  Severity is mild, not getting worse  Not associated with pancreatitis  Tolerating statin well no muscle pain        Hemoglobin A1C (%)   Date Value   07/03/2013 5.4             ( goal A1Cis < 7)   No results found for: LABMICR  LDL Cholesterol (mg/dL)   Date Value   07/23/2020 70   09/25/2019 61   10/05/2018 63     LDL Calculated (mg/dL)   Date Value   02/11/2016 213 (A)       (goal LDL is <100)   AST (U/L)   Date Value   02/20/2019 18     ALT (U/L)   Date Value   02/20/2019 19     BUN (mg/dL)   Date Value   11/19/2020 16     BP Readings from Last 3 Encounters:   11/18/21 138/88   07/27/21 122/78   03/10/21 138/88          (goal 120/80)    Past Medical History:   Diagnosis Date    Acute gouty arthropathy     Backache, unspecified     Cellulitis and abscess of upper arm and forearm     Degeneration of lumbar or lumbosacral intervertebral disc     Essential hypertension, benign     Obstructive sleep apnea on CPAP     Osteoarthrosis, unspecified whether generalized or localized, unspecified site     Other and unspecified hyperlipidemia     Other malaise and fatigue     Other psoriasis     Sleep apnea     Thoracic or lumbosacral neuritis or radiculitis, unspecified     Unspecified vitamin D deficiency       Past Surgical History:   Procedure Laterality Date    JOINT REPLACEMENT Right Oct.6, 2015    right knee    SHOULDER SURGERY Bilateral rotator cuff    TONSILLECTOMY AND ADENOIDECTOMY  TURP      VASECTOMY         Family History   Problem Relation Age of Onset    Cancer Father        Social History     Tobacco Use    Smoking status: Former Smoker     Packs/day: 2.00     Years: 20.00     Pack years: 40.00     Quit date: 1970     Years since quittin.0    Smokeless tobacco: Never Used   Substance Use Topics    Alcohol use: No     Alcohol/week: 0.0 standard drinks      Current Outpatient Medications   Medication Sig Dispense Refill    lisinopril-hydroCHLOROthiazide (PRINZIDE;ZESTORETIC) 20-25 MG per tablet Take 1 tablet by mouth daily 90 tablet 3    atorvastatin (LIPITOR) 80 MG tablet Take 1 tablet by mouth daily 90 tablet 3    carvedilol (COREG) 12.5 MG tablet Take 1 tablet by mouth 2 times daily (with meals) 180 tablet 3    vitamin C (ASCORBIC ACID) 500 MG tablet Take 500 mg by mouth daily      vitamin E 400 UNIT capsule Take 400 Units by mouth daily      magnesium (MAGNESIUM-OXIDE) 250 MG TABS tablet Take 250 mg by mouth daily      zinc 50 MG CAPS Take by mouth      TURMERIC PO Take by mouth      MULTIPLE VITAMIN PO Take by mouth daily      aspirin 81 MG tablet Take 81 mg by mouth daily.  tamsulosin (FLOMAX) 0.4 MG capsule Take 0.4 mg by mouth daily.  dutasteride (AVODART) 0.5 MG capsule Take 0.5 mg by mouth daily.  Vitamin D (CHOLECALCIFEROL) 1000 UNITS CAPS capsule Take 10,000 Units by mouth daily. No current facility-administered medications for this visit.      Allergies   Allergen Reactions    Mobic [Meloxicam] Shortness Of Breath    Nsaids Shortness Of Breath       Health Maintenance   Topic Date Due    DTaP/Tdap/Td vaccine (1 - Tdap) Never done    Lipid screen  2021    Potassium monitoring  2021    Creatinine monitoring  2021    Annual Wellness Visit (AWV)  2022    Flu vaccine  Completed    Shingles Vaccine  Completed    Pneumococcal 65+ years Vaccine  Completed    COVID-19 Vaccine  Completed    Hepatitis C screen  Completed    Hepatitis A vaccine  Aged Out    Hepatitis B vaccine  Aged Out    Hib vaccine  Aged Out    Meningococcal (ACWY) vaccine  Aged Out       Subjective:     Review of Systems   Constitutional: Negative for appetite change, diaphoresis and fatigue. HENT: Negative for ear discharge and trouble swallowing. Eyes: Negative for pain and discharge. Respiratory: Negative for cough, shortness of breath and wheezing. Cardiovascular: Negative for chest pain and palpitations. Gastrointestinal: Negative for abdominal distention, blood in stool and diarrhea. Endocrine: Negative for polydipsia and polyphagia. Genitourinary: Negative for difficulty urinating and frequency. Musculoskeletal: Positive for arthralgias and back pain. Negative for gait problem, myalgias and neck pain. Skin: Negative for color change and rash. Allergic/Immunologic: Negative for environmental allergies and food allergies. Neurological: Negative for dizziness and headaches. Hematological: Negative for adenopathy. Does not bruise/bleed easily. Psychiatric/Behavioral: Negative for behavioral problems and sleep disturbance. Objective:     Physical Exam  Constitutional:       Appearance: He is well-developed. He is not diaphoretic. HENT:      Head: Normocephalic and atraumatic. Eyes:      General:         Right eye: No discharge. Left eye: No discharge. Extraocular Movements:      Right eye: Normal extraocular motion. Left eye: Normal extraocular motion. Conjunctiva/sclera: Conjunctivae normal.      Right eye: Right conjunctiva is not injected. Left eye: Left conjunctiva is not injected. Neck:      Thyroid: No thyroid mass or thyromegaly. Vascular: No JVD. Cardiovascular:      Rate and Rhythm: Normal rate and regular rhythm. Heart sounds: No murmur heard. No friction rub.    Pulmonary:      Effort: Pulmonary effort is normal. No tachypnea, bradypnea, accessory muscle usage or respiratory distress. Breath sounds: Normal breath sounds. No wheezing or rales. Abdominal:      General: Bowel sounds are normal. There is no distension. Palpations: Abdomen is soft. Tenderness: There is no abdominal tenderness. There is no rebound. Musculoskeletal:         General: No tenderness. Normal range of motion. Cervical back: Normal range of motion and neck supple. No edema or erythema. Lymphadenopathy:      Head:      Right side of head: No submental or submandibular adenopathy. Left side of head: No submental or submandibular adenopathy. Cervical: No cervical adenopathy. Skin:     General: Skin is warm. Coloration: Skin is not pale. Findings: No bruising, ecchymosis or rash. Neurological:      Mental Status: He is alert and oriented to person, place, and time. Cranial Nerves: No cranial nerve deficit. Sensory: No sensory deficit. Motor: No atrophy or abnormal muscle tone. Coordination: Coordination normal.   Psychiatric:         Mood and Affect: Mood is not anxious. Affect is not angry. Speech: Speech is not slurred. Behavior: Behavior normal. Behavior is not aggressive. Thought Content: Thought content does not include homicidal ideation. Cognition and Memory: Memory is not impaired. /88   Pulse 72   Ht 5' 10\" (1.778 m)   Wt 229 lb (103.9 kg)   SpO2 97%   BMI 32.86 kg/m²     Assessment:       Diagnosis Orders   1. Coronary artery disease involving native coronary artery of native heart with angina pectoris ([address]     2. Pulmonary asbestosis ([facility] [address]     3. Spinal stenosis of lumbar region at multiple levels     4. Mixed dyslipidemia  Lipid, Fasting    Comprehensive Metabolic Panel    CBC Auto Differential   5. Hypertensive heart disease without heart failure     6. Essential hypertension               Plan:      No follow-ups on file.     Orders Placed This Encounter Procedures    Lipid, Fasting     Standing Status:   Future     Standing Expiration Date:   11/18/2022    Comprehensive Metabolic Panel     Standing Status:   Future     Standing Expiration Date:   2/16/2022    CBC Auto Differential     Standing Status:   Future     Standing Expiration Date:   2/16/2022     No orders of the defined types were placed in this encounter. uncontolled htn and  hld  Increase lisnopirl hctz bid  And ldl is high repeat  On high dose of atorvastain  Will chage to crestor if high again  Pt to keep a log of bp and pat if high     Patient given educational materials - see patient instructions. Discussed use, benefit, and side effects of prescribed medications. All patientquestions answered. Pt voiced understanding. Reviewed health maintenance. Instructedto continue current medications, diet and exercise. Patient agreed with treatmentplan. Follow up as directed.      Electronically signed by Fadumo Denton MD on 11/18/2021 at 9:48 AM

## 2021-11-18 NOTE — PROGRESS NOTES
Visit Information    Have you changed or started any medications since your last visit including any over-the-counter medicines, vitamins, or herbal medicines? no   Are you having any side effects from any of your medications? -  no  Have you stopped taking any of your medications? Is so, why? -  no    Have you seen any other physician or provider since your last visit? Yes - Records Obtained  Have you had any other diagnostic tests since your last visit? Yes - Records Obtained  Have you been seen in the emergency room and/or had an admission to a hospital since we last saw you? No  Have you had your routine dental cleaning in the past 6 months? yes -     Have you activated your [employer] account? If not, what are your barriers?  Yes     Patient Care Team:  Norberto Garg MD as PCP - General (Internal Medicine)  Norberto Garg MD as PCP - [facility] Provider    Medical History Review  Past Medical, Family, and Social History reviewed and does contribute to the patient presenting condition    Health Maintenance   Topic Date Due    DTaP/Tdap/Td vaccine (1 - Tdap) Never done    Flu vaccine (1) 09/01/2021    Potassium monitoring  11/19/2021    Creatinine monitoring  11/19/2021    Annual Wellness Visit (AWV)  01/14/2022    Shingles Vaccine  Completed    Pneumococcal 65+ years Vaccine  Completed    COVID-19 Vaccine  Completed    Hepatitis C screen  Completed    Hepatitis A vaccine  Aged Out    Hepatitis B vaccine  Aged Out    Hib vaccine  Aged Out    Meningococcal (ACWY) vaccine  Aged Out

## 2021-11-22 RX ORDER — ATORVASTATIN CALCIUM 80 MG/1
TABLET, FILM COATED ORAL
Qty: 90 TABLET | Refills: 0 | Status: SHIPPED | OUTPATIENT
Start: 2021-11-22 | End: 2022-02-15

## 2021-12-20 RX ORDER — CARVEDILOL 12.5 MG/1
TABLET ORAL
Qty: 180 TABLET | Refills: 0 | Status: SHIPPED | OUTPATIENT
Start: 2021-12-20 | End: 2022-04-11

## 2022-01-14 RX ORDER — NITROGLYCERIN 0.4 MG/1
TABLET SUBLINGUAL
COMMUNITY
Start: 2021-11-11

## 2022-01-18 ENCOUNTER — OFFICE VISIT (OUTPATIENT)
Dept: INTERNAL MEDICINE CLINIC | Age: 77
End: 2022-01-18
Payer: MEDICARE

## 2022-01-18 VITALS
HEIGHT: 70 IN | DIASTOLIC BLOOD PRESSURE: 78 MMHG | SYSTOLIC BLOOD PRESSURE: 138 MMHG | BODY MASS INDEX: 33.21 KG/M2 | WEIGHT: 232 LBS | OXYGEN SATURATION: 97 % | HEART RATE: 72 BPM

## 2022-01-18 DIAGNOSIS — J61 PULMONARY ASBESTOSIS (HCC): ICD-10-CM

## 2022-01-18 DIAGNOSIS — Z00.00 ROUTINE GENERAL MEDICAL EXAMINATION AT A HEALTH CARE FACILITY: Primary | ICD-10-CM

## 2022-01-18 DIAGNOSIS — I25.119 CORONARY ARTERY DISEASE INVOLVING NATIVE CORONARY ARTERY OF NATIVE HEART WITH ANGINA PECTORIS (HCC): ICD-10-CM

## 2022-01-18 PROCEDURE — G0439 PPPS, SUBSEQ VISIT: HCPCS | Performed by: INTERNAL MEDICINE

## 2022-01-18 ASSESSMENT — PATIENT HEALTH QUESTIONNAIRE - PHQ9
2. FEELING DOWN, DEPRESSED OR HOPELESS: 1
SUM OF ALL RESPONSES TO PHQ QUESTIONS 1-9: 2
SUM OF ALL RESPONSES TO PHQ9 QUESTIONS 1 & 2: 2
SUM OF ALL RESPONSES TO PHQ QUESTIONS 1-9: 2
1. LITTLE INTEREST OR PLEASURE IN DOING THINGS: 1

## 2022-01-18 ASSESSMENT — LIFESTYLE VARIABLES: HOW OFTEN DO YOU HAVE A DRINK CONTAINING ALCOHOL: 0

## 2022-01-18 NOTE — PROGRESS NOTES
Visit Information    Have you changed or started any medications since your last visit including any over-the-counter medicines, vitamins, or herbal medicines? no   Are you having any side effects from any of your medications? -  no  Have you stopped taking any of your medications? Is so, why? -  no    Have you seen any other physician or provider since your last visit? Yes - Records Obtained  Have you had any other diagnostic tests since your last visit? Yes - Records Obtained  Have you been seen in the emergency room and/or had an admission to a hospital since we last saw you? No  Have you had your routine dental cleaning in the past 6 months? yes -     Have you activated your [employer] account? If not, what are your barriers?  Yes     Patient Care Team:  Emmy Madera MD as PCP - General (Internal Medicine)  Emmy Madera MD as PCP - [facility]    Medical History Review  Past Medical, Family, and Social History reviewed and does contribute to the patient presenting condition    Health Maintenance   Topic Date Due    Annual Wellness Visit (AWV)  01/14/2022    Depression Screen  03/10/2022    Lipid screen  11/18/2022    Potassium monitoring  11/18/2022    Creatinine monitoring  11/18/2022    DTaP/Tdap/Td vaccine (2 - Td or Tdap) 11/18/2031    Flu vaccine  Completed    Shingles Vaccine  Completed    Pneumococcal 65+ years Vaccine  Completed    COVID-19 Vaccine  Completed    Hepatitis C screen  Completed    Hepatitis A vaccine  Aged Out    Hepatitis B vaccine  Aged Out    Hib vaccine  Aged Out    Meningococcal (ACWY) vaccine  Aged Out

## 2022-01-18 NOTE — PROGRESS NOTES
Medicare Annual Wellness Visit  Name: Lucy Jay Date: 2022   MRN: [id number] Sex: Male   Age: 64 y.o. Ethnicity: Non- / Non    : 1945 Race: White (non-)      Lin Duggan is here for Medicare AWV    Screenings for behavioral, psychosocial and functional/safety risks, and cognitive dysfunction are all negative except as indicated below. These results, as well as other patient data from the [address] form, are documented in Flowsheets linked to this Encounter. Allergies   Allergen Reactions    Mobic [Meloxicam] Shortness Of Breath    Nsaids Shortness Of Breath         Prior to Visit Medications    Medication Sig Taking? Authorizing Provider   nitroGLYCERIN (NITROSTAT) 0.4 MG SL tablet  Yes Historical Provider, MD   carvedilol (COREG) 12.5 MG tablet TAKE 1 TABLET BY MOUTH TWICE DAILY WITH MEALS Yes Jacques Mckeon MD   atorvastatin (LIPITOR) 80 MG tablet Take 1 tablet by mouth once daily Yes Giancarlo Eason MD   lisinopril-hydroCHLOROthiazide (PRINZIDE;ZESTORETIC) 20-25 MG per tablet Take 1 tablet by mouth 2 times daily Yes Giancarlo Eason MD   vitamin C (ASCORBIC ACID) 500 MG tablet Take 500 mg by mouth daily Yes Historical Provider, MD   vitamin E 400 UNIT capsule Take 400 Units by mouth daily Yes Historical Provider, MD   magnesium (MAGNESIUM-OXIDE) 250 MG TABS tablet Take 250 mg by mouth daily Yes Historical Provider, MD   zinc 50 MG CAPS Take by mouth Yes Historical Provider, MD   TURMERIC PO Take by mouth Yes Historical Provider, MD   MULTIPLE VITAMIN PO Take by mouth daily Yes Historical Provider, MD   aspirin 81 MG tablet Take 81 mg by mouth daily. Yes Historical Provider, MD   tamsulosin (FLOMAX) 0.4 MG capsule Take 0.4 mg by mouth daily. Yes Historical Provider, MD   dutasteride (AVODART) 0.5 MG capsule Take 0.5 mg by mouth daily.  Yes Historical Provider, MD   Vitamin D (CHOLECALCIFEROL) 1000 UNITS CAPS capsule Take 10,000 Units by mouth daily. Yes Historical Provider, MD         Past Medical History:   Diagnosis Date    Acute gouty arthropathy     Backache, unspecified     Cellulitis and abscess of upper arm and forearm     Degeneration of lumbar or lumbosacral intervertebral disc     Essential hypertension, benign     Obstructive sleep apnea on CPAP     Osteoarthrosis, unspecified whether generalized or localized, unspecified site     Other and unspecified hyperlipidemia     Other malaise and fatigue     Other psoriasis     Sleep apnea     Thoracic or lumbosacral neuritis or radiculitis, unspecified     Unspecified vitamin D deficiency        Past Surgical History:   Procedure Laterality Date    JOINT REPLACEMENT Right Oct.6, 2015    right knee    SHOULDER SURGERY Bilateral rotator cuff    TONSILLECTOMY AND ADENOIDECTOMY      TURP      VASECTOMY           Family History   Problem Relation Age of Onset    Cancer Father        CareTeam (Including outside providers/suppliers regularly involved in providing care):   Patient Care Team:  Diane Kanner, MD as PCP - General (Internal Medicine)  Diane Kanner, MD as PCP - [facility] Empaneled Provider    Wt Readings from Last 3 Encounters:   01/18/22 232 lb (105.2 kg)   11/18/21 229 lb (103.9 kg)   07/27/21 223 lb (101.2 kg)     Vitals:    01/18/22 1323   BP: 138/78   Pulse: 72   SpO2: 97%   Weight: 232 lb (105.2 kg)   Height: 5' 10\" (1.778 m)     Body mass index is 33.29 kg/m². Based upon direct observation of the patient, evaluation of cognition reveals recent and remote memory intact.     Skin: warm and dry, no rash or erythema  Head: normocephalic and atraumatic  Eyes: pupils equal, round, and reactive to light, extraocular eye movements intact, conjunctivae normal  ENT: tympanic membrane, external ear and ear canal normal bilaterally, nose without deformity, nasal mucosa and turbinates normal without polyps  Neck: supple and non-tender without mass, no thyromegaly or thyroid nodules, no cervical lymphadenopathy  Pulmonary/Chest: clear to auscultation bilaterally- no wheezes, rales or rhonchi, normal air movement, no respiratory distress  Cardiovascular: normal rate, regular rhythm, normal S1 and S2, no murmurs, rubs, clicks, or gallops, distal pulses intact, no carotid bruits  Abdomen: soft, non-tender, non-distended, normal bowel sounds, no masses or organomegaly  Extremities: no cyanosis, clubbing or edema  Musculoskeletal: normal range of motion, no joint swelling, deformity or tenderness  Neurologic: reflexes normal and symmetric, no cranial nerve deficit, gait, coordination and speech normal    Patient's complete Health Risk Assessment and screening values have been reviewed and are found in Flowsheets. The following problems were reviewed today and where indicated follow up appointments were made and/or referrals ordered. Positive Risk Factor Screenings with Interventions:              General Health and ACP:  General  In general, how would you say your health is?: Good  In the past 7 days, have you experienced any of the following?  New or Increased Pain, New or Increased Fatigue, Loneliness, Social Isolation, Stress or Anger?: (!) Stress  Do you get the social and emotional support that you need?: Yes  Do you have a Living Will?: Yes  Advance Directives     Power of  Living Will ACP-Advance Directive ACP-Power of     Not on File Not on File Not on File Not on File      General Health Risk Interventions:  · none    Health Habits/Nutrition:  Health Habits/Nutrition  Do you exercise for at least 20 minutes 2-3 times per week?: Yes  Have you lost any weight without trying in the past 3 months?: No  Do you eat only one meal per day?: No  Have you seen the dentist within the past year?: Yes  Body mass index: (!) 33.28  Health Habits/Nutrition Interventions:  · Inadequate physical activity:  patient agrees to exercise for at least 150 minutes/week    Hearing/Vision:  No exam data present  Hearing/Vision  Do you or your family notice any trouble with your hearing that hasn't been managed with hearing aids?: No  Do you have difficulty driving, watching TV, or doing any of your daily activities because of your eyesight?: No  Have you had an eye exam within the past year?: (!) No  Hearing/Vision Interventions:  · none        Personalized Preventive Plan   Current Health Maintenance Status  Immunization History   Administered Date(s) Administered    COVID-19, Jacobo Mahmood PF, 30mcg/0.3mL 01/29/2021, 02/19/2021, 09/27/2021    Influenza A ([id number]) Vaccine PF IM 12/31/2009    Influenza Vaccine, unspecified formulation 10/07/2015    Influenza Virus Vaccine 09/14/2021    Influenza, High Dose (Fluzone 65 yrs and older) 10/04/2017, 09/18/2018, 09/25/2019, 09/16/2020    Influenza, High-dose, Quadv, 65 yrs +, IM (Fluzone) 09/16/2020    Influenza, Quadv, IM, PF (6 mo and older Fluzone, Flulaval, Fluarix, and 3 yrs and older Afluria) 10/07/2015    Pneumococcal Conjugate 13-valent ([id number]) 07/28/2015    Pneumococcal Conjugate 7-valent (Prevnar7) 07/26/2006    Pneumococcal Polysaccharide ([id number]) 09/14/2016    Zoster Recombinant (Shingrix) 09/25/2019, 12/16/2019        Health Maintenance   Topic Date Due    Annual Wellness Visit (AWV)  01/14/2022    Depression Screen  03/10/2022    Lipid screen  11/18/2022    Potassium monitoring  11/18/2022    Creatinine monitoring  11/18/2022    DTaP/Tdap/Td vaccine (2 - Td or Tdap) 11/18/2031    Flu vaccine  Completed    Shingles Vaccine  Completed    Pneumococcal 65+ years Vaccine  Completed    COVID-19 Vaccine  Completed    Hepatitis C screen  Completed    Hepatitis A vaccine  Aged Out    Hepatitis B vaccine  Aged Out    Hib vaccine  Aged Out    Meningococcal (ACWY) vaccine  Aged Out     Recommendations for "[employer] Due: see orders and patient instructions/AVS.  .   Recommended screening schedule for the next 5-10 years is provided to the patient in written form: see Patient Instructions/AVS.    Jessica Carmen was seen today for medicare aw.     Diagnoses and all orders for this visit:    Routine general medical examination at a health care facility    Coronary artery disease involving native coronary artery of native heart with angina pectoris ([address]    Pulmonary asbestosis ([address]  Controlled               Obese class  1  Work out recommneded  Fully vaccinated

## 2022-01-18 NOTE — PATIENT INSTRUCTIONS
Personalized Preventive Plan for Saleem Dale - 1/18/2022  Medicare offers a range of preventive health benefits. Some of the tests and screenings are paid in full while other may be subject to a deductible, co-insurance, and/or copay. Some of these benefits include a comprehensive review of your medical history including lifestyle, illnesses that may run in your family, and various assessments and screenings as appropriate. After reviewing your medical record and screening and assessments performed today your provider may have ordered immunizations, labs, imaging, and/or referrals for you. A list of these orders (if applicable) as well as your Preventive Care list are included within your After Visit Summary for your review. Other Preventive Recommendations:    · A preventive eye exam performed by an eye specialist is recommended every 1-2 years to screen for glaucoma; cataracts, macular degeneration, and other eye disorders. · A preventive dental visit is recommended every 6 months. · Try to get at least 150 minutes of exercise per week or 10,000 steps per day on a pedometer . · Order or download the FREE \"Exercise & Physical Activity: Your Everyday Guide\" from [employer]. Call 7-985.815.5165 or search [employer] online. · You need 8699-0383 mg of calcium and 5051-9348 IU of vitamin D per day. It is possible to meet your calcium requirement with diet alone, but a vitamin D supplement is usually necessary to meet this goal.  · When exposed to the sun, use a sunscreen that protects against both UVA and UVB radiation with an SPF of 30 or greater. Reapply every 2 to 3 hours or after sweating, drying off with a towel, or swimming. · Always wear a seat belt when traveling in a car. Always wear a helmet when riding a bicycle or motorcycle.

## 2022-02-15 RX ORDER — ATORVASTATIN CALCIUM 80 MG/1
TABLET, FILM COATED ORAL
Qty: 90 TABLET | Refills: 0 | Status: SHIPPED | OUTPATIENT
Start: 2022-02-15 | End: 2022-05-09

## 2022-04-11 RX ORDER — CARVEDILOL 12.5 MG/1
TABLET ORAL
Qty: 180 TABLET | Refills: 0 | Status: SHIPPED | OUTPATIENT
Start: 2022-04-11 | End: 2022-07-05

## 2022-04-26 ENCOUNTER — NURSE TRIAGE (OUTPATIENT)
Dept: OTHER | Facility: CLINIC | Age: 77
End: 2022-04-26

## 2022-04-26 NOTE — TELEPHONE ENCOUNTER
[facility] [employer] Subjective: Caller states \"lower back pain and left foot numbness\"     Current Symptoms: lower back pain and left foot numbness    Onset: 5 years ago; worsening    Associated Symptoms: increased wakefulness,      Pain Severity: 9/10; sharp, dull, aching; waxing and waning    Temperature: denies    What has been tried: ibuprofen, Voltaren don't help    LMP: NA Pregnant: NA    Recommended disposition: Go to ED/[facility] (Or to Office with PCP Approval) for further evaluation of lower back pain and left foot numbness. Care advice provided, patient verbalizes understanding; denies any other questions or concerns; instructed to call back for any new or worsening symptoms. Writer provided warm transfer to [address] [place] [address] at [address] for second level triage     Attention Provider: Thank you for allowing me to participate in the care of your patient. The patient was connected to triage in response to information provided to the ECC/PSC. Please do not respond through this encounter as the response is not directed to a shared pool.           Reason for Disposition   Weakness of a leg or foot (e.g., unable to bear weight, dragging foot)    Protocols used: BACK PAIN-ADULT-[place]

## 2022-04-27 ENCOUNTER — OFFICE VISIT (OUTPATIENT)
Dept: INTERNAL MEDICINE CLINIC | Age: 77
End: 2022-04-27
Payer: MEDICARE

## 2022-04-27 VITALS
BODY MASS INDEX: 33.18 KG/M2 | HEIGHT: 71 IN | DIASTOLIC BLOOD PRESSURE: 102 MMHG | SYSTOLIC BLOOD PRESSURE: 150 MMHG | WEIGHT: 237 LBS

## 2022-04-27 DIAGNOSIS — I25.119 CORONARY ARTERY DISEASE INVOLVING NATIVE CORONARY ARTERY OF NATIVE HEART WITH ANGINA PECTORIS (HCC): Primary | ICD-10-CM

## 2022-04-27 DIAGNOSIS — R20.0 NUMBNESS OF LEFT FOOT: ICD-10-CM

## 2022-04-27 DIAGNOSIS — G47.33 OSA (OBSTRUCTIVE SLEEP APNEA): ICD-10-CM

## 2022-04-27 DIAGNOSIS — I25.5 ISCHEMIC CARDIOMYOPATHY: ICD-10-CM

## 2022-04-27 DIAGNOSIS — I10 ESSENTIAL HYPERTENSION: ICD-10-CM

## 2022-04-27 DIAGNOSIS — M54.16 LUMBAR RADICULOPATHY: ICD-10-CM

## 2022-04-27 DIAGNOSIS — M48.061 SPINAL STENOSIS OF LUMBAR REGION AT MULTIPLE LEVELS: ICD-10-CM

## 2022-04-27 PROCEDURE — 99214 OFFICE O/P EST MOD 30 MIN: CPT | Performed by: INTERNAL MEDICINE

## 2022-04-27 RX ORDER — GABAPENTIN 300 MG/1
300 CAPSULE ORAL NIGHTLY
Qty: 30 CAPSULE | Refills: 1 | Status: SHIPPED | OUTPATIENT
Start: 2022-04-27 | End: 2022-10-24

## 2022-04-27 NOTE — PROGRESS NOTES
OFFICE VISIT  PROGRESS NOTE         Date of patient's visit: 4/27/22  Patient's Name:  Pita Munoz  YOB: 1945       Patient Care Team:  Carrie Soria MD as PCP - General (Internal Medicine)  Carrie Soria MD [facility]CP - [facility]        SUBJECTIVE     HISTORY           History of present illness     Pertinent details  added to ,       Chief Complaint   Patient presents with    Lower Back Pain     lower back pain, left foot numbness           Encounter Diagnoses   Name Primary?  Coronary artery disease involving native coronary artery of native heart with angina pectoris ([address] Yes    Essential hypertension     Ischemic cardiomyopathy     TARA (obstructive sleep apnea)     Spinal stenosis of lumbar region at multiple levels     Numbness of left foot     Lumbar radiculopathy         Symptom / problem          --history obtained from patient. -   Patient is known to have multiple medical problems including hypertension coronary artery disease ischemic cardiomyopathy which are stable  No new symptoms no chest pain palpitation      Patient is known to her lumbar spine stenosis with left radiculopathy and used to see Dr. Ricardo Hamlin in the past  In the last 2 to 3 months the symptoms of the left radicular symptoms have worsened and he wakes up in the morning with numbness  Numbness is worse in the left foot    So with the rapid increase in the symptoms we are going to work it up     MEDICATIONS:      Current Outpatient Medications   Medication Sig Dispense Refill    gabapentin (NEURONTIN) 300 MG capsule Take 1 capsule by mouth nightly for 180 days.  Intended supply: 90 days 30 capsule 1    carvedilol (COREG) 12.5 MG tablet TAKE 1 TABLET BY MOUTH TWICE DAILY WITH MEALS 180 tablet 0    atorvastatin (LIPITOR) 80 MG tablet Take 1 tablet by mouth once daily 90 tablet 0    nitroGLYCERIN (NITROSTAT) 0.4 MG SL tablet       lisinopril-hydroCHLOROthiazide (PRINZIDE;ZESTORETIC) 20-25 MG per tablet Take 1 tablet by mouth 2 times daily 180 tablet 3    vitamin C (ASCORBIC ACID) 500 MG tablet Take 500 mg by mouth daily      vitamin E 400 UNIT capsule Take 400 Units by mouth daily      magnesium (MAGNESIUM-OXIDE) 250 MG TABS tablet Take 250 mg by mouth daily      zinc 50 MG CAPS Take by mouth      TURMERIC PO Take by mouth      MULTIPLE VITAMIN PO Take by mouth daily      aspirin 81 MG tablet Take 81 mg by mouth daily.  tamsulosin (FLOMAX) 0.4 MG capsule Take 0.4 mg by mouth daily.  dutasteride (AVODART) 0.5 MG capsule Take 0.5 mg by mouth daily.  Vitamin D (CHOLECALCIFEROL) 1000 UNITS CAPS capsule Take 10,000 Units by mouth daily. No current facility-administered medications for this visit. ALLERGIES:      Allergies   Allergen Reactions    Mobic [Meloxicam] Shortness Of Breath    Nsaids Shortness Of Breath       SOCIAL HISTORY   Reviewed and no change from previous record. Kenan Swayer  reports that he quit smoking about 51 years ago. He has a 40.00 pack-year smoking history. He has never used smokeless tobacco.    FAMILY HISTORY:    Reviewed and No change from previous visit    REVIEW OF SYSTEMS:    Review of Systems        OBJECTIVE      Physical exam           Vitals:    04/27/22 1342 04/27/22 1345   BP: (!) 150/102 (!) 150/102   Site: Left Upper Arm Right Upper Arm   Weight: 237 lb (107.5 kg)    Height: 5' 10.5\" (1.791 m)          Physical Exam   Vitals:  BP (!) 150/102 (Site: Right Upper Arm)   Ht 5' 10.5\" (1.791 m)   Wt 237 lb (107.5 kg)   BMI 33.53 kg/m²                 Body mass index is 33.53 kg/m².      Vitals:    04/27/22 1342 04/27/22 1345   BP: (!) 150/102 (!) 150/102   Site: Left Upper Arm Right Upper Arm   Weight: 237 lb (107.5 kg)    Height: 5' 10.5\" (1.791 m)        General -alert, well appearing, and in no distress  Skin - normal coloration and turgor, no rashes,no suspicious skin lesions noted  Eyes - pupils equal and reactive, extraocular eye movementsintact  Ears - bilateral TM's and external ear canals normal  Nose - normal and patent, no erythema, discharge or polyps  Mouth - mucous membranes are moist, pharynx normal without lesions  Neck - supple, no significant adenopathy  Lymphatics - no palpable lymphadenopathy, no hepatosplenomegaly    Chest - clear to auscultation, no wheezes, rales or rhonchi, symmetric air entry    Heart - normal rate, regular rhythm, no murmurs, rubs, clicks or gallops    Extremities - peripheral pulses normal, no pedal edema       Abdomen - soft, nontender, nondistended, no masses or organomegaly    Back - full range of motion, notenderness, palpable spasm or pain on motion    Neurological - alert, oriented, normal speech, no focal sensory or motor deficit  Musculoskeletal - no joint tenderness, deformity or swelling                DIAGNOSTICS / INSERTS / IMAGES    [x] Reviewed       Labs      URINE ANALYSIS: No results found for: LABURIN     CBC:  Lab Results   Component Value Date    WBC 5.8 11/18/2021    HGB 13.7 11/18/2021     11/18/2021     03/01/2012        BMP:    Lab Results   Component Value Date     11/18/2021    K 3.8 11/18/2021    CL 99 11/18/2021    CO2 24 11/18/2021    BUN 17 11/18/2021    CREATININE 0.66 11/18/2021    GLUCOSE 106 11/18/2021    GLUCOSE 102 03/01/2012        No results found for: LDLC  Lab Results   Component Value Date    LABA1C 5.4 07/03/2013     No results found for: POCGLU   .     LIVER PROFILE:  Lab Results   Component Value Date    ALT 22 11/18/2021    AST 20 11/18/2021    PROT 6.7 11/18/2021    BILITOT 0.47 11/18/2021    LABALBU 4.3 11/18/2021    LABALBU 4.3 03/01/2012          Results for orders placed or performed during the hospital encounter of 11/18/21   CBC Auto Differential   Result Value Ref Range    WBC 5.8 3.5 - 11.3 k/uL    RBC 5.17 4.21 - 5.77 m/uL    Hemoglobin 13.7 13.0 - 17.0 g/dL    Hematocrit 43.9 40.7 - 50.3 %    MCV 84.9 82.6 - 102.9 fL    MCH 26.5 25.2 - 33.5 pg    MCHC 31.2 28.4 - 34.8 g/dL    RDW 13.2 11.8 - 14.4 %    Platelets 406 410 - 191 k/uL    MPV 9.6 8.1 - 13.5 fL    NRBC Automated 0.0 0.0 per 100 WBC    Differential Type NOT REPORTED     Seg Neutrophils 58 36 - 65 %    Lymphocytes 29 24 - 43 %    Monocytes 9 3 - 12 %    Eosinophils % 3 1 - 4 %    Basophils 1 0 - 2 %    Immature Granulocytes 0 0 %    Segs Absolute 3.35 1.50 - 8.10 k/uL    Absolute Lymph # 1.69 1.10 - 3.70 k/uL    Absolute Mono # 0.52 0.10 - 1.20 k/uL    Absolute Eos # 0.16 0.00 - 0.44 k/uL    Basophils Absolute 0.03 0.00 - 0.20 k/uL    Absolute Immature Granulocyte <0.03 0.00 - 0.30 k/uL    WBC Morphology NOT REPORTED     RBC Morphology NOT REPORTED     Platelet Estimate NOT REPORTED    Comprehensive Metabolic Panel   Result Value Ref Range    Glucose 106 (H) 70 - 99 mg/dL    BUN 17 8 - 23 mg/dL    CREATININE 0.66 (L) 0.70 - 1.20 mg/dL    Bun/Cre Ratio NOT REPORTED 9 - 20    Calcium 9.2 8.6 - 10.4 mg/dL    Sodium 136 135 - 144 mmol/L    Potassium 3.8 3.7 - 5.3 mmol/L    Chloride 99 98 - 107 mmol/L    CO2 24 20 - 31 mmol/L    Anion Gap 13 9 - 17 mmol/L    Alkaline Phosphatase 70 40 - 129 U/L    ALT 22 5 - 41 U/L    AST 20 <40 U/L    Total Bilirubin 0.47 0.3 - 1.2 mg/dL    Total Protein 6.7 6.4 - 8.3 g/dL    Albumin 4.3 3.5 - 5.2 g/dL    Albumin/Globulin Ratio 1.8 1.0 - 2.5    GFR Non-African American >60 >60 mL/min    GFR African American >60 >60 mL/min    GFR Comment          GFR Staging NOT REPORTED    Lipid, Fasting   Result Value Ref Range    Cholesterol, Fasting 137 <200 mg/dL    HDL 48 >40 mg/dL    LDL Cholesterol 71 0 - 130 mg/dL    Chol/HDL Ratio 2.9 <5    Triglyceride, Fasting 92 <150 mg/dL    VLDL NOT REPORTED 1 - 30 mg/dL                     VITALS INCLUDING BMI REVIEWED WITH PATIENT  Labs reviewed as noted above   Discussed with patient        ASSESSMENT / Isabelle Moe was seen today for lower back pain. Diagnoses and all orders for this visit:    Coronary artery disease involving native coronary artery of native heart with angina pectoris ([address]    Essential hypertension  Good control stable  Ischemic cardiomyopathy    TARA (obstructive sleep apnea)  Uses CPAP  Spinal stenosis of lumbar region at multiple levels  -     XR LUMBAR SPINE (2-3 VIEWS); Future  -     MRI LUMBAR SPINE WO CONTRAST; Future  -     Pinky Vazquez MD, Orthopedic Surgery, [place]    Numbness of left foot  -     XR LUMBAR SPINE (2-3 VIEWS); Future  -     MRI LUMBAR SPINE WO CONTRAST; Future  -     Pinky Vazquez MD, Orthopedic Surgery, [place]    Lumbar radiculopathy  -     XR LUMBAR SPINE (2-3 VIEWS); Future  -     MRI LUMBAR SPINE WO CONTRAST; Future  -     Pinky Vazquez MD, Orthopedic Surgery, [place]  -     gabapentin (NEURONTIN) 300 MG capsule; Take 1 capsule by mouth nightly for 180 days. Intended supply: 80 days               SALIENT  ACTIONS TODAYS VISIT       Today's office visit SALIENT ACTIONS    --Patient known to have lumbar spine spondylosis and stenosis  Rapid worsening of the symptoms including numbness of the left toe and worsening radiculopathy  Will get MRI scan  We will get x-ray of the spine  Start on gabapentin 300 mg at at bedtime  Refer to Dr. Genevieve patel--            Discussed use, benefit, and side effects of prescribed medications. [x] yes    All patient questions answered. Patient voiced understanding. Patient given educational materials - see patient instructions  [] yes         There are no discontinued medications.      Orders Placed This Encounter   Procedures    XR LUMBAR SPINE (2-3 VIEWS)     Standing Status:   Future     Standing Expiration Date:   4/27/2023    MRI LUMBAR SPINE WO CONTRAST     Standing Status:   Future     Standing Expiration Date:   4/27/2023   Nicole Forde MD, Orthopedic Surgery, [place]     Referral Priority:   Urgent     Referral Type:   Eval and Treat     Referral Reason:   Specialty Services Required     Referred to Provider:   Johnna Avalos MD     Requested Specialty:   Orthopedic Surgery     Number of Visits Requested:   1        There are no Patient Instructions on file for this visit. Medication List          Accurate as of April 27, 2022  3:52 PM. If you have any questions, ask your nurse or doctor. START taking these medications    gabapentin 300 MG capsule  Commonly known as: NEURONTIN  Take 1 capsule by mouth nightly for 180 days. Intended supply: 90 days  Started by: Alcides Bell MD        CONTINUE taking these medications    aspirin 81 MG tablet     atorvastatin 80 MG tablet  Commonly known as: LIPITOR  Take 1 tablet by mouth once daily     carvedilol 12.5 MG tablet  Commonly known as: COREG  TAKE 1 TABLET BY MOUTH TWICE DAILY WITH MEALS     dutasteride 0.5 MG capsule  Commonly known as: AVODART     lisinopril-hydroCHLOROthiazide 20-25 MG per tablet  Commonly known as: PRINZIDE;ZESTORETIC  Take 1 tablet by mouth 2 times daily     magnesium 250 MG Tabs tablet  Commonly known as: MAGNESIUM-OXIDE     MULTIPLE VITAMIN PO     nitroGLYCERIN 0.4 MG SL tablet  Commonly known as: NITROSTAT     tamsulosin 0.4 MG capsule  Commonly known as: FLOMAX     TURMERIC PO     vitamin C 500 MG tablet  Commonly known as: ASCORBIC ACID     Vitamin D 1000 units Caps capsule  Commonly known as: CHOLECALCIFEROL     vitamin E 400 UNIT capsule     zinc 50 MG Caps           Where to Get Your Medications      These medications were sent to [place] [id number]    Phone: [phone] ·   gabapentin 300 MG capsule             FOLLOW UP  PLANS     No follow-ups on file.     lAcides Bell MD  [facility]  [place] [address] [place] [address], [address] [place] [address]. Phone [phone]   Fax: [phone]  Answering Service: [phone]  Visit Information    Have you changed or started any medications since your last visit including any over-the-counter medicines, vitamins, or herbal medicines? no   Are you having any side effects from any of your medications? -  no  Have you stopped taking any of your medications? Is so, why? -  no    Have you seen any other physician or provider since your last visit? No  Have you had any other diagnostic tests since your last visit? No  Have you been seen in the emergency room and/or had an admission to a hospital since we last saw you? No  Have you had your routine dental cleaning in the past 6 months? no    Have you activated your [employer] account? If not, what are your barriers?  Yes     Patient Care Team:  Jose Ríos MD as PCP - General (Internal Medicine)  Jose Ríos MD as PCP - [facility] Provider    Medical History Review  Past Medical, Family, and Social History reviewed and does not contribute to the patient presenting condition    Health Maintenance   Topic Date Due    Lipids  11/18/2022    Potassium  11/18/2022    Creatinine  11/18/2022    Depression Screen  01/18/2023    Annual Wellness Visit (AWV)  01/19/2023    DTaP/Tdap/Td vaccine (2 - Td or Tdap) 11/18/2031    Flu vaccine  Completed    Shingles Vaccine  Completed    Pneumococcal 65+ years Vaccine  Completed    COVID-19 Vaccine  Completed    Hepatitis C screen  Completed    Hepatitis A vaccine  Aged Out    Hepatitis B vaccine  Aged Out    Hib vaccine  Aged Out    Meningococcal (ACWY) vaccine  Aged Out

## 2022-05-02 ENCOUNTER — HOSPITAL ENCOUNTER (OUTPATIENT)
Facility: CLINIC | Age: 77
Discharge: HOME | End: 2022-05-04
Payer: MEDICARE

## 2022-05-02 ENCOUNTER — HOSPITAL ENCOUNTER (OUTPATIENT)
Dept: GENERAL RADIOLOGY | Facility: CLINIC | Age: 77
Discharge: HOME | End: 2022-05-04
Payer: MEDICARE

## 2022-05-02 DIAGNOSIS — M48.061 SPINAL STENOSIS OF LUMBAR REGION AT MULTIPLE LEVELS: ICD-10-CM

## 2022-05-02 DIAGNOSIS — R20.0 NUMBNESS OF LEFT FOOT: ICD-10-CM

## 2022-05-02 DIAGNOSIS — M54.16 LUMBAR RADICULOPATHY: ICD-10-CM

## 2022-05-02 PROCEDURE — 72100 X-RAY EXAM L-S SPINE 2/3 VWS: CPT

## 2022-05-09 RX ORDER — ATORVASTATIN CALCIUM 80 MG/1
TABLET, FILM COATED ORAL
Qty: 90 TABLET | Refills: 0 | Status: SHIPPED | OUTPATIENT
Start: 2022-05-09 | End: 2022-08-05

## 2022-05-18 ENCOUNTER — OFFICE VISIT (OUTPATIENT)
Dept: INTERNAL MEDICINE CLINIC | Age: 77
End: 2022-05-18
Payer: MEDICARE

## 2022-05-18 VITALS
BODY MASS INDEX: 33.64 KG/M2 | HEIGHT: 70 IN | OXYGEN SATURATION: 98 % | WEIGHT: 235 LBS | SYSTOLIC BLOOD PRESSURE: 128 MMHG | HEART RATE: 67 BPM | DIASTOLIC BLOOD PRESSURE: 78 MMHG

## 2022-05-18 DIAGNOSIS — G47.33 OBSTRUCTIVE SLEEP APNEA ON CPAP: ICD-10-CM

## 2022-05-18 DIAGNOSIS — M54.40 CHRONIC MIDLINE LOW BACK PAIN WITH SCIATICA, SCIATICA LATERALITY UNSPECIFIED: Primary | ICD-10-CM

## 2022-05-18 DIAGNOSIS — Z99.89 OBSTRUCTIVE SLEEP APNEA ON CPAP: ICD-10-CM

## 2022-05-18 DIAGNOSIS — G89.29 CHRONIC MIDLINE LOW BACK PAIN WITH SCIATICA, SCIATICA LATERALITY UNSPECIFIED: Primary | ICD-10-CM

## 2022-05-18 DIAGNOSIS — M48.061 SPINAL STENOSIS OF LUMBAR REGION AT MULTIPLE LEVELS: ICD-10-CM

## 2022-05-18 DIAGNOSIS — M51.37 DEGENERATION OF LUMBAR OR LUMBOSACRAL INTERVERTEBRAL DISC: ICD-10-CM

## 2022-05-18 DIAGNOSIS — I25.119 CORONARY ARTERY DISEASE INVOLVING NATIVE CORONARY ARTERY OF NATIVE HEART WITH ANGINA PECTORIS (HCC): ICD-10-CM

## 2022-05-18 PROCEDURE — 99214 OFFICE O/P EST MOD 30 MIN: CPT | Performed by: INTERNAL MEDICINE

## 2022-05-18 ASSESSMENT — ENCOUNTER SYMPTOMS: BACK PAIN: 1

## 2022-05-18 NOTE — PROGRESS NOTES
[address]  [place] [address]  Dept: [phone]  Dept Fax: [phone]    Elvin Yates is a 76 y.o. male who presents today for his medical conditions/complaintsas noted below. Elvin Yates is c/o of   Chief Complaint   Patient presents with    Hypertension    Back Pain         HPI:     Back pain  Onset more than 1year ago  Severity is moderate to severe  Not associated wt lossbut associated with radiation of pain on the legs  No bowel bladder incontinence   Aggravated with bending and better with pain meds and rest.  unControlled with current pain meds.   Failed pt        Hemoglobin A1C (%)   Date Value   07/03/2013 5.4             ( goal A1Cis < 7)   No results found for: LABMICR  LDL Cholesterol (mg/dL)   Date Value   11/18/2021 71   07/23/2020 70   09/25/2019 61     LDL Calculated (mg/dL)   Date Value   02/11/2016 213 (A)       (goal LDL is <100)   AST (U/L)   Date Value   11/18/2021 20     ALT (U/L)   Date Value   11/18/2021 22     BUN (mg/dL)   Date Value   11/18/2021 17     BP Readings from Last 3 Encounters:   05/18/22 128/78   04/27/22 (!) 150/102   01/18/22 138/78          (goal 120/80)    Past Medical History:   Diagnosis Date    Acute gouty arthropathy     Backache, unspecified     Cellulitis and abscess of upper arm and forearm     Degeneration of lumbar or lumbosacral intervertebral disc     Essential hypertension, benign     Obstructive sleep apnea on CPAP     Osteoarthrosis, unspecified whether generalized or localized, unspecified site     Other and unspecified hyperlipidemia     Other malaise and fatigue     Other psoriasis     Sleep apnea     Thoracic or lumbosacral neuritis or radiculitis, unspecified     Unspecified vitamin D deficiency       Past Surgical History:   Procedure Laterality Date    JOINT REPLACEMENT Right Oct.6, 2015    right knee    SHOULDER SURGERY Bilateral rotator cuff    TONSILLECTOMY AND ADENOIDECTOMY      TURP      VASECTOMY         Family History   Problem Relation Age of Onset    Cancer Father        Social History     Tobacco Use    Smoking status: Former Smoker     Packs/day: 2.00     Years: 20.00     Pack years: 40.00     Quit date: 1970     Years since quittin.5    Smokeless tobacco: Never Used   Substance Use Topics    Alcohol use: No     Alcohol/week: 0.0 standard drinks      Current Outpatient Medications   Medication Sig Dispense Refill    atorvastatin (LIPITOR) 80 MG tablet Take 1 tablet by mouth once daily 90 tablet 0    gabapentin (NEURONTIN) 300 MG capsule Take 1 capsule by mouth nightly for 180 days. Intended supply: 90 days 30 capsule 1    carvedilol (COREG) 12.5 MG tablet TAKE 1 TABLET BY MOUTH TWICE DAILY WITH MEALS 180 tablet 0    nitroGLYCERIN (NITROSTAT) 0.4 MG SL tablet       lisinopril-hydroCHLOROthiazide (PRINZIDE;ZESTORETIC) 20-25 MG per tablet Take 1 tablet by mouth 2 times daily 180 tablet 3    vitamin C (ASCORBIC ACID) 500 MG tablet Take 500 mg by mouth daily      vitamin E 400 UNIT capsule Take 400 Units by mouth daily      magnesium (MAGNESIUM-OXIDE) 250 MG TABS tablet Take 250 mg by mouth daily      zinc 50 MG CAPS Take by mouth      TURMERIC PO Take by mouth      MULTIPLE VITAMIN PO Take by mouth daily      aspirin 81 MG tablet Take 81 mg by mouth daily.  tamsulosin (FLOMAX) 0.4 MG capsule Take 0.4 mg by mouth daily.  dutasteride (AVODART) 0.5 MG capsule Take 0.5 mg by mouth daily.  Vitamin D (CHOLECALCIFEROL) 1000 UNITS CAPS capsule Take 10,000 Units by mouth daily. No current facility-administered medications for this visit.      Allergies   Allergen Reactions    Mobic [Meloxicam] Shortness Of Breath    Nsaids Shortness Of Breath       Health Maintenance   Topic Date Due    Lipids  2022    Depression Screen  2023    Annual Wellness Visit (AWV)  2023    DTaP/Tdap/Td vaccine (2 - Td or Tdap) 11/18/2031    Flu vaccine  Completed    Shingles vaccine  Completed    Pneumococcal 65+ years Vaccine  Completed    COVID-19 Vaccine  Completed    Hepatitis C screen  Completed    Hepatitis A vaccine  Aged Out    Hepatitis B vaccine  Aged Out    Hib vaccine  Aged Out    Meningococcal (ACWY) vaccine  Aged Out       Subjective:     Review of Systems   Musculoskeletal: Positive for arthralgias and back pain. Objective:     Physical Exam  Constitutional:       Appearance: He is well-developed. He is not diaphoretic. HENT:      Head: Normocephalic and atraumatic. Eyes:      General:         Right eye: No discharge. Left eye: No discharge. Extraocular Movements:      Right eye: Normal extraocular motion. Left eye: Normal extraocular motion. Conjunctiva/sclera: Conjunctivae normal.      Right eye: Right conjunctiva is not injected. Left eye: Left conjunctiva is not injected. Neck:      Thyroid: No thyroid mass or thyromegaly. Vascular: No JVD. Cardiovascular:      Rate and Rhythm: Normal rate and regular rhythm. Heart sounds: No murmur heard. No friction rub. Pulmonary:      Effort: Pulmonary effort is normal. No tachypnea, bradypnea, accessory muscle usage or respiratory distress. Breath sounds: Normal breath sounds. No wheezing or rales. Abdominal:      General: Bowel sounds are normal. There is no distension. Palpations: Abdomen is soft. Tenderness: There is no abdominal tenderness. There is no rebound. Musculoskeletal:         General: No tenderness. Normal range of motion. Cervical back: Normal range of motion and neck supple. No edema or erythema. Lymphadenopathy:      Head:      Right side of head: No submental or submandibular adenopathy. Left side of head: No submental or submandibular adenopathy. Cervical: No cervical adenopathy. Skin:     General: Skin is warm. Coloration: Skin is not pale.       Findings: Patient given educational materials - see patient instructions. Discussed use, benefit, and side effects of prescribed medications. All patientquestions answered. Pt voiced understanding. Reviewed health maintenance. Instructedto continue current medications, diet and exercise. Patient agreed with treatmentplan. Follow up as directed. Please note that this chart was generated using voice recognition Dragon dictation software. Although every effort was made to ensure the accuracy of this automated transcription, some errors in transcription may have occurred.      Electronically signed by Kat Girard MD on 5/18/2022 at 1:40 PM

## 2022-05-18 NOTE — PROGRESS NOTES
Visit Information    Have you changed or started any medications since your last visit including any over-the-counter medicines, vitamins, or herbal medicines? no   Are you having any side effects from any of your medications? -  no  Have you stopped taking any of your medications? Is so, why? -  no    Have you seen any other physician or provider since your last visit? Yes - Records Obtained  Have you had any other diagnostic tests since your last visit? Yes - Records Obtained  Have you been seen in the emergency room and/or had an admission to a hospital since we last saw you? No  Have you had your routine dental cleaning in the past 6 months? no    Have you activated your [employer] account? If not, what are your barriers?  Yes     Patient Care Team:  Eugene Quispe MD as PCP - General (Internal Medicine)  Eugene Quispe MD as PCP - [facility]    Medical History Review  Past Medical, Family, and Social History reviewed and does contribute to the patient presenting condition    Health Maintenance   Topic Date Due    Lipids  11/18/2022    Depression Screen  01/18/2023    Annual Wellness Visit (AWV)  01/19/2023    DTaP/Tdap/Td vaccine (2 - Td or Tdap) 11/18/2031    Flu vaccine  Completed    Shingles vaccine  Completed    Pneumococcal 65+ years Vaccine  Completed    COVID-19 Vaccine  Completed    Hepatitis C screen  Completed    Hepatitis A vaccine  Aged Out    Hepatitis B vaccine  Aged Out    Hib vaccine  Aged Out    Meningococcal (ACWY) vaccine  Aged Out

## 2022-05-25 ENCOUNTER — HOSPITAL ENCOUNTER (OUTPATIENT)
Dept: CT IMAGING | Facility: CLINIC | Age: 77
Discharge: HOME | End: 2022-05-27
Payer: MEDICARE

## 2022-05-25 DIAGNOSIS — G89.29 CHRONIC MIDLINE LOW BACK PAIN WITH SCIATICA, SCIATICA LATERALITY UNSPECIFIED: ICD-10-CM

## 2022-05-25 DIAGNOSIS — M54.40 CHRONIC MIDLINE LOW BACK PAIN WITH SCIATICA, SCIATICA LATERALITY UNSPECIFIED: ICD-10-CM

## 2022-05-25 PROCEDURE — 72131 CT LUMBAR SPINE W/O DYE: CPT

## 2022-06-02 ENCOUNTER — HOSPITAL ENCOUNTER (OUTPATIENT)
Dept: PHYSICAL THERAPY | Age: 77
Setting detail: THERAPIES SERIES
Discharge: HOME | End: 2022-06-02
Payer: MEDICARE

## 2022-06-02 PROCEDURE — 97162 PT EVAL MOD COMPLEX 30 MIN: CPT

## 2022-06-02 NOTE — PROGRESS NOTES
[facility] AT [facility]  [address]         Phone: [phone]       Fax: [phone]     Physical Therapy Spine Evaluation    Date:  2022  Patient: Margarito Munoz  :   MRN: [id number]   Physician: Genevieve Hoffman MD  Insurance: [facility] MEDICARE, $15 PER VISIT BASED ON MEDICAL NECESSITY PER APPOINTMENT NOTE  Medical Diagnosis:  M54.40, G89.29 (ICD-10-CM) - Chronic midline low back pain with sciatica, sciatica laterality unspecified   Rehab Codes: M25.60 -BACK STIFFNESS, M54.50 -LOW BACK PAIN, R26.2 -DIFF WALKING, R29.3 - ABNORMAL POSTURE  Onset Date: ONGOING  Next 's appt. : 2022    Subjective:   CC: PATIENT REPORTS LOW BACK PAIN INCREASED BY STANDING AND WALKING. HE CAN ONLY STAND 5 TO 10 MINUTES AND THEN MUST SIT DUE TO HIS PAIN. HIS PAIN IS DECREASED BY SLOUCHED SITTING AND \"FETAL POSITION\". PATIENT STATES HE HAS SHOULDER PAIN IN SIDELYING POSITION AND THEREFORE CANNOT SLEEP LONG ON HIS SIDES. PATIENT'S SLEEP IS INTERRUPTED BY PAIN AND HE DOES NOT GET GOOD REST. HE NOW HAS TO TAKE NAPS DUE TO FATIGUE. HE ALSO EXPERIENCES LEFT > RIGHT FOOT NUMB AFTER STANDING 5-10 MINUTES. PATIENT STATES HIS WIFE HAS DEMENTIA AND HE NOW HAS TO DO ALL THE HOUSEKEEPING. DUE TO HIS BACK PAIN HE MUST SIT AND TAKE FREQUENT BREAKS. HE STATES HE USED TO ENJOY RUNNING AND LATER WALKING FOR EXERCISE. HE IS NO LONGER ABLE TO WALK FOR EXERCISE AND RIDES A STATIONARY BIKE INSTEAD. HPI: PATIENT REPORTS A 36 YEAR HISTORY OF INTERMITTENT BACK PAIN OF UNKNOWN ORIGIN. HE DENIES ACCIDENTS OR INJURY. HE STATES HE IS SEEKING MEDICAL ATTENTION NOW BECAUSE HIS PAIN HAS BECOME SO SEVERE. PREVIOUSLY HE COULD LAY DOWN 10-15 MINUTES AND THE PAIN GO AWAY. HE NO LONGER GETS RELIEF FROM HIS PAIN WHEN HE GOES TO BED. PATIENT STATES HE JUST WANT TO GET SOMETHING DONE TO IMPROVE HIS PAIN BUT MUST ATTEND PHYSICAL THERAPY FIRST PER INSURANCE REQUIREMENTS.     PAST TREATMENTS INCLUDE: PHYSICAL THERAPY, CHIROPRACTIC TREATMENT AND INJECTIONS AT THE PAIN CLINIC     PMHx: [] Unremarkable [] Diabetes [x] HTN  [] Pacemaker   [x] MI/Heart Problems 5 YEARS AGO [] Cancer [x] Arthritis [] Other:              [] Refer to full medical chart  In EPIC   Past medical history of Acute gouty arthropathy, Backache, unspecified, Cellulitis and abscess of upper arm and forearm, Degeneration of lumbar or lumbosacral intervertebral disc, Essential hypertension, benign, Obstructive sleep apnea on CPAP, Osteoarthrosis, unspecified whether generalized or localized, unspecified site, Other and unspecified hyperlipidemia, Other malaise and fatigue, Other psoriasis, Sleep apnea, Thoracic or lumbosacral neuritis or radiculitis, unspecified, and Unspecified vitamin D deficiency. Past surgical history that includes Tonsillectomy and adenoidectomy; shoulder surgery (Bilateral, rotator cuff); Vasectomy; TURP; and RIGHT KNEE joint replacement (Right, Oct.6, 2015). Comorbidities:   [x] Obesity [] Dialysis  [] Other:   [] Asthma/COPD [] Dementia [] Other:   [] Stroke [x] Sleep apnea [] Other:   [] Vascular disease [] Rheumatic disease [] Other:     Tests: [x] X-Ray: [] MRI:  [x] Other:  Ct lumbar spine 5/18/2022  Impression   1. Multilevel lumbar spondylosis resulting in upwards of moderate canal   stenosis at L3-4.   2. Advanced multilevel endplate degenerative changes and disc height loss   throughout the lumbar spine. XR LUMBAR SPINE 4/27/2022  Impression   Multilevel degenerative changes seen throughout the lumbar spine with   curvature with convexity to the left.  No fracture or dislocation.          Medications: [x] Refer to full medical record [] None [x] Other: OTC FOR THIS PROBLEM  Allergies:      [x] Refer to full medical record [] None [] Other:    Function:  Hand Dominance  [x] Right  [] Left  Working:  [] Normal Duty  [] Light Duty  [] Off D/T Condition  [x] Retired  [] Not Employed []  Disability  [] Other:           Return to work:   Job/ADL Description: WORKED AT [address] [place] [address]. PATIENT RAN FOR EXERCISE FOR MANY YEARS AND AT 58 BEGAN A WALKING PROGRAM.  HIS WIFE HAS DEMENTIA AND HE CARES FOR HER. HE DOES ALL THE HOUSEWORK.       ADL/IADL Previous level of function Current level of function Who currently assists the patient with task   Bathing  [x] Independent  [] Assist [x] Independent  [] Assist    Dress/grooming [x] Independent  [] Assist [x] Independent  [] Assist    Transfer/mobility [x] Independent  [] Assist [x] Independent  [] Assist    Feeding [x] Independent  [] Assist [x] Independent  [] Assist    Toileting [x] Independent  [] Assist [x] Independent  [] Assist    Driving [x] Independent  [] Assist [x] Independent  [] Assist    Housekeeping [x] Independent  [] Assist [x] Independent  [] Assist HAS TO TAKE EXTRA BREAKS DUE TO PAIN, REQUIRES EXTRA TIME   Grocery shop/meal prep [x] Independent  [] Assist [x] Independent  [] Assist HAS TO USE A CART TO SUPPORT HIS BACK     Gait Prior level of function Current level of function    [x] Independent  [] Assist [x] Independent  [] Assist   Device: [x] Independent [x] Independent    [] Straight Cane [] Quad cane [] Straight Cane [] Quad cane    [] Standard walker [] Rolling walker   [] 4 wheeled walker [] Standard walker [x] Rolling walker DOESN'T USE  [] 4 wheeled walker    [] Wheelchair [] Wheelchair     Pain:  [x] Yes  [] No Location: LOW BACK PAIN Pain Rating: (0-10 scale) (RANGES 2/10 TO 7-8/10)  Pain altered Tx:  [] Yes  [x] No  Action:    Symptoms:  [] Improving [x] Worsening [] Same  Better:  [] AM    [] PM    [x] Sit (SLOUCHED)   [] Rise/Sit    []Stand    [] Walk    [] Lying    [] Other:  Worse: [] AM    [] PM    [x] Sit (ERECT) [] Rise/Sit    [x]Stand    [x] Walk    [] Lying    [] Bend                             [] Valsalva    [] Other:  Sleep: [] OK    [x] Disturbed    Objective:   STRENGTH  ROM    Left Right Cervical    L1-2 Hip Flex 5 5 Flexion    Hip Abd 5 5 Extension    L3-4 Knee Ext 5 5 Rotation L R   L4 Ankle DF 5 5 Sidebend L R   L5 EHL   Retraction    S1 Plant. Flex 5 5 Lumbar    Abdominals   Flexion 50% OF NORM   Erector Spinae   Extension <25%      Rotation L  25% R 25%      Sidebend L 25% R 25%      LE AROM WFL WFL                 TESTS (+/-) LEFT RIGHT Not Tested   SLR [x] sit [] supine - - []   Hamstring (SLR) 50* 50* []   SKTC   []   DKTC   []   Slump/Dural   []   SI JT   []   EULOGIO   []   Joint Mobility   []   Cerv. Comp   []   Cerv. Distraction   []   Cerv. Alar/Transverse   []   Vertebral Artery   []   Adsons   []   Orville Pricila   []   Dexter Tests ? Pain ?  Pain No Change Not Tested   RFIS [x] [] [] []   RENE [x] [] [] []   RFIL [] [] [] []   REIL [] [] [] []   Rep Prot [] [] [] []   Rep Retract [] [] [] []       OBSERVATION No Deficit Deficit Not Tested Comments   Posture       Forward Head [] [x] []    Rounded Shoulders [] [x] [] L SHOULDER ELEVATED>R   Kyphosis [] [x] [] DECREASED   Lordosis [] [x] [] DECREASED   Lateral Shift [] [x] [] LEFT   Scoliosis [] [] [x]    Iliac Crest [] [x] [] R>L   PSIS [] [x] [] R>L   ASIS [] [] [x]    Genu Valgus [x] [] []    Genu Varus [x] [] []    Genu Recurvatum [] [x] [] STANDINGS WITH KNEES FLEXED   Pronation [] [] [x]    Supination [] [] [x]    Leg Length Discrp [] [] [x]    Slumped Sitting [x] [] [] DECREASES PAIN   Palpation [] [x] [] PARASPINAL MUSCLE GUARDING   Sensation [] [x] [] INTERMITTENT NUMBNESS B FEET L>R   Edema [] [x] [] MILD ANKLE   Neurological [] [] [x]                FUNCTION Normal Difficult Unable   Sitting [] [x] []   Standing [] [] [x]   Ambulation [] [] [x]   Groom/Dress [] [x] []   Lift/Carry [] [x] []   Stairs [x] [] []   Bending [] [x] []   OH reach [] [x] []   Sit to Stand [x] [] []     Functional Assessment Used: OSWESTRY  Current Status Score: 27/50 = 54%      Assessment: Patient PRESENTS WITH SEVERE LIMITATIONS IN TRUNK ROM, ABNORMAL POSTURE, POOR TOLERANCE TO STANDING AND WALKING  HE would continue to benefit from skilled physical therapy services in order to: ADDRESS THE LISTED PROBLEMS AND WORK TOWARD THE STATED GOALS    Problems:    [x] ? Back Pain:    [] ? Cervical Pain:    [x] ? ROM:     [] ? Strength:   [x] ? Function:  [x] Postural Deviations  [x] Gait Deviations  [] Other:    STG: (to be met in 8-12 treatments)  1. ? Pain: NO GREATER THAN 4/10 AT NIGHT FOR IMPROVED REST  2. ? ROM: 50% ALL PLANES  3. ? Strength:  4. ? Function: IMPROVE OSWESTRY (27) BY 7  5. Independent with Home Exercise Programs  6. \  LTG: (to be met in TBD treatments)  1.   2.   Patient goals: DECREASE PAIN      Evaluation Complexity:  History (Personal factors, comorbidities) [] 0 [x] 1-2 [] 3+   Exam (limitations, restrictions) [] 1-2 [x] 3 [] 4+   Clinical presentation (progression) [] Stable [x] Evolving  [] Unstable   Decision Making [] Low [x] Moderate [] High    [] Low Complexity [x] Moderate Complexity [] High Complexity       Rehab Potential:  [] Good  [x] Fair  [] Poor   Suggested Professional Referral:  [x] No  [] Yes:  Barriers to Goal Achievement[de-identified]  [] No  [x] Yes: SEVERE DEGENERATIVE CHANGES OF SPINE  Domestic Concerns:  [x] No  [] Yes:    Pt. Education:  [x] Plans/Goals, Risks/Benefits discussed  [x] Home exercise program  Method of Education: [x] Verbal  [x] Demo  [] Written  Comprehension of Education:  [x] Verbalizes understanding. [x] Demonstrates understanding. [] Needs Review. [] Demonstrates/verbalizes understanding of HEP/Ed previously given.     Treatment Plan:  [x] Therapeutic Exercise   46654  [] Iontophoresis: 4 mg/mL Dexamethasone Sodium Phosphate  mAmin  99739   [] Therapeutic Activity  99869 [] Vasopneumatic cold with compression  07441    [] Gait Training   85244 [] Ultrasound   60447   [] Neuromuscular Re-education  91244 [] Electrical Stimulation Unattended  77518   [] Manual Therapy  06189 [] Electrical Stimulation Attended  40618   [x] Instruction in HEP  [] Lumbar/Cervical Traction  [id number]   [x] Aquatic Therapy   44639 [] Cold/hotpack    [] Massage   09804      [] Dry Needling, 1 or 2 muscles  41345       Frequency:  2 x/week for 8-12 visits    Todays Treatment:    Precautions: B TOTAL SHOULDER REPLACEMENTS  Exercises: INSTRUCTION IN HOME PROGRAM  Exercise Reps/ Time Weight/ Level Comments   STANDING EXTENSION 10X  TID HEP               Other: TRIAL OF CANE FOR IMPROVED TOLERANCE TO WALKING. PATIENT STATE USING THE CANE INCREASES HIS SHOULDER PAIN. Specific Instructions for next treatment: [address], CORE STRENGTHENING AND IMPROVED TOLERANCE TO STANDING AND WALKING. Treatment Charges: Mins Units   [x] Evaluation       []  Low       [x]  Moderate       []  High 40 1   []  Modalities     []  Ther Exercise     []  Manual Therapy     []  Ther Activities     []  Aquatics     []  Neuromuscular     []  Gait Training     []  Dry Needling           1-2 muscles     []  Dry Needling           3 or more muscles     [] Vasocompression     TOTAL 40 1         Time in: 800      Time out: 840    Electronically signed by: Raciel Freedman PT        Physician Signature:________________________________Date:__________________  By signing above or cosigning this note, I have reviewed this plan of care and certify a need for medically necessary rehabilitation services. Janis Fall Risk Assessment    Risk Factor Scale   Score   History of Falls [] Yes  [] No 25  0 0   Secondary Diagnosis [] Yes  [] No 15  0 0   Ambulatory Aid [] Furniture  [] Crutches/cane/walker  [] None/bedrest/wheelchair/nurse 30  15  0 0   IV/Heparin Lock [] Yes  [] No 20  0 0   Gait/Transferring [] Impaired  [] Weak  [] Normal/bedrest/immobile 20  10  0 0   Mental Status [] Forgets limitations  [] Oriented to own ability 15  0 0         Total: 0      Based on the Assessment score: check the appropriate box.      [x] No intervention needed                                  Low =              Score of 0-24     []         Use standard prevention interventions           Moderate =     Score of 24-44              [] Give patient handout and discuss fall prevention strategies              [] Establish goal of education for patient/family RE: fall prevention strategies     []         Use high risk prevention interventions           High = Score of 45 and higher              [] Give patient handout and discuss fall prevention strategies              [] Establish goal of education for patient/family Re: fall prevention strategies              [] Discuss lifeline / other resources

## 2022-06-06 ENCOUNTER — HOSPITAL ENCOUNTER (OUTPATIENT)
Dept: PHYSICAL THERAPY | Age: 77
Setting detail: THERAPIES SERIES
Discharge: HOME | End: 2022-06-06
Payer: MEDICARE

## 2022-06-06 PROCEDURE — 97113 AQUATIC THERAPY/EXERCISES: CPT

## 2022-06-06 NOTE — FLOWSHEET NOTE
[address] [facility]   [facility]   Phone: [phone]   Fax: [phone]    Physical Therapy Daily Treatment Note      Date:  2022  Patient Name:  Lenin Oliva    :    MRN: [id number]  Physician: Radha Romo MD  Insurance: T MEDICARE, $15 PER VISIT BASED ON MEDICAL NECESSITY PER APPOINTMENT NOTE  Medical Diagnosis:   M54.40, G89.29 (ICD-10-CM) - Chronic midline low back pain with sciatica, sciatica laterality unspecified             Rehab Codes: M25.60 -BACK STIFFNESS, M54.50 -LOW BACK PAIN, R26.2 -DIFF WALKING, R29.3 - ABNORMAL POSTURE  Onset Date: ONGOING                     Next 's appt. : 2022  Visit# / total visits:   Cancels/No Shows: 0/0    Subjective:  Pt reports having a good morning so far. States pain usually excruciating when he first wakes. States pain improves when he rides his stationary bike and gets moving for the day. Pain:  [x] Yes  [] No Location: Lumbar back   Pain Rating: (0-10 scale) 5/10  Pain altered Tx:  [x] No  [] Yes  Action:  Comments:  Initial aquatic therapy visit. Educated on postural awareness and core stability. Also educated on benefits of aquatic therapy and working in pain free ranges. Objective:      [address]  Services Exercise Log  Aquatic, Hip & DLS Program- Phase 1    Date of Eval:                                Primary PT:  Diagnosis: Things to Focus On (goals):   Surgical Precautions:  Medical Precautions:  [] C-9 dates  [] Occ Med   [] Medicare       Date 22       Visit # 2/12       Walk F/L/R 2 Laps       Marching 10x       Squats 10x5\"       Step-Ups F/L        Step Down F/L        Heel-toe raises 10x       SLR F/L/R 10x       Hip/Knee Flex/Ext 10x       F/L Lunges                Kickboard Ex.  Lg       Iso Abd. 10x5\"       Push-pull 10x       Paddling                UE Format:        Horiz Abd/Add        IR/ER (wipers)        Alt Flex/Ext        Alt Press Down        Abd/Add                Deep Water: 1 Noodle       Hang 5'       Cycling        Jacks        X-Country                Balance        SLS                Stretches        Achllies 2x20\"       Hamstring 3x20\"               Cool Down 2 Laps       Pain Rating 5           Specific Instructions for next treatment: Assess tolerance to initial aquatic therapy visit and progress as patient is able. Assessment: [x] Progressing toward goals. Initiated aquatic therapy for lumbar back pain. Emphasis throughout on postural awareness and core stability throughout program.  Good posture throughout with minimal cueing needed. Good technique with all exercises performed. Ended with deep water hang for pain relief with decreased pain afterwards. Notes legs feeling heavy when exiting pool. [] No change. [] Other:    [] Patient would continue to benefit from skilled physical therapy services in order to: Address TRUNK ROM, ABNORMAL POSTURE, POOR TOLERANCE TO STANDING AND WALKING AND WORK TOWARD THE STATED GOALS    STG: (to be met in 8-12 treatments)  1. ? Pain: NO GREATER THAN 4/10 AT NIGHT FOR IMPROVED REST  2. ? ROM: 50% ALL PLANES  3. ? Strength:  4. ? Function: IMPROVE OSWESTRY (27) BY 7  5. Independent with Home Exercise Programs  6. \  LTG: (to be met in TBD treatments)  1.    2.    Patient goals: DECREASE PAIN    Pt. Education:  [x] Yes  [] No  [x] Reviewed Prior HEP/Ed  Method of Education: [x] Verbal  [x] Demo  [] Written  Comprehension of Education:  [x] Verbalizes understanding. [x] Demonstrates understanding. [] Needs review. [] Demonstrates/verbalizes HEP/Ed previously given. Plan: [x] Continue per plan of care.    [] Other:      Treatment Charges: Mins Units   []  Modalities     []  Ther Exercise     []  Manual Therapy     []  Ther Activities     [x]  Aquatics 28 2   []  Neuromuscular     [] Vasocompression     [] Gait Training     [] Dry needling        [] 1 or 2 muscles        [] 3 or more muscles     []  Other     Total Treatment time 28 2     Time In:  5387          Time Out: 9933    Electronically signed by:  Herrera Cardozo PTA

## 2022-06-07 ENCOUNTER — NURSE ONLY (OUTPATIENT)
Dept: FAMILY MEDICINE CLINIC | Age: 77
End: 2022-06-07

## 2022-06-07 ENCOUNTER — HOSPITAL ENCOUNTER (OUTPATIENT)
Age: 77
Setting detail: SPECIMEN
Discharge: HOME | End: 2022-06-07

## 2022-06-07 ENCOUNTER — TELEPHONE (OUTPATIENT)
Dept: INTERNAL MEDICINE CLINIC | Age: 77
End: 2022-06-07

## 2022-06-07 DIAGNOSIS — B34.9 VIRAL SYNDROME: Primary | ICD-10-CM

## 2022-06-07 DIAGNOSIS — B34.9 VIRAL SYNDROME: ICD-10-CM

## 2022-06-07 NOTE — TELEPHONE ENCOUNTER
----- Message from [employer] sent at 6/7/2022  1:23 PM EDT -----  Subject: Message to Provider    QUESTIONS  Information for Provider? Patient was exposed to someone with covid June 5th now he has symptoms of sore throat , congestion and has appt on June 9th with us and thinking he needs to reschedule it. Plus callhim back   about where to go to get tested. ---------------------------------------------------------------------------  --------------  [place] INFO  What is the best way for the office to contact you? OK to leave message on   voicemail  Preferred Call Back Phone Number? [phone]  ---------------------------------------------------------------------------  --------------  SCRIPT ANSWERS  Relationship to Patient?  Self

## 2022-06-08 ENCOUNTER — TELEPHONE (OUTPATIENT)
Dept: INTERNAL MEDICINE CLINIC | Age: 77
End: 2022-06-08

## 2022-06-08 ENCOUNTER — APPOINTMENT (OUTPATIENT)
Dept: PHYSICAL THERAPY | Age: 77
End: 2022-06-08
Payer: MEDICARE

## 2022-06-08 NOTE — TELEPHONE ENCOUNTER
Ask to wait and watch  If not better come to [facility] marita and madina get tested

## 2022-06-09 NOTE — TELEPHONE ENCOUNTER
Patient called back upset stating the he found out why the order had been canceled and states that it's incorrect. Patient said he has been try to work on this issue for days now and wants us to forget about it ! States that he refuses to go to ER and that he is 76years old and will just die. I Tried to apologize to patient for his frustration but he hung up on me.

## 2022-06-09 NOTE — TELEPHONE ENCOUNTER
Patient called back upset wanting to know why his test got cancelled. I told the patient exactly what the lab said and what Dr. Claudia Perdomo said. Patient said he rather die then go to the emergency room. I gave him the lab phone number and he stated he was going to call to see why it was cancelled.

## 2022-06-13 ENCOUNTER — APPOINTMENT (OUTPATIENT)
Dept: PHYSICAL THERAPY | Age: 77
End: 2022-06-13
Payer: MEDICARE

## 2022-06-15 ENCOUNTER — APPOINTMENT (OUTPATIENT)
Dept: PHYSICAL THERAPY | Age: 77
End: 2022-06-15
Payer: MEDICARE

## 2022-06-20 ENCOUNTER — HOSPITAL ENCOUNTER (OUTPATIENT)
Dept: PHYSICAL THERAPY | Age: 77
Setting detail: THERAPIES SERIES
Discharge: HOME | End: 2022-06-20
Payer: MEDICARE

## 2022-06-20 PROCEDURE — 97113 AQUATIC THERAPY/EXERCISES: CPT

## 2022-06-20 NOTE — FLOWSHEET NOTE
[address] [facility]   [address]   Phone: [phone]   Fax: [phone]    Physical Therapy Daily Treatment Note      Date:  2022  Patient Name:  Lenin Oliva    :    MRN: [id number]  Physician: Radha Romo MD  Insurance: [employer] MEDICARE, $15 PER VISIT BASED ON MEDICAL NECESSITY PER APPOINTMENT NOTE  Medical Diagnosis:   M54.40, G89.29 (ICD-10-CM) - Chronic midline low back pain with sciatica, sciatica laterality unspecified             Rehab Codes: M25.60 -BACK STIFFNESS, M54.50 -LOW BACK PAIN, R26.2 -DIFF WALKING, R29.3 - ABNORMAL POSTURE  Onset Date: ONGOING                     Next 's appt. : 2022  Visit# / total visits: 3/12  Cancels/No Shows: 0/0    Subjective:  Pt reports no increased pain after initial aquatic therapy visit. States felt good afterwards but got sick the following week and was unable to come in since then. States he thinks it was COVID but \"the lab was unable to run my test.\"   States he has been inactive duty to not feeling well. Pain:  [x] Yes  [] No Location: Lumbar back   Pain Rating: (0-10 scale) 4/10  Pain altered Tx:  [x] No  [] Yes  Action:  Comments:  reviewed postural awareness and core stability. Also educated on benefits of aquatic therapy and working in pain free ranges. Objective:      [address]  Services Exercise Log  Aquatic, Hip & DLS Program- Phase 1    Date of Eval:                                Primary PT:  Diagnosis: Things to Focus On (goals):   Surgical Precautions:  Medical Precautions:  [] C-9 dates  [] Occ Med   [] Medicare       Date 22      Visit # 2/12 3/12      Walk F/L/R 2 Laps 2 Laps +R      Marching 10x 12x      Squats 10x5\" 12x5\"      Step-Ups F/L        Step Down F/L        Heel-toe raises 10x 12x      SLR F/L/R 10x 12x      Hip/Knee Flex/Ext 10x 12x      F/L Lunges                Kickboard Ex.  Lg Lg      Iso Abd. 10x5\" 10x5\"      Push-pull 10x 12x      Paddling  10x              UE Format:        Horiz Abd/Add        IR/ER (wipers)        Alt Flex/Ext        Alt Press Down        Abd/Add                Deep Water: 1 Noodle 1 Noodle      Hang 5' 5'      Cycling  2'      Jacks        X-Country                Balance        SLS                Stretches        Achllies 2x20\" 2x20\"      Hamstring 3x20\" 2x20\"              Cool Down 2 Laps 2 Laps      Pain Rating 5 4          Specific Instructions for next treatment:  Add step ups and progress deep water exercises next visit. Assessment: [x] Progressing toward goals. Continued with emphasis on core stability and postural awareness throughout treatment. Good tolerance to all exercises with no increased pain complaints but notes retro kicks and ambulation were \"more challenging. \"  Added retro ambulation, retro SLR, KB paddling and deep water cycling all for hip/core stabilization and strengthening. Good Technique with all exercises performed. Performed deep water hang for pain relief and unloading of spine. Denies any pain while hanging in deep water. [] No change. [] Other:    [] Patient would continue to benefit from skilled physical therapy services in order to: Address TRUNK ROM, ABNORMAL POSTURE, POOR TOLERANCE TO STANDING AND WALKING AND WORK TOWARD THE STATED GOALS    STG: (to be met in 8-12 treatments)  1. ? Pain: NO GREATER THAN 4/10 AT NIGHT FOR IMPROVED REST  2. ? ROM: 50% ALL PLANES  3. ? Strength:  4. ? Function: IMPROVE OSWESTRY (27) BY 7  5. Independent with Home Exercise Programs  6. \  LTG: (to be met in TBD treatments)  1.    2.    Patient goals: DECREASE PAIN    Pt. Education:  [x] Yes  [] No  [x] Reviewed Prior HEP/Ed  Method of Education: [x] Verbal  [x] Demo  [] Written  Comprehension of Education:  [x] Verbalizes understanding. [x] Demonstrates understanding. [] Needs review. [] Demonstrates/verbalizes HEP/Ed previously given.      Plan: [x] Continue per plan of care.    [] Other:      Treatment Charges: Mins Units   []  Modalities     []  Ther Exercise     []  Manual Therapy     []  Ther Activities     [x]  Aquatics 35 2   []  Neuromuscular     [] Vasocompression     [] Gait Training     [] Dry needling        [] 1 or 2 muscles        [] 3 or more muscles     []  Other     Total Treatment time 35 2     Time In:  0805          Time Out: 3210    Electronically signed by:  Casey Oliveira PTA

## 2022-06-23 ENCOUNTER — OFFICE VISIT (OUTPATIENT)
Dept: INTERNAL MEDICINE CLINIC | Age: 77
End: 2022-06-23
Payer: MEDICARE

## 2022-06-23 ENCOUNTER — HOSPITAL ENCOUNTER (OUTPATIENT)
Dept: PHYSICAL THERAPY | Age: 77
Setting detail: THERAPIES SERIES
Discharge: HOME | End: 2022-06-23
Payer: MEDICARE

## 2022-06-23 VITALS — BODY MASS INDEX: 33.29 KG/M2 | WEIGHT: 232 LBS | DIASTOLIC BLOOD PRESSURE: 84 MMHG | SYSTOLIC BLOOD PRESSURE: 136 MMHG

## 2022-06-23 DIAGNOSIS — R53.83 MALAISE AND FATIGUE: ICD-10-CM

## 2022-06-23 DIAGNOSIS — R53.81 MALAISE AND FATIGUE: ICD-10-CM

## 2022-06-23 DIAGNOSIS — E78.2 MIXED DYSLIPIDEMIA: ICD-10-CM

## 2022-06-23 DIAGNOSIS — M48.061 SPINAL STENOSIS OF LUMBAR REGION AT MULTIPLE LEVELS: Primary | ICD-10-CM

## 2022-06-23 DIAGNOSIS — E55.9 VITAMIN D DEFICIENCY: ICD-10-CM

## 2022-06-23 DIAGNOSIS — I10 ESSENTIAL HYPERTENSION: ICD-10-CM

## 2022-06-23 PROCEDURE — 97113 AQUATIC THERAPY/EXERCISES: CPT

## 2022-06-23 PROCEDURE — 1123F ACP DISCUSS/DSCN MKR DOCD: CPT | Performed by: INTERNAL MEDICINE

## 2022-06-23 PROCEDURE — 99214 OFFICE O/P EST MOD 30 MIN: CPT | Performed by: INTERNAL MEDICINE

## 2022-06-23 ASSESSMENT — ENCOUNTER SYMPTOMS
ABDOMINAL DISTENTION: 0
BACK PAIN: 1
WHEEZING: 0
EYE DISCHARGE: 0
COLOR CHANGE: 0
BLOOD IN STOOL: 0
COUGH: 0
EYE PAIN: 0
DIARRHEA: 0
TROUBLE SWALLOWING: 0
SHORTNESS OF BREATH: 0

## 2022-06-23 NOTE — FLOWSHEET NOTE
[address] [place] [address] Outpatient Physical Therapy   [address] [facility] [address]   Phone: [phone]   Fax: [phone]    Physical Therapy Daily Treatment Note      Date:  2022  Patient Name:  Diego Waldrop    :  [birth year]  MRN: [id number]  Physician: Poly Elias MD  Insurance: [employer] MEDICARE, $15 PER VISIT BASED ON MEDICAL NECESSITY PER APPOINTMENT NOTE  Medical Diagnosis:   M54.40, G89.29 (ICD-10-CM) - Chronic midline low back pain with sciatica, sciatica laterality unspecified             Rehab Codes: M25.60 -BACK STIFFNESS, M54.50 -LOW BACK PAIN, R26.2 -DIFF WALKING, R29.3 - ABNORMAL POSTURE  Onset Date: ONGOING                     Next 's appt. : 2022  Visit# / total visits: 3/12  Cancels/No Shows: 0/0    Subjective:  Pt reports no issues after last therapy session. States pain is a little higher today due to sleeping wrong. Reports he did not ride his bike this morning due to 6 consecutive days of riding- today was a rest day. Pain:  [x] Yes  [] No Location: Lumbar back L>R   Pain Rating: (0-10 scale) 5/10  Pain altered Tx:  [x] No  [] Yes  Action:  Comments:  reviewed postural awareness and core stability. Also educated on benefits of aquatic therapy and working in pain free ranges. Objective:      [address]  Services Exercise Log  Aquatic, Hip & DLS Program- Phase 1    Date of Eval:                                Primary PT:  Diagnosis: Things to Focus On (goals):   Surgical Precautions:  Medical Precautions:  [] C-9 dates  [] Occ Med   [] Medicare       Date 22     Visit # 2/12 3/12 4/12     Walk F/L/R 2 Laps 2 Laps +R 2 Laps     Marching 10x 12x 2 Laps     Squats 10x5\" 12x5\" 15x5\"     Step-Ups F/L   Low 10x     Step Down F/L        Heel-toe raises 10x 12x 15x     SLR F/L/R 10x 12x 15x     Hip/Knee Flex/Ext 10x 12x 15x     F/L Lunges                Kickboard Ex.  Lg Lg Lg     Iso Abd. 10x5\" 10x5\" 10x5\"     Push-pull 10x 12x 12x     Paddling  10x 12x             UE Format:        Horiz Abd/Add        IR/ER (wipers)        Alt Flex/Ext        Alt Press Down        Abd/Add                Deep Water: 1 Noodle 1 Noodle 1 Noodle     Hang 5' 5' 5'     Cycling  2' 2'     Jacks        X-Country                Balance        SLS                Stretches        Achllies 2x20\" 2x20\" 2x20\"     Hamstring 3x20\" 2x20\" 2x20\"             Cool Down 2 Laps 2 Laps 2 Laps     Pain Rating 5 4 5       Specific Instructions for next treatment:  Progress deep water aerobic exercises next visit. Assessment: [x] Progressing toward goals. Continued with emphasis on core stability and postural awareness throughout program.  Also reeducated on working in pain free ranges. Added marching laps for dynamic core stability and balance, step ups to low box for LE strengthening and progressed reps with most exercises. Notes some increased discomfort in right hip throughout treatment even with cueing to avoid increased pain. Ended with deep water hang for pain relief and unloading of spine. [] No change. [] Other:    [x] Patient would continue to benefit from skilled physical therapy services in order to: Address TRUNK ROM, ABNORMAL POSTURE, POOR TOLERANCE TO STANDING AND WALKING AND WORK TOWARD THE STATED GOALS    STG: (to be met in 8-12 treatments)  1. ? Pain: NO GREATER THAN 4/10 AT NIGHT FOR IMPROVED REST  2. ? ROM: 50% ALL PLANES  3. ? Strength:  4. ? Function: IMPROVE OSWESTRY (27) BY 7  5. Independent with Home Exercise Programs  6. \  LTG: (to be met in TBD treatments)  1.    2.    Patient goals: DECREASE PAIN    Pt. Education:  [x] Yes  [] No  [x] Reviewed Prior HEP/Ed  Method of Education: [x] Verbal  [x] Demo  [] Written  Comprehension of Education:  [x] Verbalizes understanding. [x] Demonstrates understanding. [] Needs review. [] Demonstrates/verbalizes HEP/Ed previously given. Plan: [x] Continue per plan of care.    [] Other:      Treatment Charges: Mins Units   []  Modalities     []  Ther Exercise     []  Manual Therapy     []  Ther Activities     [x]  Aquatics 40 3   []  Neuromuscular     [] Vasocompression     [] Gait Training     [] Dry needling        [] 1 or 2 muscles        [] 3 or more muscles     []  Other     Total Treatment time 40 3     Time In:  4260          Time Out: 0132    Electronically signed by:  Bailee Ramirez PTA

## 2022-06-23 NOTE — PROGRESS NOTES
Visit Information    Have you changed or started any medications since your last visit including any over-the-counter medicines, vitamins, or herbal medicines? no   Are you having any side effects from any of your medications? -  no  Have you stopped taking any of your medications? Is so, why? -  no    Have you seen any other physician or provider since your last visit? No  Have you had any other diagnostic tests since your last visit? Yes - Records Obtained  Have you been seen in the emergency room and/or had an admission to a hospital since we last saw you? No  Have you had your routine dental cleaning in the past 6 months? no    Have you activated your [email or web address] account? If not, what are your barriers?  Yes     Patient Care Team:  Genevieve Hoffman MD as PCP - General (Internal Medicine)  Genevieve Hoffman MD as PCP - [facility]    Medical History Review  Past Medical, Family, and Social History reviewed and does not contribute to the patient presenting condition    Health Maintenance   Topic Date Due    Lipids  11/18/2022    Depression Screen  01/18/2023    Annual Wellness Visit (AWV)  01/19/2023    DTaP/Tdap/Td vaccine (2 - Td or Tdap) 11/18/2031    Flu vaccine  Completed    Shingles vaccine  Completed    Pneumococcal 65+ years Vaccine  Completed    COVID-19 Vaccine  Completed    Hepatitis C screen  Completed    Hepatitis A vaccine  Aged Out    Hepatitis B vaccine  Aged Out    Hib vaccine  Aged Out    Meningococcal (ACWY) vaccine  Aged Out

## 2022-06-27 ENCOUNTER — HOSPITAL ENCOUNTER (OUTPATIENT)
Dept: PHYSICAL THERAPY | Age: 77
Setting detail: THERAPIES SERIES
Discharge: HOME | End: 2022-06-27
Payer: MEDICARE

## 2022-06-27 PROCEDURE — 97113 AQUATIC THERAPY/EXERCISES: CPT

## 2022-06-27 NOTE — FLOWSHEET NOTE
[address] [place] [address] Outpatient Physical Therapy   [address] [facility] [address]   Phone: [phone]   Fax: [phone]     Physical Therapy Daily Treatment Note      Date:  2022  Patient Name:  Irma Denise    :  [birth year]  MRN: [id number]  Physician: Kat Girard MD   Insurance: [employer] MEDICARE, $15 PER VISIT BASED ON MEDICAL NECESSITY PER APPOINTMENT NOTE  Medical Diagnosis:   M54.40, G89.29 (ICD-10-CM) - Chronic midline low back pain with sciatica, sciatica laterality unspecified             Rehab Codes: M25.60 -BACK STIFFNESS, M54.50 -LOW BACK PAIN, R26.2 -DIFF WALKING, R29.3 - ABNORMAL POSTURE  Onset Date: ONGOING                     Next 's appt. : 2022  Visit# / total visits:   Cancels/No Shows: 0/0    Subjective:  Pt reports no significant changes in LB pain. Pain:  [x] Yes  [] No Location: Lumbar back L>R   Pain Rating: (0-10 scale) 5/10  Pain altered Tx:  [x] No  [] Yes  Action:  Comments:  reviewed postural awareness and core stability. Also educated on benefits of aquatic therapy and working in pain free ranges. Objective:      [address]  Services Exercise Log  Aquatic, Hip & DLS Program- Phase 1    Date of Eval:                                Primary PT:  Diagnosis: Things to Focus On (goals):   Surgical Precautions:  Medical Precautions:  [] C-9 dates  [] Occ Med   [] Medicare       Date 22    Visit # 2/12 3/12 4/12 5/12    Walk F/L/R 2 Laps 2 Laps +R 2 Laps 2 Laps     Marching 10x 12x 2 Laps 2 Laps     Squats 10x5\" 12x5\" 15x5\" 15x5\"    Step-Ups F/L   Low 10x Low 20x     Step Down F/L        Heel-toe raises 10x 12x 15x 15x    SLR F/L/R 10x 12x 15x 20x     Hip/Knee Flex/Ext 10x 12x 15x 20x    F/L Lunges                Kickboard Ex.  Lg Lg Lg Lg    Iso Abd. 10x5\" 10x5\" 10x5\" 10x5\"    Push-pull 10x 12x 12x 15x    Paddling  10x 12x 12x            UE Format:        Horiz Abd/Add        IR/ER (wipers)        Alt Flex/Ext        Alt Press Down        Abd/Add                Deep Water: 1 Noodle 1 Noodle 1 Noodle 1 Noodle    Hang 5' 5' 5' 5'    Cycling  2' 2' 2'    Jacks    15x    X-Country    15x            Balance        SLS                Stretches        Achllies 2x20\" 2x20\" 2x20\" 2x20\"    Hamstring 3x20\" 2x20\" 2x20\" 2x20\"            Cool Down 2 Laps 2 Laps 2 Laps 2 Laps    Pain Rating 5 4 5 5      Specific Instructions for next treatment:  Progress deep water aerobic exercises next visit. Assessment: [x] Progressing toward goals. Able to increase reps for step ups, SLR, and hip/knee flex/ext to progress LE strength with good tolerance. Implemented jacks and x-country to progress deep water exercises with no increase of pain noted. Pt reports significant increase in tightness in the R LE as compaired to the L LE during HS and gastroc stretch. VC's continue to be require for emphasis on cores stability and postural awareness throughout session. Ended with deep water hang for pain relief and unloading of spine. Pt denies any changes in pain levels post session. [] No change. [] Other:    [x] Patient would continue to benefit from skilled physical therapy services in order to: Address TRUNK ROM, ABNORMAL POSTURE, POOR TOLERANCE TO STANDING AND WALKING AND WORK TOWARD THE STATED GOALS    STG: (to be met in 8-12 treatments)  1. ? Pain: NO GREATER THAN 4/10 AT NIGHT FOR IMPROVED REST  2. ? ROM: 50% ALL PLANES  3. ? Strength:  4. ? Function: IMPROVE OSWESTRY (27) BY 7  5. Independent with Home Exercise Programs  6. \  LTG: (to be met in TBD treatments)  1.    2.    Patient goals: DECREASE PAIN    Pt. Education:  [x] Yes  [] No  [x] Reviewed Prior HEP/Ed  Method of Education: [x] Verbal  [x] Demo  [] Written  Comprehension of Education:  [x] Verbalizes understanding. [x] Demonstrates understanding. [] Needs review. [] Demonstrates/verbalizes HEP/Ed previously given. Plan: [x] Continue per plan of care.    [] Other:       Treatment Charges: Mins Units   []  Modalities     []  Ther Exercise     []  Manual Therapy     []  Ther Activities     [x]  Aquatics 45 3   []  Neuromuscular     [] Vasocompression     [] Gait Training     [] Dry needling        [] 1 or 2 muscles        [] 3 or more muscles     []  Other     Total Treatment time 45 3     Time In:  08:05         Time Out: 08:50    Electronically signed by:  Erica Bhandari PTA

## 2022-06-30 ENCOUNTER — HOSPITAL ENCOUNTER (OUTPATIENT)
Dept: PHYSICAL THERAPY | Age: 77
Setting detail: THERAPIES SERIES
Discharge: HOME | End: 2022-06-30
Payer: MEDICARE

## 2022-06-30 PROCEDURE — 97113 AQUATIC THERAPY/EXERCISES: CPT

## 2022-06-30 NOTE — FLOWSHEET NOTE
[address] [place] [address] Outpatient Physical Therapy   [address] [place] [address] [address]   Phone: [phone]   Fax: [phone]     Physical Therapy Daily Treatment Note    Date:  2022  Patient Name:  Yumiko Bang    :    MRN: [id number]  Physician: Mary Gamboa MD   Insurance: [employer] MEDICARE, $15 PER VISIT BASED ON MEDICAL NECESSITY PER APPOINTMENT NOTE  Medical Diagnosis:   M54.40, G89.29 (ICD-10-CM) - Chronic midline low back pain with sciatica, sciatica laterality unspecified             Rehab Codes: M25.60 -BACK STIFFNESS, M54.50 -LOW BACK PAIN, R26.2 -DIFF WALKING, R29.3 - ABNORMAL POSTURE  Onset Date: ONGOING                     Next 's appt. : 2022  Visit# / total visits:   Cancels/No Shows: 0/0    Subjective:  Pt reports increased soreness and pain after last therapy session. States felt good working harder in therapy last visit but notes sore the rest of the day. Continues to report more pain upon upon waking in the morning. States he gets on the bike and stretch to loosen up and bring pain back down to 5/10 in lumbar back. Pain:  [x] Yes  [] No Location: Lumbar back L>R   Pain Rating: (0-10 scale) 5/10, 7/10 when initially waking. Pain altered Tx:  [x] No  [] Yes  Action:  Comments:  reviewed postural awareness and core stability. Also educated on benefits of aquatic therapy and working in pain free ranges. Objective:      [address]  Services Exercise Log  Aquatic, Hip & DLS Program- Phase 1    Date of Eval:                                Primary PT:  Diagnosis:   Things to Focus On (goals):   Surgical Precautions:  Medical Precautions:  [] C-9 dates  [] Occ Med   [] Medicare       Date 22   Visit # 2/12 3/12 4/12 5/12 6/12   Walk F/L/R 2 Laps 2 Laps +R 2 Laps 2 Laps  2 Laps   Marching 10x 12x 2 Laps 2 Laps  2 Laps   Squats 10x5\" 12x5\" 15x5\" 15x5\" 15x5\"   Step-Ups F/L   Low 10x Low 20x  Tall 10x Step Down F/L        Heel-toe raises 10x 12x 15x 15x 15x   SLR F/L/R 10x 12x 15x 20x  20x   Hip/Knee Flex/Ext 10x 12x 15x 20x 20x   F/L Lunges                Kickboard Ex. Lg Lg Lg Lg Lg   Iso Abd. 10x5\" 10x5\" 10x5\" 10x5\" 10x5\"   Push-pull 10x 12x 12x 15x 15x   Paddling  10x 12x 12x 15x           UE Format:        Horiz Abd/Add        IR/ER (wipers)        Alt Flex/Ext        Alt Press Down        Abd/Add                Deep Water: 1 Noodle 1 Noodle 1 Noodle 1 Noodle 1 Noodle   Hang 5' 5' 5' 5' 5'   Cycling  2' 2' 2' 2'   Jacks    15x 1'   X-Country    15x 1'           Balance        SLS                Stretches        Achllies 2x20\" 2x20\" 2x20\" 2x20\" 2x20\"   Hamstring 3x20\" 2x20\" 2x20\" 2x20\" 2x20\"           Cool Down 2 Laps 2 Laps 2 Laps 2 Laps 2 Laps   Pain Rating 5 4 5 5 5     Specific Instructions for next treatment:  Add UE format next visit. Assessment: [x] Progressing toward goals. Good overall tolerance with minimal cueing for postural awareness throughout treatment. Progress step up box height for added strengthening on LEs. Also progress deep water jacks and x-country to timed for added endurance and hip/core strengthening. Notes some difficulty with retro SLR but able to decrease height to pain-free ranges. Ended with deep water hang for pain relief and unloading of spine. [] No change. [] Other:    [x] Patient would continue to benefit from skilled physical therapy services in order to: Address TRUNK ROM, ABNORMAL POSTURE, POOR TOLERANCE TO STANDING AND WALKING AND WORK TOWARD THE STATED GOALS    STG: (to be met in 8-12 treatments)  1. ? Pain: NO GREATER THAN 4/10 AT NIGHT FOR IMPROVED REST  2. ? ROM: 50% ALL PLANES  3. ? Strength:  4. ? Function: IMPROVE OSWESTRY (27) BY 7  5. Independent with Home Exercise Programs  6. \  LTG: (to be met in TBD treatments)  1.    2.    Patient goals: DECREASE PAIN    Pt.  Education:  [x] Yes  [] No  [x] Reviewed Prior HEP/Ed  Method of Education: [x] Verbal  [x] Demo  [] Written  Comprehension of Education:  [x] Verbalizes understanding. [x] Demonstrates understanding. [] Needs review. [] Demonstrates/verbalizes HEP/Ed previously given. Plan: [x] Continue per plan of care.    [] Other:       Treatment Charges: Mins Units   []  Modalities     []  Ther Exercise     []  Manual Therapy     []  Ther Activities     [x]  Aquatics 39 3   []  Neuromuscular     [] Vasocompression     [] Gait Training     [] Dry needling        [] 1 or 2 muscles        [] 3 or more muscles     []  Other     Total Treatment time 39 3     Time In: 9969          Time Out: 0848    Electronically signed by:  Bobby Ferris PTA

## 2022-07-05 ENCOUNTER — HOSPITAL ENCOUNTER (OUTPATIENT)
Dept: PHYSICAL THERAPY | Age: 77
Setting detail: THERAPIES SERIES
Discharge: HOME | End: 2022-07-05
Payer: MEDICARE

## 2022-07-05 PROCEDURE — 97113 AQUATIC THERAPY/EXERCISES: CPT

## 2022-07-05 RX ORDER — CARVEDILOL 12.5 MG/1
TABLET ORAL
Qty: 180 TABLET | Refills: 0 | Status: SHIPPED | OUTPATIENT
Start: 2022-07-05 | End: 2022-10-25 | Stop reason: SDUPTHER

## 2022-07-05 NOTE — FLOWSHEET NOTE
[facility] Outpatient Physical Therapy   [address] [facility] [address]   Phone: [phone]   Fax: [phone]     Physical Therapy Daily Treatment Note    Date:  2022  Patient Name:  Betty Roque    :    MRN: [id number]  Physician: Valentine Silva MD   Insurance: [facility] MEDICARE, $15 PER VISIT BASED ON MEDICAL NECESSITY PER APPOINTMENT NOTE  Medical Diagnosis:   M54.40, G89.29 (ICD-10-CM) - Chronic midline low back pain with sciatica, sciatica laterality unspecified             Rehab Codes: M25.60 -BACK STIFFNESS, M54.50 -LOW BACK PAIN, R26.2 -DIFF WALKING, R29.3 - ABNORMAL POSTURE  Onset Date: ONGOING                     Next 's appt. : 2022  Visit# / total visits:   Cancels/No Shows: 0/0    Subjective:  Pt reports increased fatigue after last visit but no increased pain. States always feels very heavy when exiting the pool. Continued with difficulty getting moving in the morning. Pain:  [x] Yes  [] No Location: Lumbar back L>R   Pain Rating: (0-10 scale) 5/10, 7/10 when initially waking. Pain altered Tx:  [x] No  [] Yes  Action:  Comments:  reviewed postural awareness and core stability. Also educated on benefits of aquatic therapy and working in pain free ranges. Objective:      [address]  Services Exercise Log  Aquatic, Hip & DLS Program- Phase 1    Date of Eval:                                Primary PT:  Diagnosis: Things to Focus On (goals):   Surgical Precautions:  Medical Precautions:  [] C-9 dates  [] Occ Med   [] Medicare       Date 22   Visit #    Walk F/L/R 2 Laps 2 Laps  2 Laps 2 Laps   Marching 2 Laps 2 Laps  2 Laps 2 Laps   Squats 15x5\" 15x5\" 15x5\" 15x5\"   Step-Ups F/L Low 10x Low 20x  Tall 10x Tall 15x   Step Down F/L       Heel-toe raises 15x 15x 15x 15x   SLR F/L/R 15x 20x  20x 15x   Hip/Knee Flex/Ext 15x 20x 20x 15x   F/L Lunges              Kickboard Ex.  Lg Lg Lg Lg   Iso Abd. 10x5\" 10x5\" 10x5\" 10x5\"   Push-pull 12x 15x 15x 15x   Paddling 12x 12x 15x 15x          UE Format:       Horiz Abd/Add    10x   IR/ER (wipers)       Alt Flex/Ext    10x   Alt Press Down       Abd/Add    10x          Deep Water: 1 Noodle 1 Noodle 1 Noodle 1 Noodle   Hang 5' 5' 5' 5'   Cycling 2' 2' 2' 2'   Jacks  15x 1' 2'   X-Country  15x 1' 2'          Balance       SLS              Stretches       Achllies 2x20\" 2x20\" 2x20\" 2x20\"   Hamstring 2x20\" 2x20\" 2x20\" 2x20\"          Cool Down 2 Laps 2 Laps 2 Laps 2 Laps   Pain Rating 5 5 5 5     Specific Instructions for next treatment:  Progress UE format and add lunges next visit    Assessment: [x] Progressing toward goals. Continued with emphasis on core stability and postural awareness throughout session. Better posture throughout treatment without cueing. Also no UE support needed for most exercises performed today. Added UE format to challenge trunk stability. Also increased deep water jacks and cross-country time for added trunk/hip stability and endurance. Denies any increased pain during treatment. Notes heaviness in LEs when exiting pool. [] No change. [] Other:    [x] Patient would continue to benefit from skilled physical therapy services in order to: Address TRUNK ROM, ABNORMAL POSTURE, POOR TOLERANCE TO STANDING AND WALKING AND WORK TOWARD THE STATED GOALS    STG: (to be met in 8-12 treatments)  1. ? Pain: NO GREATER THAN 4/10 AT NIGHT FOR IMPROVED REST  2. ? ROM: 50% ALL PLANES  3. ? Strength:  4. ? Function: IMPROVE OSWESTRY (27) BY 7  5. Independent with Home Exercise Programs  6. \  LTG: (to be met in TBD treatments)  1.    2.    Patient goals: DECREASE PAIN    Pt. Education:  [x] Yes  [] No  [x] Reviewed Prior HEP/Ed  Method of Education: [x] Verbal  [x] Demo  [] Written  Comprehension of Education:  [x] Verbalizes understanding. [x] Demonstrates understanding. [] Needs review.   [] Demonstrates/verbalizes HEP/Ed previously given. Plan: [x] Continue per plan of care.    [] Other:       Treatment Charges: Mins Units   []  Modalities     []  Ther Exercise     []  Manual Therapy     []  Ther Activities     [x]  Aquatics 50 3   []  Neuromuscular     [] Vasocompression     [] Gait Training     [] Dry needling        [] 1 or 2 muscles        [] 3 or more muscles     []  Other     Total Treatment time 50 3     Time In: 0800          Time Out: 1240    Electronically signed by:  Bobby Ferris PTA

## 2022-07-07 ENCOUNTER — HOSPITAL ENCOUNTER (OUTPATIENT)
Dept: PHYSICAL THERAPY | Age: 77
Setting detail: THERAPIES SERIES
Discharge: HOME | End: 2022-07-07
Payer: MEDICARE

## 2022-07-07 PROCEDURE — 97113 AQUATIC THERAPY/EXERCISES: CPT

## 2022-07-07 NOTE — FLOWSHEET NOTE
[facility] Outpatient Physical Therapy   [address] [place] [address] [address]   Phone: [phone]   Fax: [phone]     Physical Therapy Daily Treatment Note    Date:  2022  Patient Name:  Narcisa Layne    :  [birth year]  MRN: [id number]  Physician: Ange Jauregui MD   Insurance: T MEDICARE, $15 PER VISIT BASED ON MEDICAL NECESSITY PER APPOINTMENT NOTE  Medical Diagnosis:   M54.40, G89.29 (ICD-10-CM) - Chronic midline low back pain with sciatica, sciatica laterality unspecified             Rehab Codes: M25.60 -BACK STIFFNESS, M54.50 -LOW BACK PAIN, R26.2 -DIFF WALKING, R29.3 - ABNORMAL POSTURE  Onset Date: ONGOING                     Next 's appt. : 2022  Visit# / total visits:   Cancels/No Shows: 0/0    Subjective:  Pt reports increased pain this morning due to having to perform more ADLs to help his wife yesterday. States he is having a difficult time standing straight with good posture this morning. Pain:  [x] Yes  [] No Location: Lumbar back L>R   Pain Rating: (0-10 scale) 5/10, 7/10 when initially waking. Pain altered Tx:  [x] No  [] Yes  Action:  Comments:  reviewed postural awareness and core stability. Also educated on benefits of aquatic therapy and working in pain free ranges. Objective:      [address] Services Exercise Log  Aquatic, Hip & DLS Program- Phase 1    Date of Eval:                                Primary PT:  Diagnosis:   Things to Focus On (goals):   Surgical Precautions:  Medical Precautions:  [] C-9 dates  [] Occ Med   [] Medicare       Date 22   Visit #    Walk F/L/R 2 Laps 2 Laps  2 Laps 2 Laps 2 Laps   Marching 2 Laps 2 Laps  2 Laps 2 Laps 2 Laps   Squats 15x5\" 15x5\" 15x5\" 15x5\" 15x5\"   Step-Ups F/L Low 10x Low 20x  Tall 10x Tall 15x Tall 15x   Step Down F/L        Heel-toe raises 15x 15x 15x 15x 15x   SLR F/L/R 15x 20x  20x 15x 15x   Hip/Knee Flex/Ext 15x 20x 20x 15x 15x   F/L Lunges                Kickboard Ex. Lg Lg Lg Lg Lg   Iso Abd. 10x5\" 10x5\" 10x5\" 10x5\" 10x5\"   Push-pull 12x 15x 15x 15x 15x   Paddling 12x 12x 15x 15x 15x           UE Format:        Horiz Abd/Add    10x 10x   IR/ER (wipers)     10x   Alt Flex/Ext    10x 10x   Alt Press Down     10x   Abd/Add    10x 10x           Deep Water: 1 Noodle 1 Noodle 1 Noodle 1 Noodle 1 Noodle   Hang 5' 5' 5' 5' 5'   Cycling 2' 2' 2' 2' 2'   Jacks  15x 1' 2' 2'   X-Country  15x 1' 2' 2'           Balance        SLS                Stretches        Achllies 2x20\" 2x20\" 2x20\" 2x20\" 2x20\"   Hamstring 2x20\" 2x20\" 2x20\" 2x20\" 2x20\"           Cool Down 2 Laps 2 Laps 2 Laps 2 Laps 2 Laps   Pain Rating 5 5 5 5 7     Specific Instructions for next treatment:  add lunges next visit    Assessment: [x] Progressing toward goals. Added UE format to challenge core stabilization with good tolerance and no pain complaints. Continued with emphasis on core stabilization and postural awareness with improvement in posture as treatment progressed. Ended with deep water hang for pain relief and unloading of spine. Decreased pain, tightness and improved postuee reported at end of treatment. [] No change. [] Other:    [x] Patient would continue to benefit from skilled physical therapy services in order to: Address TRUNK ROM, ABNORMAL POSTURE, POOR TOLERANCE TO STANDING AND WALKING AND WORK TOWARD THE STATED GOALS    STG: (to be met in 8-12 treatments)  1. ? Pain: NO GREATER THAN 4/10 AT NIGHT FOR IMPROVED REST  2. ? ROM: 50% ALL PLANES  3. ? Strength:  4. ? Function: IMPROVE OSWESTRY (27) BY 7  5. Independent with Home Exercise Programs  6. \  LTG: (to be met in TBD treatments)  1.    2.    Patient goals: DECREASE PAIN    Pt. Education:  [x] Yes  [] No  [x] Reviewed Prior HEP/Ed  Method of Education: [x] Verbal  [x] Demo  [] Written  Comprehension of Education:  [x] Verbalizes understanding. [x] Demonstrates understanding.   [] Needs review. [] Demonstrates/verbalizes HEP/Ed previously given. Plan: [x] Continue per plan of care.    [] Other:       Treatment Charges: Mins Units   []  Modalities     []  Ther Exercise     []  Manual Therapy     []  Ther Activities     [x]  Aquatics 43 3   []  Neuromuscular     [] Vasocompression     [] Gait Training     [] Dry needling        [] 1 or 2 muscles        [] 3 or more muscles     []  Other     Total Treatment time 43 3     Time In: 0802          Time Out: 1119    Electronically signed by:  Kimani Leahy PTA

## 2022-07-11 ENCOUNTER — HOSPITAL ENCOUNTER (OUTPATIENT)
Dept: PHYSICAL THERAPY | Age: 77
Setting detail: THERAPIES SERIES
Discharge: HOME | End: 2022-07-11
Payer: MEDICARE

## 2022-07-11 PROCEDURE — 97113 AQUATIC THERAPY/EXERCISES: CPT

## 2022-07-11 PROCEDURE — 97110 THERAPEUTIC EXERCISES: CPT

## 2022-07-11 NOTE — PROGRESS NOTES
[facility] Outpatient Physical Therapy  [address]         Phone: [phone]       Fax: [phone]    Physical Therapy Progress Note    Date: 2022      Patient: Jasson Arce  : 1945  MRN: [id number]    Archana Olguin MD   Insurance: [facility] MEDICARE, $15 PER VISIT BASED ON MEDICAL NECESSITY PER APPOINTMENT NOTE  Medical Diagnosis:   M54.40, G89.29 (ICD-10-CM) - Chronic midline low back pain with sciatica, sciatica laterality unspecified             Rehab Codes: M25.60 -BACK STIFFNESS, M54.50 -LOW BACK PAIN, R26.2 -DIFF WALKING, R29.3 - ABNORMAL POSTURE  Onset Date: ONGOING                     Next 's appt. : 2022  Visit# / total visits:                     Cancels/No Shows: 0/0          Subjective:  PATIENT REPORTS HE FEELS GOOD AFTER PERFORMING HIS EXERCISES IN THE POOL AND RIDING HIS STATIONARY BICYCLE. HE IS STILL IS EXPERIENCING BACK PAIN AND LEFT BUTTOCKS PAIN BUT FEELS IT IS SLIGHTLY IMPROVED. HE IS NOW ABLE TO GET ADEQUATE REST AT NIGHT WITH LESS INTERRUPTIONS DUE TO BACK PAIN. HE STATES HE IS NOW ABLE TO STAND AND WALK ABOUT ONE HOUR AND THEN MUST SIT DUE TO BACK PAIN. THIS IS IMPROVED FROM 5-10 MINUTES. 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 HE HAS INCREASED PAIN TODAY DUE TO BEING VERY ACTIVE OVER THE WEEKEND -CHORES AT [place] [address]. PATIENT REPORTS HE GETS UP AT 4-5 IN THE MORNING  AND BY 4-5 IN THE AFTERNOON HE \"IS READY FOR THE RECLINER\" DUE TO INCREASING BACK PAIN. HE FEELS HE COULD CONTINUE TO MAKE GAIN WITH THERAPY AND IS INTERESTED IN PERFORMING SOME OF THIS THERAPY IN THE GYM.     Pain:  [x] Yes  [] No  Location: LOW BACK AND LEFT HIP Pain Rating: (0-10 scale) 7/10      Objective:  Test Measurements:    ROM   Lumbar     Flexion 50% OF NORMAL (NOW 50-75%)   Extension <25% (NOW 25%)   Rotation L 25% R 25%   Sidebend L 25% (NOW 50%) R 25% (NOW 50%)   LE AROM WFL WFL               Function: OSWESTRY BACK DISABILITY SCALE: 21/50 (IMPROVED BY 6)    Exercises: INSTRUCTION IN HEP  Exercise Reps/ Time Weight/ Level Comments   LTR 5\"X10 EA  QD HEP   SKC 5\"X10 EA  QD HEP   SUPINE HS STRETCH 20\"X3 EA  QD HEP   ABDOMINAL BRACING 5\"X10   QD HEP   SLR WITH TrA 5\"X10 EA  QD HEP     Other: PATIENT INSTRUCTION TO AVOID PAINFUL ROM WHEN PERFORMING HEP. ADVISED WALKING WITH CANE WOULD INCREASE TOLERANCE TO STANDING AND WALKING. PATIENT EDUCATION TRANSFERS SIT <>SIDELYING <>LOG ROLL <>SUPINE TO AVOID TWISTING SPINE  Assessment:      Assessment: [x] Progressing toward goals AS DOCUMENTED BELOW    [] No change. [] Other:  [x] Patient would continue to benefit from skilled physical therapy services in order to: TRUNK ROM, ABNORMAL POSTURE, POOR TOLERANCE   TO STANDING AND WALKING AND WORK TOWARD THE STATED GOALS       STG: (to be met in 8-12 treatments)  1. ? Pain: NO GREATER THAN 4/10 AT NIGHT FOR IMPROVED REST (IMPROVED NOT MET)  2. ? ROM: 50% ALL PLANES (IMPROVED NOT MET)  3. ? Strength:  4. ? Function: IMPROVE OSWESTRY (27) BY 7 (IMPROVED BY 6)  5.  Independent with Home Exercise Programs (MET)  LTG: (to be met in 20 treatments)  1.  CONTINUE TO WORK TOWARDS STG'S  2.  OSWESTRY =/<  17/50  Patient goals: DECREASE PAIN        Treatment to Date:  [x] Therapeutic Exercise   16787  [] Iontophoresis: 4 mg/mL Dexamethasone Sodium Phosphate  mAmin  68057   [] Therapeutic Activity  45229 [] Vasopneumatic cold with compression  41376    [] Gait Training   87299 [] Ultrasound   27008   [] Neuromuscular Re-education  85498 [] Electrical Stimulation Unattended  50302   [x] Manual Therapy  90509 [] Electrical Stimulation Attended  07558   [x] Instruction in HEP  [] Lumbar/Cervical Traction  22472   [x] Aquatic Therapy   71454 [] Cold/hotpack    [] Massage   35742      [] Dry Needling, 1 or 2 muscles  21808      Patient Status:     [] Continue per initial plan of care. [x] Additional visits necessary. [x] Other: PROGRESS TO ONE GYM AND ONE AQUATIC THERAPY VISIT PER WEEK     Requested Frequency/Duration: 12 times per week for 8 MORE (TOTAL 20) treatments. Treatment Charges: Mins Units   []? Modalities       [x]? Ther Exercise  10  1   []? Manual Therapy       []? Ther Activities       [x]? Aquatics 52 3   []? Neuromuscular       []? Vasocompression       []? Gait Training       []? Dry needling        []? 1 or 2 muscles        []? 3 or more muscles       []? Other       Total Treatment time 62 4      Time In: 8206          Time Out: 2376    TIME IN: 0915 TIME OUT: 955    Physical therapy treatment completed today in part by physical therapist assistant (PTA). Treatment times reflect total treatment time combined by both PT and PTA for today's service. Electronically signed by: Marissa Berry PT    If you have any questions or concerns, please don't hesitate to call. Thank you for your referral.    Physician Signature:________________________________Date:__________________  By signing above or cosigning this note, I have reviewed this plan of care and certify a need for medically necessary rehabilitation services.

## 2022-07-11 NOTE — FLOWSHEET NOTE
[address] [place] [address] Outpatient Physical Therapy   [address] [place] [address] [address]   Phone: [phone]   Fax: [phone]     Physical Therapy Daily Treatment Note    Date:  2022  Patient Name:  Whitney Dao    :    MRN: [id number]  Physician: Ellie Dillard MD   Insurance: [facility] MEDICARE, $15 PER VISIT BASED ON MEDICAL NECESSITY PER APPOINTMENT NOTE  Medical Diagnosis:   M54.40, G89.29 (ICD-10-CM) - Chronic midline low back pain with sciatica, sciatica laterality unspecified             Rehab Codes: M25.60 -BACK STIFFNESS, M54.50 -LOW BACK PAIN, R26.2 -DIFF WALKING, R29.3 - ABNORMAL POSTURE  Onset Date: ONGOING                     Next 's appt. : 2022  Visit# / total visits:   Cancels/No Shows: 0/0    Subjective:  Pt reports increased pain this morning due to being on his feet and stressed out over the weekend. Had to take his wife to the ED and was up at the hospital most of the day yesterday. States lumbar back and (B) knees hurt more this morning. Pain:  [x] Yes  [] No Location: Lumbar back L>R   Pain Rating: (0-10 scale) 8/10, 8/10 when initially waking. Pain altered Tx:  [] No  [x] Yes  Action:  Cueing to stay in pain free ranges. Comments:  reviewed postural awareness and core stability. Also educated on benefits of aquatic therapy and working in pain free ranges. Objective:      [address]  Services Exercise Log  Aquatic, Hip & DLS Program- Phase 1    Date of Eval:                                Primary PT:  Diagnosis:   Things to Focus On (goals):   Surgical Precautions:  Medical Precautions:  [] C-9 dates  [] Occ Med   [] Medicare       Date 22   Visit #    Walk F/L/R 2 Laps 2 Laps 2 Laps 2 Laps   Marching 2 Laps 2 Laps 2 Laps 2 Laps   Squats 15x5\" 15x5\" 15x5\" 15x5\"   Step-Ups F/L Tall 10x Tall 15x Tall 15x Tall 15x   Step Down F/L       Heel-toe raises 15x 15x 15x 15x   SLR F/L/R review. [] Demonstrates/verbalizes HEP/Ed previously given. Plan: [x] Continue per plan of care. [] Other:       Treatment Charges: Mins Units   []  Modalities     [x]  Ther Exercise 10    []  Manual Therapy     []  Ther Activities     [x]  Aquatics 52 3   []  Neuromuscular     [] Vasocompression     [] Gait Training     [] Dry needling        [] 1 or 2 muscles        [] 3 or more muscles     []  Other     Total Treatment time 62 4     Time In: 0713          Time Out: 2931  TIME IN: 0915          TIME OUT: 955    Physical therapy treatment completed today in part by physical therapist assistant (PTA). Treatment times reflect total treatment time combined by both PT and PTA for today's service.       Electronically signed by:  Michelle Devine PTA

## 2022-07-14 ENCOUNTER — HOSPITAL ENCOUNTER (OUTPATIENT)
Dept: PHYSICAL THERAPY | Age: 77
Setting detail: THERAPIES SERIES
Discharge: HOME | End: 2022-07-14
Payer: MEDICARE

## 2022-07-14 PROCEDURE — 97113 AQUATIC THERAPY/EXERCISES: CPT

## 2022-07-14 NOTE — FLOWSHEET NOTE
[address] [place] [address] Outpatient Physical Therapy   [address] [place], [place] [address] [facility] [address]   Phone: [phone]   Fax: [phone]     Physical Therapy Daily Treatment Note    Date:  2022  Patient Name:  Donald Fung    :  [birth year]  MRN: [id number]  Physician: Rachel Ayala MD   Insurance: [facility] MEDICARE, $15 PER VISIT BASED ON MEDICAL NECESSITY PER APPOINTMENT NOTE  Medical Diagnosis:   M54.40, G89.29 (ICD-10-CM) - Chronic midline low back pain with sciatica, sciatica laterality unspecified             Rehab Codes: M25.60 -BACK STIFFNESS, M54.50 -LOW BACK PAIN, R26.2 -DIFF WALKING, R29.3 - ABNORMAL POSTURE  Onset Date: ONGOING                     Next 's appt. : 2022  Visit# / total visits: 10/12  Cancels/No Shows: 0/0    Subjective:  Pt reports waking up 10/10 in pain but was able to perform his stationary bike and decrease his pain to 7/10. Denies any increased pain after last visit. Pain:  [x] Yes  [] No Location: Lumbar back L>R   Pain Rating: (0-10 scale) 7/10, 7/10 when initially waking. Pain altered Tx:  [] No  [x] Yes  Action:  Cueing to stay in pain free ranges. Comments:  reviewed postural awareness and core stability. Also educated on benefits of aquatic therapy and working in pain free ranges. Objective:      [address]  Services Exercise Log  Aquatic, Hip & DLS Program- Phase 1    Date of Eval:                                Primary PT:  Diagnosis:   Things to Focus On (goals):   Surgical Precautions:  Medical Precautions:  [] C-9 dates  [] Occ Med   [] Medicare       Date 22   Visit # 6/12 7/12 8/12 9/12 10/12   Walk F/L/R 2 Laps 2 Laps 2 Laps 2 Laps 2 Laps   Marching 2 Laps 2 Laps 2 Laps 2 Laps 2 Laps   Squats 15x5\" 15x5\" 15x5\" 15x5\" 15x5\"   Step-Ups F/L Tall 10x Tall 15x Tall 15x Tall 15x Tall 15x   Step Down F/L        Heel-toe raises 15x 15x 15x 15x 15x   SLR F/L/R 20x 15x 15x 15x 15x   Hip/Knee Flex/Ext 20x 15x 15x 15x 15x   F/L Lunges     Tall 10x           Kickboard Ex. Lg Lg Lg Lg Lg   Iso Abd. 10x5\" 10x5\" 10x5\" 10x5\" 10x5\"   Push-pull 15x 15x 15x 15x 15x   Paddling 15x 15x 15x 15x 15x           UE Format:        Horiz Abd/Add  10x 10x 12x 15x   IR/ER (wipers)   10x 12x 15x   Alt Flex/Ext  10x 10x 12x 15x   Alt Press Down   10x 12x 15x   Abd/Add  10x 10x 12x 15x           Deep Water: 1 Noodle 1 Noodle 1 Noodle 1 Noodle 1 Noodle   Hang 5' 5' 5' 5' 5'   Cycling 2' 2' 2' 2' 2'   Jacks 1' 2' 2' 2' 2'   X-Country 1' 2' 2' 2' 2'           Balance        SLS                Stretches        Achllies 2x20\" 2x20\" 2x20\" 2x20\" 2x20\"   Hamstring 2x20\" 2x20\" 2x20\" 2x20\" 2x20\"           Cool Down 2 Laps 2 Laps 2 Laps 2 Laps 2 Laps   Pain Rating 5 5 7 8 7     Specific Instructions for next treatment:  Progress to low box for all LE exercises next visit. Assessment: [x] Progressing toward goals. Continued with emphasis on core stability and postural awareness throughout. Good overall tolerance to all exercises performed. Increased UE format reps and speed for added resistance for core/trunk stabilization. Ended with deep water aerobic exercises with increased speed for added resistance. [] No change. [] Other:    [x] Patient would continue to benefit from skilled physical therapy services in order to: Address TRUNK ROM, ABNORMAL POSTURE, POOR TOLERANCE TO STANDING AND WALKING AND WORK TOWARD THE STATED GOALS    STG: (to be met in 8-12 treatments)  1. ? Pain: NO GREATER THAN 4/10 AT NIGHT FOR IMPROVED REST  2. ? ROM: 50% ALL PLANES  3. ? Strength:  4. ? Function: IMPROVE OSWESTRY (27) BY 7  5. Independent with Home Exercise Programs  6. \  LTG: (to be met in TBD treatments)  1.    2.    Patient goals: DECREASE PAIN    Pt. Education:  [x] Yes  [] No  [x] Reviewed Prior HEP/Ed  Method of Education: [x] Verbal  [x] Demo  [] Written  Comprehension of Education:  [x] Verbalizes understanding.   [x] Demonstrates understanding. [] Needs review. [] Demonstrates/verbalizes HEP/Ed previously given. Plan: [x] Continue per plan of care.    [] Other:       Treatment Charges: Mins Units   []  Modalities     []  Ther Exercise     []  Manual Therapy     []  Ther Activities     [x]  Aquatics 45 3   []  Neuromuscular     [] Vasocompression     [] Gait Training     [] Dry needling        [] 1 or 2 muscles        [] 3 or more muscles     []  Other     Total Treatment time 45 3     Time In: 0800           Time Out: 3154      Electronically signed by:  Michelle Devine PTA

## 2022-07-18 ENCOUNTER — HOSPITAL ENCOUNTER (OUTPATIENT)
Dept: PHYSICAL THERAPY | Age: 77
Setting detail: THERAPIES SERIES
Discharge: HOME | End: 2022-07-18
Payer: MEDICARE

## 2022-07-18 PROCEDURE — 97113 AQUATIC THERAPY/EXERCISES: CPT

## 2022-07-18 NOTE — FLOWSHEET NOTE
[facility] Outpatient Physical Therapy   [address]  [facility] [address]   Phone: [phone]   Fax: [phone]     Physical Therapy Daily Treatment Note    Date:  2022  Patient Name:  Nael Brantley    :    MRN: [id number]  Physician: Jarvis Toribio MD   Insurance: [employer] MEDICARE, $15 PER VISIT BASED ON MEDICAL NECESSITY PER APPOINTMENT NOTE  Medical Diagnosis:   M54.40, G89.29 (ICD-10-CM) - Chronic midline low back pain with sciatica, sciatica laterality unspecified             Rehab Codes: M25.60 -BACK STIFFNESS, M54.50 -LOW BACK PAIN, R26.2 -DIFF WALKING, R29.3 - ABNORMAL POSTURE  Onset Date: ONGOING                     Next 's appt. : 2022  Visit# / total visits:   Cancels/No Shows: 0/0    Subjective:  Patient reports very high pain all weekend due to performing exercises given for HEP. States feels exercises cause increased pain down (B) LE to posterior knees and 10/10 all weekend. States was not able to get out of bed Friday and Saturday. States taking Ibuprofen daily to help with the pain. Pain:  [x] Yes  [] No Location: Lumbar back L>R   Pain Rating: (0-10 scale) 8/10, 8/10 when initially waking. Pain altered Tx:  [x] No  [] Yes  Action:    Comments:  reviewed postural awareness and core stability. Also educated on benefits of aquatic therapy and working in pain free ranges. Objective:      [facility] Services Exercise Log  Aquatic, Hip & DLS Program- Phase 1    Date of Eval:                                Primary PT:  Diagnosis:   Things to Focus On (goals):   Surgical Precautions:  Medical Precautions:  [] C-9 dates  [] Occ Med   [] Medicare       Date 22   Visit # 8/12 9/12 10/12 11/12   Walk F/L/R 2 Laps 2 Laps 2 Laps 2 Laps   Marching 2 Laps 2 Laps 2 Laps 2 Laps   Squats 15x5\" 15x5\" 15x5\" Low 15x5\"   Step-Ups F/L Tall 15x Tall 15x Tall 15x Tall 15x   Step Down F/L       Heel-toe raises 15x 15x 15x 15x   SLR F/L/R 15x 15x 15x 15x   Hip/Knee Flex/Ext 15x 15x 15x 15x   F/L Lunges   Tall 10x Tall 10x          Kickboard Ex. Lg Lg Lg Lg   Iso Abd. 10x5\" 10x5\" 10x5\" 10x5\"   Push-pull 15x 15x 15x 15x   Paddling 15x 15x 15x 15x          UE Format:       Horiz Abd/Add 10x 12x 15x 15x   IR/ER (wipers) 10x 12x 15x 15x   Alt Flex/Ext 10x 12x 15x 15x   Alt Press Down 10x 12x 15x 15x   Abd/Add 10x 12x 15x 15x          Deep Water: 1 Noodle 1 Noodle 1 Noodle 1 Noodle   Hang 5' 5' 5' 5'   Cycling 2' 2' 2' 2'   Jacks 2' 2' 2' 2'   X-Country 2' 2' 2' 2'          Balance       SLS              Stretches       Achllies 2x20\" 2x20\" 2x20\" 2x20\"   Hamstring 2x20\" 2x20\" 2x20\" 2x20\"          Cool Down 2 Laps 2 Laps 2 Laps 2 Laps Breast stroke   Pain Rating 7 8 7 8     Specific Instructions for next treatment:  Small buoyancy cuffs    Assessment: [x] Progressing toward goals. Continued with emphasis on core stability and postural awareness. Progressed squats to box for increased WB and ROM when performing. Notes tightness in back and knees but denies any increased pain. Continued with UE format and deep water exercises for hip/trunk stability and endurance. [] No change. [] Other:    [x] Patient would continue to benefit from skilled physical therapy services in order to: Address TRUNK ROM, ABNORMAL POSTURE, POOR TOLERANCE TO STANDING AND WALKING AND WORK TOWARD THE STATED GOALS    STG: (to be met in 8-12 treatments)  ? Pain: NO GREATER THAN 4/10 AT NIGHT FOR IMPROVED REST (IMPROVED NOT MET)  ? ROM: 50% ALL PLANES (IMPROVED NOT MET)  ? Strength:  ? Function: IMPROVE OSWESTRY (27) BY 7 (IMPROVED BY 6)  Independent with Home Exercise Programs (MET)  LTG: (to be met in 20 treatments)   CONTINUE TO WORK TOWARDS STG'S   OSWESTRY =/<  17/50  Patient goals: DECREASE PAIN    Pt.  Education:  [x] Yes  [] No  [x] Reviewed Prior HEP/Ed  Method of Education: [x] Verbal  [x] Demo  [] Written  Comprehension of Education:  [x] Verbalizes understanding. [x] Demonstrates understanding. [] Needs review. [] Demonstrates/verbalizes HEP/Ed previously given. Plan: [x] Continue per plan of care.    [] Other:       Treatment Charges: Mins Units   []  Modalities     []  Ther Exercise     []  Manual Therapy     []  Ther Activities     [x]  Aquatics 50 3   []  Neuromuscular     [] Vasocompression     [] Gait Training     [] Dry needling        [] 1 or 2 muscles        [] 3 or more muscles     []  Other     Total Treatment time 50 3     Time In: 1018           Time Out: 0453    Electronically signed by:  Dania Velez PTA

## 2022-07-21 ENCOUNTER — HOSPITAL ENCOUNTER (OUTPATIENT)
Dept: PHYSICAL THERAPY | Age: 77
Setting detail: THERAPIES SERIES
Discharge: HOME | End: 2022-07-21
Payer: MEDICARE

## 2022-07-21 PROCEDURE — 97113 AQUATIC THERAPY/EXERCISES: CPT

## 2022-07-21 NOTE — FLOWSHEET NOTE
[address] [place] [address] Outpatient Physical Therapy   [address] [place] [address] [address]   Phone: [phone]   Fax: [phone]     Physical Therapy Daily Treatment Note    Date:  2022  Patient Name:  Betyt Roque    :    MRN: [id number]  Physician: Valentine Silva MD   Insurance: [facility] MEDICARE, $15 PER VISIT BASED ON MEDICAL NECESSITY PER APPOINTMENT NOTE  Medical Diagnosis:   M54.40, G89.29 (ICD-10-CM) - Chronic midline low back pain with sciatica, sciatica laterality unspecified             Rehab Codes: M25.60 -BACK STIFFNESS, M54.50 -LOW BACK PAIN, R26.2 -DIFF WALKING, R29.3 - ABNORMAL POSTURE  Onset Date: ONGOING                     Next 's appt. : 2022  Visit# / total visits:   Cancels/No Shows: 0/0    Subjective:  Patient reports the high pain has subsided but still having 7-8/10 pain in right hip/thigh area this morning. States he may have overdone it last week which cause the increased pain. States no issues after last therapy session. Pain:  [x] Yes  [] No Location: Lumbar back L>R   Pain Rating: (0-10 scale) 8/10, 8/10 when initially waking. Pain altered Tx:  [x] No  [] Yes  Action:    Comments:  reviewed postural awareness and core stability. Also educated on benefits of aquatic therapy and working in pain free ranges. Objective:      [address] Services Exercise Log  Aquatic, Hip & DLS Program- Phase 1    Date of Eval:                                Primary PT:  Diagnosis:   Things to Focus On (goals):   Surgical Precautions:  Medical Precautions:  [] C-9 dates  [] Occ Med   [] Medicare       Date 22   Visit # 8/12 9/12 10/12 11/12 12/20   Walk F/L/R 2 Laps 2 Laps 2 Laps 2 Laps 2 Laps   Marching 2 Laps 2 Laps 2 Laps 2 Laps 2 Laps        Low box   Squats 15x5\" 15x5\" 15x5\" Low 15x5\" 15x5\"   Step-Ups F/L Tall 15x Tall 15x Tall 15x Tall 15x Tall 15x   Step Down F/L        Heel-toe raises 15x 15x 15x 15x 15x   SLR F/L/R 15x 15x 15x 15x 15x   Hip/Knee Flex/Ext 15x 15x 15x 15x 15x   F/L Lunges   Tall 10x Tall 10x Tall 10x           Kickboard Ex. Lg Lg Lg Lg Lg   Iso Abd. 10x5\" 10x5\" 10x5\" 10x5\" 15x5\"   Push-pull 15x 15x 15x 15x 15x   Paddling 15x 15x 15x 15x 15x           UE Format:        Horiz Abd/Add 10x 12x 15x 15x 15x   IR/ER (wipers) 10x 12x 15x 15x 15x   Alt Flex/Ext 10x 12x 15x 15x 15x   Alt Press Down 10x 12x 15x 15x 15x   Abd/Add 10x 12x 15x 15x 15x           Deep Water: 1 Noodle 1 Noodle 1 Noodle 1 Noodle 1 Noodle   Hang 5' 5' 5' 5' 5'   Cycling 2' 2' 2' 2' 2'   Jacks 2' 2' 2' 2' 2'   X-Country 2' 2' 2' 2' 2'           Balance        SLS                Stretches        Achllies 2x20\" 2x20\" 2x20\" 2x20\" 2x20\"   Hamstring 2x20\" 2x20\" 2x20\" 2x20\" 2x20\"           Cool Down 2 Laps 2 Laps 2 Laps 2 Laps Breast stroke 2 Laps Breast stroke   Pain Rating 7 8 7 8 7-8     Specific Instructions for next treatment:  Small buoyancy cuffs    Assessment: [x] Progressing toward goals. Continued with core stability, postural awareness and no UE support throughout treatment. Performed all LE exs on low box for added WB during exercises to challenge core stability and strength. Good tolerance to exercises but did require 1 finger support for tall box step ups. Ended with deep water hang for pain relief and unloading of spine. [] No change. [] Other:    [x] Patient would continue to benefit from skilled physical therapy services in order to: Address TRUNK ROM, ABNORMAL POSTURE, POOR TOLERANCE TO STANDING AND WALKING AND WORK TOWARD THE STATED GOALS    STG: (to be met in 8-12 treatments)  ? Pain: NO GREATER THAN 4/10 AT NIGHT FOR IMPROVED REST (IMPROVED NOT MET)  ? ROM: 50% ALL PLANES (IMPROVED NOT MET)  ? Strength:  ?  Function: IMPROVE OSWESTRY (27) BY 7 (IMPROVED BY 6)  Independent with Home Exercise Programs (MET)  LTG: (to be met in 20 treatments)   CONTINUE TO WORK TOWARDS STG'S   OSWESTRY =/<  17/50  Patient goals: DECREASE PAIN    Pt. Education:  [x] Yes  [] No  [x] Reviewed Prior HEP/Ed  Method of Education: [x] Verbal  [x] Demo  [] Written  Comprehension of Education:  [x] Verbalizes understanding. [x] Demonstrates understanding. [] Needs review. [] Demonstrates/verbalizes HEP/Ed previously given. Plan: [x] Continue per plan of care.    [] Other:       Treatment Charges: Mins Units   []  Modalities     []  Ther Exercise     []  Manual Therapy     []  Ther Activities     [x]  Aquatics 50 3   []  Neuromuscular     [] Vasocompression     [] Gait Training     [] Dry needling        [] 1 or 2 muscles        [] 3 or more muscles     []  Other     Total Treatment time 50 3     Time In: 1265           Time Out: 6498    Electronically signed by:  Jayne Sandoval PTA

## 2022-07-25 ENCOUNTER — HOSPITAL ENCOUNTER (OUTPATIENT)
Dept: PHYSICAL THERAPY | Age: 77
Setting detail: THERAPIES SERIES
Discharge: HOME | End: 2022-07-25
Payer: MEDICARE

## 2022-07-25 PROCEDURE — 97110 THERAPEUTIC EXERCISES: CPT

## 2022-07-25 PROCEDURE — 97140 MANUAL THERAPY 1/> REGIONS: CPT

## 2022-07-25 NOTE — PROGRESS NOTES
509 [facility] Outpatient Physical Therapy   [address] [place] [address] [address]   Phone: [phone]   Fax: [phone]    Physical Therapy Daily Treatment Note      Date:  2022  Patient Name:  Shereen Cabrera    :    MRN: [id number]    Physician: Amanda Liu MD  Insurance: [employer] MEDICARE, $15 PER VISIT BASED ON MEDICAL NECESSITY PER APPOINTMENT NOTE  Medical Diagnosis:   M54.40, G89.29 (ICD-10-CM) - Chronic midline low back pain with sciatica, sciatica laterality unspecified             Rehab Codes: M25.60 -BACK STIFFNESS, M54.50 -LOW BACK PAIN, R26.2 -DIFF WALKING, R29.3 - ABNORMAL POSTURE  Onset Date: ONGOING                     Next 's appt. : 2022  Visit# / total visits:                   Cancels/No Shows: 0/0  Subjective:  DID NOT SLEEP WELL DUE TO PAIN INTERRUPTING HIS SLEEP  Pain:  [x] Yes  [] No Location: R SIDE BACK, HIP AND LATERAL THIGH Pain Rating: (0-10 scale) 8/10  Pain altered Tx:  [] No  [x] Yes  Action: MODIFIED EXERCISES      Objective:  Modalities: NONE TODAY, CONSIDER MHP AND IFES. Precautions: B TOTAL SHOULDER REPLACEMENTS  Exercises:  Exercise Reps/ Time Weight/ Level Completed  Today Comments   Nustep 5' L5 X GATHERED SUBJECTIVE DATA   Reviewed HEP   X    ABDOMINAL BRACING 5\"X10      SUPINE CLAM SHELL WITH T BAND 5\"X10 EA  X    REVERSE CLAM SHELL 10 EA  X    SIDELYING OPEN BOOK    ADD   HIP ADD ISOMETRICS 5\"X10  X BALL   MARCHING WITH T BAND 10 EA  X    DOUBLE KNEE TO CHEST 30\"X4  X THERAPIST ASSISTED DUE TO SHOULDER WEAKNESS   RESISTED HIP FLEXION   X    4 WAY SLR (THERABAND) IN STANDING  10 EA YELLOW X SAT AND RESTED WHEN CHANGING LEGS          Other: PATIENT EDUCATION REGARDING TRANSFER TO ABOVE TWISTING THE SPINE. SIT <> SIDELYING <>LOG ROLL <>SUPINE. MANUAL:HYPERVOLT TO L HIP, B BUTTOCKS, LATERAL L THIGH, LOW BACK X10' IN R SIDELYING POSITION. INTENSITY WAS LOW USING ACCORDION ATTACHMENT.   Specific Instructions for next treatment: PROGRESS GYM PROGRAM FOR CORE STRENGTHENING AND IMPROVED TRUNK ROM. LIMIT TIME IN STANDING DUE TO POOR TOLERANCE. Assessment:   PATIENT REPORTED LESS PAIN AND THE ABILITY TO WALK WITH BETTER POSTURE FOLLOWING TREATMENT. [] Progressing toward goals. [] No change. [] Other:    [x] Patient would continue to benefit from skilled physical therapy services in order to: Address TRUNK ROM, ABNORMAL POSTURE, POOR TOLERANCE TO STANDING AND WALKING AND WORK TOWARD THE STATED GOALS     STG: (to be met in 8-12 treatments)  ? Pain: NO GREATER THAN 4/10 AT NIGHT FOR IMPROVED REST (IMPROVED NOT MET)  ? ROM: 50% ALL PLANES (IMPROVED NOT MET)  ? Strength:  ? Function: IMPROVE OSWESTRY (27) BY 7 (IMPROVED BY 6)  Independent with Home Exercise Programs (MET)  LTG: (to be met in 20 treatments)   CONTINUE TO WORK TOWARDS STG'S   OSWESTRY =/<  17/50  Patient goals: DECREASE PAIN    Pt. Education:  [] Yes  [] No  [x] Reviewed Prior HEP/Ed  Exercise Reps/ Time Weight/ Level Comments   LTR 5\"X10 EA   QD HEP   SKC 5\"X10 EA   QD HEP   SUPINE HS STRETCH 20\"X3 EA   QD HEP   ABDOMINAL BRACING 5\"X10   QD HEP   SLR WITH TrA 5\"X10 EA   QD HEP     Method of Education: [x] Verbal  [x] Demo  [] Written  Comprehension of Education:  [x] Verbalizes understanding. [x] Demonstrates understanding. [] Needs review. [] Demonstrates/verbalizes HEP/Ed previously given. Plan: [x] Continue per plan of care. [] Other:      Treatment Charges: Mins Units   []  Modalities     [x]  Ther Exercise 30 2   [x]  Manual Therapy 10 1   []  Ther Activities     []  Aquatics     []  Neuromuscular     [] Vasocompression     [] Gait Training     [] Dry needling        [] 1 or 2 muscles        [] 3 or more muscles     []  Other     Total Treatment time 40 3     Time In:   0800         Time Out: 1047    Electronically signed by:   Krissy Salgado PT

## 2022-07-28 ENCOUNTER — HOSPITAL ENCOUNTER (OUTPATIENT)
Dept: PHYSICAL THERAPY | Age: 77
Setting detail: THERAPIES SERIES
Discharge: HOME | End: 2022-07-28
Payer: MEDICARE

## 2022-07-28 PROCEDURE — 97113 AQUATIC THERAPY/EXERCISES: CPT

## 2022-07-28 NOTE — FLOWSHEET NOTE
[address] [facility]   [address] [facility] [address]   Phone: [phone]   Fax: [phone]     Physical Therapy Daily Treatment Note    Date:  2022  Patient Name:  Leonardo Bailey    :  [birth year]  MRN: [id number]  Physician: Sophie Licona MD   Insurance: [employer] MEDICARE, $15 PER VISIT BASED ON MEDICAL NECESSITY PER APPOINTMENT NOTE  Medical Diagnosis:   M54.40, G89.29 (ICD-10-CM) - Chronic midline low back pain with sciatica, sciatica laterality unspecified             Rehab Codes: M25.60 -BACK STIFFNESS, M54.50 -LOW BACK PAIN, R26.2 -DIFF WALKING, R29.3 - ABNORMAL POSTURE  Onset Date: ONGOING                     Next 's appt. : 2022  Visit# / total visits:   Cancels/No Shows: 0/0    Subjective:  Patient reports some increased soreness after initial gym therapy session but no increased pain. Continued complaints of increased pain first thing in the morning and \"loosens up\" as he gets moving in the morning. States has back off riding his bike in the morning and has been performing HEP given to him instead. Pain:  [x] Yes  [] No Location: Lumbar back L>R   Pain Rating: (0-10 scale) 8/10, 8/10 when initially waking. Pain altered Tx:  [x] No  [] Yes  Action:    Comments:  reviewed postural awareness and core stability. Also educated on benefits of aquatic therapy and working in pain free ranges. Objective:      [address]  [employer] Exercise Log  Aquatic, Hip & DLS Program- Phase 1    Date of Eval:                                Primary PT:  Diagnosis:   Things to Focus On (goals):   Surgical Precautions:  Medical Precautions:  [] C-9 dates  [] Occ Med   [] Medicare       Date 22   Visit # 10/12 11/12 12/20 14/20   Walk F/L/R 2 Laps 2 Laps 2 Laps 2 Laps   Marching 2 Laps 2 Laps 2 Laps 2 Laps      Low box Low box   Squats 15x5\" Low 15x5\" 15x5\" 15x5\"   Step-Ups F/L Tall 15x Tall 15x Tall 15x Tall 15x  Lat up&over   Step Down F/L       Heel-toe raises 15x 15x 15x 15x   SLR F/L/R 15x 15x 15x 15x   Hip/Knee Flex/Ext 15x 15x 15x 15x   F/L Lunges Tall 10x Tall 10x Tall 10x Tall 15x          Kickboard Ex. Lg Lg Lg Lg    Iso Abd. 10x5\" 10x5\" 15x5\" 15x5\"   Push-pull 15x 15x 15x 15x   Paddling 15x 15x 15x 15x          UE Format:       Horiz Abd/Add 15x 15x 15x 15x   IR/ER (wipers) 15x 15x 15x 15x   Alt Flex/Ext 15x 15x 15x 15x   Alt Press Down 15x 15x 15x 15x   Abd/Add 15x 15x 15x 15x          Deep Water: 1 Noodle 1 Noodle 1 Noodle 1 Noodle   Hang 5' 5' 5' 5'   Cycling 2' 2' 2' 2'   Jacks 2' 2' 2' 2'   X-Country 2' 2' 2' 2'          Balance       SLS              Stretches       Achllies 2x20\" 2x20\" 2x20\" 2x20\"   Hamstring 2x20\" 2x20\" 2x20\" 2x20\"          Cool Down 2 Laps 2 Laps Breast stroke 2 Laps Breast stroke 2 Laps  Breast Stroke   Pain Rating 7 8 7-8 8     Specific Instructions for next treatment:  Small buoyancy cuffs    Assessment: [x] Progressing toward goals. Progressed step ups to lateral up and over to challenge dynamic core/hip stability. Good tolerance to progression but did need 1 UE support for balance. Continued with cueing for postural awareness throughout. Denies any increased pain with exercises today. Added deep water hang for unloading of spine. [] No change. [] Other:    [x] Patient would continue to benefit from skilled physical therapy services in order to: Address TRUNK ROM, ABNORMAL POSTURE, POOR TOLERANCE TO STANDING AND WALKING AND WORK TOWARD THE STATED GOALS    STG: (to be met in 8-12 treatments)  ? Pain: NO GREATER THAN 4/10 AT NIGHT FOR IMPROVED REST (IMPROVED NOT MET)  ? ROM: 50% ALL PLANES (IMPROVED NOT MET)  ? Strength:  ? Function: IMPROVE OSWESTRY (27) BY 7 (IMPROVED BY 6)  Independent with Home Exercise Programs (MET)  LTG: (to be met in 20 treatments)   CONTINUE TO WORK TOWARDS STG'S   OSWESTRY =/<  17/50  Patient goals: DECREASE PAIN    Pt.  Education:  [x] Yes  [] No  [x] Reviewed Prior HEP/Ed  Method of Education: [x] Verbal  [x] Demo  [] Written  Comprehension of Education:  [x] Verbalizes understanding. [x] Demonstrates understanding. [] Needs review. [] Demonstrates/verbalizes HEP/Ed previously given. Plan: [x] Continue per plan of care.    [] Other:       Treatment Charges: Mins Units   []  Modalities     []  Ther Exercise     []  Manual Therapy     []  Ther Activities     [x]  Aquatics 48 3   []  Neuromuscular     [] Vasocompression     [] Gait Training     [] Dry needling        [] 1 or 2 muscles        [] 3 or more muscles     []  Other     Total Treatment time 48 3     Time In: 0295           Time Out: 4139    Electronically signed by:  Ryan Brownlee PTA

## 2022-08-01 ENCOUNTER — HOSPITAL ENCOUNTER (OUTPATIENT)
Dept: PHYSICAL THERAPY | Age: 77
Setting detail: THERAPIES SERIES
Discharge: HOME | End: 2022-08-01
Payer: MEDICARE

## 2022-08-01 PROCEDURE — 97110 THERAPEUTIC EXERCISES: CPT

## 2022-08-01 PROCEDURE — 97140 MANUAL THERAPY 1/> REGIONS: CPT

## 2022-08-01 NOTE — PROGRESS NOTES
509 [facility] Out[facility]   [address]   Phone: [phone]   Fax: [phone]    Physical Therapy Daily Treatment Note      Date:  2022  Patient Name:  Abelardo Blake    :  [birth year]  MRN: [id number]    Physician: Yoselin Zafar MD  Insurance: [facility] MEDICARE, $15 PER VISIT BASED ON MEDICAL NECESSITY PER APPOINTMENT NOTE  Medical Diagnosis:   M54.40, G89.29 (ICD-10-CM) - Chronic midline low back pain with sciatica, sciatica laterality unspecified             Rehab Codes: M25.60 -BACK STIFFNESS, M54.50 -LOW BACK PAIN, R26.2 -DIFF WALKING, R29.3 - ABNORMAL POSTURE  Onset Date: ONGOING                     Next 's appt. : 2022  Visit# / total visits: 15/20                  Cancels/No Shows: 0/0  Subjective:  PATIENT STATES HE HAS NOT DONE ANY EXERCISING SINCE LAST Thursday DUE TO HIS WIFE DEVELOPING KIDNEY STONE AND HAVING TO GO TO HOSPITAL. STATE HE WOULD LIKE TO FINISH HIS THERAPY IN THE GYM IN ORDER TO GET A GOOD ROUTINE FOR STRENGTHENING HIS BACK AFTER D/C. Pain:  [x] Yes  [] No Location: R SIDE BACK, HIP AND LATERAL THIGH Pain Rating: (0-10 scale) 7/10 (WALKING IN FROM THE WAITING ROOM)  Pain altered Tx:  [] No  [x] Yes  Action: MODIFIED EXERCISES      Objective:  Modalities: NONE TODAY, CONSIDER MHP AND IFES.   Precautions: B TOTAL SHOULDER REPLACEMENTS  Exercises:  Exercise Reps/ Time Weight/ Level Completed  Today Comments   Nustep 5' L5 X GATHERED SUBJECTIVE DATA   Reviewed HEP       ABDOMINAL BRACING 5\"X10  X    SUPINE CLAM SHELL WITH T BAND 5\"X10 EA RED X NOW HEP   REVERSE CLAM SHELL 10 EA  X    SIDELYING OPEN BOOK    ADD   HIP ADD ISOMETRICS 5\"X10   BALL, NOW HEP   MARCHING WITH T BAND 10 EA RED  NOW HEP   DOUBLE KNEE TO CHEST 30\"X4  X THERAPIST ASSISTED DUE TO SHOULDER WEAKNESS   RESISTED HIP FLEXION       2 WAY SLR IN STANDING (EXT AND ABD) 10 EA  X HELD T BAND          Other: PATIENT EDUCATION REGARDING AVOIDING LIFTING B LE'S AT THE SAME TIME IN SUPINE POSITION. MANUAL:HYPERVOLT TO B HIPS, B BUTTOCKS, LATERAL THIGH, LOW BACK X15'(HALF IN R SIDELYING POSITION AND THE OTHER HALF IN LEFT. INTENSITY WAS MEDIUM USING ACCORDION ATTACHMENT. PROGRESSED HEP, ISSUED COPY AND RED T BAND. Specific Instructions for next treatment: PROGRESS GYM PROGRAM FOR CORE STRENGTHENING AND IMPROVED TRUNK ROM. LIMIT TIME IN STANDING DUE TO POOR TOLERANCE. Assessment:   PATIENT REPORTED LESS PAIN AND THE ABILITY TO WALK WITH BETTER POSTURE FOLLOWING TREATMENT. [] Progressing toward goals. [] No change. [] Other:    [x] Patient would continue to benefit from skilled physical therapy services in order to: Address TRUNK ROM, ABNORMAL POSTURE, POOR TOLERANCE TO STANDING AND WALKING AND WORK TOWARD THE STATED GOALS     STG: (to be met in 8-12 treatments)  ? Pain: NO GREATER THAN 4/10 AT NIGHT FOR IMPROVED REST (IMPROVED NOT MET)  ? ROM: 50% ALL PLANES (IMPROVED NOT MET)  ? Strength:  ? Function: IMPROVE OSWESTRY (27) BY 7 (IMPROVED BY 6)  Independent with Home Exercise Programs (MET)  LTG: (to be met in 20 treatments)   CONTINUE TO WORK TOWARDS STG'S   OSWESTRY =/<  17/50  Patient goals: DECREASE PAIN    Pt. Education:  [x] Yes: PROGRESS HEP  [] No  [x] Reviewed Prior HEP/Ed    Exercise Reps/ Time Weight/ Level Comments   LTR 5\"X10 EA   QD HEP   SKC 5\"X10 EA   QD HEP   SUPINE HS STRETCH 20\"X3 EA   QD HEP   ABDOMINAL BRACING 5\"X10   QD HEP   SLR WITH TrA 5\"X10 EA   QD HEP   SUPINE CLAM WITH T BAND 5\"X10 RED ADDED 8/1, QD HEP   HIP ADD ISOMETRIC WITH TrA 5\"X10  ADDED 8/1, QD HEP   SUPINE MARCHING WITH T BAND  5\"X10 RED ADDED 8/1, QD HEP     Method of Education: [x] Verbal  [x] Demo  [x] Written  Comprehension of Education:  [x] Verbalizes understanding. [x] Demonstrates understanding. [] Needs review. [] Demonstrates/verbalizes HEP/Ed previously given. Plan: [x] Continue per plan of care.    [] Other:      Treatment Charges: Mins Units   [] Modalities     [x]  Ther Exercise 30 2   [x]  Manual Therapy 15 1   []  Ther Activities     []  Aquatics     []  Neuromuscular     [] Vasocompression     [] Gait Training     [] Dry needling        [] 1 or 2 muscles        [] 3 or more muscles     []  Other     Total Treatment time 45 3     Time In:   0800         Time Out: 1656    Electronically signed by:   Rafi De Los Santos PT

## 2022-08-04 ENCOUNTER — HOSPITAL ENCOUNTER (OUTPATIENT)
Dept: PHYSICAL THERAPY | Age: 77
Setting detail: THERAPIES SERIES
Discharge: HOME | End: 2022-08-04
Payer: MEDICARE

## 2022-08-04 PROCEDURE — 97113 AQUATIC THERAPY/EXERCISES: CPT

## 2022-08-04 NOTE — FLOWSHEET NOTE
509 [facility] Outpatient Physical Therapy   [address] [place] [address] [address]   Phone: [phone]   Fax: [phone]     Physical Therapy Daily Treatment Note    Date:  2022  Patient Name:  Nova Fernandez    :  [birth year]  MRN: [id number]  Physician: Shreya Vallecillo MD   Insurance: [employer] MEDICARE, $15 PER VISIT BASED ON MEDICAL NECESSITY PER APPOINTMENT NOTE  Medical Diagnosis:   M54.40, G89.29 (ICD-10-CM) - Chronic midline low back pain with sciatica, sciatica laterality unspecified             Rehab Codes: M25.60 -BACK STIFFNESS, M54.50 -LOW BACK PAIN, R26.2 -DIFF WALKING, R29.3 - ABNORMAL POSTURE  Onset Date: ONGOING                     Next 's appt. : 2022  Visit# / total visits:   Cancels/No Shows: 0/0    Subjective:  Patient reports increased pain upon arrival but stood in shower for 10 minutes with the hot water running on his back and pain is down to 4/10 at start of treatment. States pain has still been high first thing in the morning. Pain:  [x] Yes  [] No Location: Lumbar back L>R   Pain Rating: (0-10 scale) 4/10, 4/10 when initially waking. Pain altered Tx:  [x] No  [] Yes  Action:    Comments:  reviewed postural awareness and core stability. Also educated on benefits of aquatic therapy and working in pain free ranges. Objective:      [address] Services Exercise Log  Aquatic, Hip & DLS Program- Phase 1    Date of Eval:                                Primary PT:  Diagnosis:   Things to Focus On (goals):   Surgical Precautions:  Medical Precautions:  [] C-9 dates  [] Occ Med   [] Medicare       Date 22   Visit # 10/12 11/12 12/20 14/20 16/20   Walk F/L/R 2 Laps 2 Laps 2 Laps 2 Laps 2 Laps   Marching 2 Laps 2 Laps 2 Laps 2 Laps 2 Laps      Low box Low box Low box   Squats 15x5\" Low 15x5\" 15x5\" 15x5\" 15x5\"   Step-Ups F/L Tall 15x Tall 15x Tall 15x Tall 15x  Lat up&over Tall 15x  Lat up&over   Step Down F/L        Heel-toe raises 15x 15x 15x 15x 15x   SLR F/L/R 15x 15x 15x 15x 15x   Hip/Knee Flex/Ext 15x 15x 15x 15x 15x   F/L Lunges Tall 10x Tall 10x Tall 10x Tall 15x Tall 15x           Kickboard Ex. Lg Lg Lg Lg  Lg   Iso Abd. 10x5\" 10x5\" 15x5\" 15x5\" 15x5\"   Push-pull 15x 15x 15x 15x 15x   Paddling 15x 15x 15x 15x 15x           UE Format:     T/C   Horiz Abd/Add 15x 15x 15x 15x 1'(20)   IR/ER (wipers) 15x 15x 15x 15x 1'   Alt Flex/Ext 15x 15x 15x 15x 1'(15)   Alt Press Down 15x 15x 15x 15x 1'   Abd/Add 15x 15x 15x 15x 1'(20)           Deep Water: 1 Noodle 1 Noodle 1 Noodle 1 Noodle 1 Noodle   Hang 5' 5' 5' 5' 5'   Cycling 2' 2' 2' 2' 2'   Jacks 2' 2' 2' 2' 2'   X-Country 2' 2' 2' 2' 2'           Balance        SLS                Stretches        Achllies 2x20\" 2x20\" 2x20\" 2x20\" 2x20\"   Hamstring 2x20\" 2x20\" 2x20\" 2x20\" 2x20\"           Cool Down 2 Laps 2 Laps Breast stroke 2 Laps Breast stroke 2 Laps  Breast Stroke 2 Laps Breast Stroke   Pain Rating 7 8 7-8 8 4     Specific Instructions for next treatment:  Small buoyancy cuffs    Assessment: [x] Progressing toward goals. No UE support with all exercises on low box today. Good tolerance to exercises but did need 1 UE support while on low box for hip/knee flex/ext exercise. Progressed UE format to time/count to encourage increased speeds and therefore increased resistance to challenge trunk/core stability. [] No change. [] Other:    [x] Patient would continue to benefit from skilled physical therapy services in order to: Address TRUNK ROM, ABNORMAL POSTURE, POOR TOLERANCE TO STANDING AND WALKING AND WORK TOWARD THE STATED GOALS    STG: (to be met in 8-12 treatments)  ? Pain: NO GREATER THAN 4/10 AT NIGHT FOR IMPROVED REST (IMPROVED NOT MET)  ? ROM: 50% ALL PLANES (IMPROVED NOT MET)  ? Strength:  ?  Function: IMPROVE OSWESTRY (27) BY 7 (IMPROVED BY 6)  Independent with Home Exercise Programs (MET)  LTG: (to be met in 20 treatments)   CONTINUE TO WORK TOWARDS STG'S   OSWESTRY =/<  17/50  Patient goals: DECREASE PAIN    Pt. Education:  [x] Yes  [] No  [x] Reviewed Prior HEP/Ed  Method of Education: [x] Verbal  [x] Demo  [] Written  Comprehension of Education:  [x] Verbalizes understanding. [x] Demonstrates understanding. [] Needs review. [] Demonstrates/verbalizes HEP/Ed previously given. Plan: [x] Continue per plan of care.    [] Other:       Treatment Charges: Mins Units   []  Modalities     []  Ther Exercise     []  Manual Therapy     []  Ther Activities     [x]  Aquatics 48 3   []  Neuromuscular     [] Vasocompression     [] Gait Training     [] Dry needling        [] 1 or 2 muscles        [] 3 or more muscles     []  Other     Total Treatment time 48 3     Time In: 8704           Time Out: 0830    Electronically signed by:  Delmis Silver PTA

## 2022-08-05 RX ORDER — ATORVASTATIN CALCIUM 80 MG/1
TABLET, FILM COATED ORAL
Qty: 90 TABLET | Refills: 0 | Status: SHIPPED | OUTPATIENT
Start: 2022-08-05 | End: 2022-10-25 | Stop reason: SDUPTHER

## 2022-08-10 ENCOUNTER — HOSPITAL ENCOUNTER (OUTPATIENT)
Age: 77
Setting detail: SPECIMEN
Discharge: HOME | End: 2022-08-10

## 2022-08-10 LAB — PROSTATE SPECIFIC ANTIGEN: 0.6 NG/ML

## 2022-08-16 ENCOUNTER — HOSPITAL ENCOUNTER (OUTPATIENT)
Dept: PHYSICAL THERAPY | Age: 77
Setting detail: THERAPIES SERIES
Discharge: HOME | End: 2022-08-16
Payer: MEDICARE

## 2022-08-16 PROCEDURE — 97140 MANUAL THERAPY 1/> REGIONS: CPT

## 2022-08-16 PROCEDURE — 97110 THERAPEUTIC EXERCISES: CPT

## 2022-08-16 NOTE — PROGRESS NOTES
509 [facility] Outpatient Physical Therapy   [address]   Phone: [phone]   Fax: [phone]    Physical Therapy Daily Treatment Note      Date:  2022  Patient Name:  Marizol Anaya    :  [birth year]  MRN: [id number]    Physician: Ralph Finley MD  Insurance: [facility] MEDICARE, $15 PER VISIT BASED ON MEDICAL NECESSITY PER APPOINTMENT NOTE  Medical Diagnosis:   M54.40, G89.29 (ICD-10-CM) - Chronic midline low back pain with sciatica, sciatica laterality unspecified             Rehab Codes: M25.60 -BACK STIFFNESS, M54.50 -LOW BACK PAIN, R26.2 -DIFF WALKING, R29.3 - ABNORMAL POSTURE  Onset Date: ONGOING                     Next 's appt. : 2022  Visit# / total visits:                   Cancels/No Shows: 0/0  Subjective:  PATIENT STATES HIS BACK PAIN IS INCREASED DUE TO WALKING ACROSS THE PARKING LOT. REPORTS COMPLIANCE WITH HEP AND CONTINUING STATIONARY BIKE BUT NOT TO THE POINT OF INCREASED PAIN. REPORTS ALMOST NO PAIN AFTER TREATMENT  Pain:  [x] Yes  [] No Location: R SIDE BACK, HIP AND LATERAL THIGH Pain Rating: (0-10 scale) 6-7/10   Pain altered Tx:  [] No  [x] Yes  Action: MODIFIED EXERCISES, HYPER VOLT      Objective:  Modalities: NONE TODAY, CONSIDER MHP AND IFES. Precautions: B TOTAL SHOULDER REPLACEMENTS  Exercises:  Exercise Reps/ Time Weight/ Level Completed  Today Comments   Nustep 5' L5 X GATHERED SUBJECTIVE DATA   LTR on physioball 5\"X10  X    BRIDGES WITH PHYSIOBALL 5\"X10  X    RESISTED HIP FLEX WITH BRACE 5\"X10 EA  X    SUPINE HS STRETCH 30\"X2 EA  X THERAPIST ASSISTED   [facility] THERAPIST ASSIST 30\"X3  X THERAPIST ASSIST DU TO TSA/SH WEAKNESS   CLAM SHELL WITH T BAND 5\"X10 EA RED X    REVERSE CLAM SHELL 10 EA  X    SIDELYING OPEN BOOK    ADD                 PAYOFF PRESS    ADD   2 WAY SLR IN STANDING (EXT AND ABD) 10 EA YELLOW X           Other: PATIENT EDUCATION REGARDING PACING HIMSELF.   HE IS TAKING HIS WIFE TO THE HOSPITAL FOR A PROCEDURE

## 2022-08-18 ENCOUNTER — HOSPITAL ENCOUNTER (OUTPATIENT)
Dept: PHYSICAL THERAPY | Age: 77
Setting detail: THERAPIES SERIES
Discharge: HOME | End: 2022-08-18
Payer: MEDICARE

## 2022-08-18 PROCEDURE — 97110 THERAPEUTIC EXERCISES: CPT

## 2022-08-18 PROCEDURE — 97140 MANUAL THERAPY 1/> REGIONS: CPT

## 2022-08-18 NOTE — PROGRESS NOTES
509 [facility] Outpatient Physical Therapy   [address]   Phone: [phone]   Fax: [phone]    Physical Therapy Daily Treatment Note      Date:  2022  Patient Name:  Karen Watts    :    MRN: [id number]    Physician: Reyes Negro MD  Insurance: [employer] MEDICARE, $15 PER VISIT BASED ON MEDICAL NECESSITY PER APPOINTMENT NOTE  Medical Diagnosis:   M54.40, G89.29 (ICD-10-CM) - Chronic midline low back pain with sciatica, sciatica laterality unspecified             Rehab Codes: M25.60 -BACK STIFFNESS, M54.50 -LOW BACK PAIN, R26.2 -DIFF WALKING, R29.3 - ABNORMAL POSTURE  Onset Date: ONGOING                     Next 's appt. : 2022  Visit# / total visits:                   Cancels/No Shows: 0/0  Subjective:  PATIENT REPORTS HE DID A LOT OF WALKING WHILE TAKING HIS WIFE TO [place] [address] FOR A PROCEDURE AND TO THE ED LATER THAT DAY. REPORTS HE IS ALMOST PAIN FREE WHEN HE LEAVES THERAPY AND IT LAST FOR ABOUT ONE HOUR. DECREASED COMPLIANCE WITH HEP DUE TO WIFE BEING ILL IN ADDITION TO HER DEMENTIA. Pain:  [x] Yes  [] No Location: R SIDE BACK, HIP AND LATERAL THIGH Pain Rating: (0-10 scale) 6-7/10 -WALKING IN   Pain altered Tx:  [] No  [x] Yes  Action: MODIFIED EXERCISES, HYPER VOLT      Objective:  Modalities: NONE TODAY, CONSIDER MHP AND IFES.   Precautions: B TOTAL SHOULDER REPLACEMENTS  Exercises:  Exercise Reps/ Time Weight/ Level Completed  Today Comments   Nustep 5' L5 X GATHERED SUBJECTIVE DATA   LTR on physioball 5\"X10  X    BRIDGES WITH PHYSIOBALL 5\"X10  X    RESISTED HIP FLEX WITH BRACE 5\"X10 EA  X    SUPINE HS STRETCH 30\"X3 EA  X THERAPIST ASSISTED   [facility] THERAPIST ASSIST 30\"X3   THERAPIST ASSIST DU TO TSA/SH WEAKNESS   CLAM SHELL WITH T BAND 5\"X10 EA RED X    REVERSE CLAM SHELL 10 EA  X    SIDELYING OPEN BOOK 10 EA  X                  PAYOFF PRESS    ADD   2 WAY SLR IN STANDING (EXT AND ABD) 10 EA YELLOW X           Other: ADDED SITTING HS STRETCHES TO HEP, ISSUED COPY. MANUAL: HYPERVOLT TO B HIPS, B BUTTOCKS, LATERAL THIGH, LOW BACK X15' (HALF IN R SIDELYING POSITION AND THE OTHER HALF IN LEFT). INTENSITY WAS MEDIUM USING ACCORDION ATTACHMENT. Specific Instructions for next treatment: PROGRESS GYM PROGRAM FOR CORE STRENGTHENING AND IMPROVED TRUNK ROM. LIMIT TIME IN STANDING DUE TO POOR TOLERANCE. Assessment:   PATIENT REPORTED LESS PAIN FOLLOWING TREATMENT. HE WAS ABLE TO WALK WITH MORE ERECT POSTURE FOLLOWING TREATMENT. [] Progressing toward goals. [] No change. [] Other:    [x] Patient would continue to benefit from skilled physical therapy services in order to: Address TRUNK ROM, ABNORMAL POSTURE, POOR TOLERANCE TO STANDING AND WALKING AND WORK TOWARD THE STATED GOALS     STG: (to be met in 8-12 treatments)  ? Pain: NO GREATER THAN 4/10 AT NIGHT FOR IMPROVED REST (IMPROVED NOT MET)  ? ROM: 50% ALL PLANES (IMPROVED NOT MET)  ? Strength:  ? Function: IMPROVE OSWESTRY (27) BY 7 (IMPROVED BY 6)  Independent with Home Exercise Programs (MET)  LTG: (to be met in 20 treatments)   CONTINUE TO WORK TOWARDS STG'S   OSWESTRY =/<  17/50  Patient goals: DECREASE PAIN    Pt. Education:  [x] Yes: PROGRESS HEP  [] No  [x] Reviewed Prior HEP/Ed    Exercise Reps/ Time Weight/ Level Comments   LTR 5\"X10 EA   QD HEP   SKC 5\"X10 EA   QD HEP   SUPINE HS STRETCH 20\"X3 EA   QD HEP   ABDOMINAL BRACING 5\"X10   QD HEP   SLR WITH TrA 5\"X10 EA   QD HEP   SUPINE CLAM WITH T BAND 5\"X10 RED ADDED 8/1, QD HEP   HIP ADD ISOMETRIC WITH TrA 5\"X10  ADDED 8/1, QD HEP   SUPINE MARCHING WITH T BAND  5\"X10 RED ADDED 8/1, QD HEP   SITTING HS STRETCH 30\" X3 EA  ADDED 8/18, QD HEP     Method of Education: [x] Verbal  [x] Demo  [x] Written  Comprehension of Education:  [x] Verbalizes understanding. [x] Demonstrates understanding. [] Needs review. [] Demonstrates/verbalizes HEP/Ed previously given. Plan: [x] Continue per plan of care.    [] Other:      Treatment Charges: Mins Units   []  Modalities     [x]  Ther Exercise 30 2   [x]  Manual Therapy 15 1   []  Ther Activities     []  Aquatics     []  Neuromuscular     [] Vasocompression     [] Gait Training     [] Dry needling        [] 1 or 2 muscles        [] 3 or more muscles     []  Other     Total Treatment time 45 3     Time In:   0845        Time Out: 5535    Electronically signed by:   Jaguar Seaman PT

## 2022-08-23 ENCOUNTER — HOSPITAL ENCOUNTER (OUTPATIENT)
Dept: PHYSICAL THERAPY | Age: 77
Setting detail: THERAPIES SERIES
Discharge: HOME | End: 2022-08-23
Payer: MEDICARE

## 2022-08-23 PROCEDURE — 97110 THERAPEUTIC EXERCISES: CPT

## 2022-08-23 PROCEDURE — 97140 MANUAL THERAPY 1/> REGIONS: CPT

## 2022-08-23 NOTE — PROGRESS NOTES
[facility]   [address] [facility] [address]   Phone: [phone]   Fax: [phone]    Physical Therapy Daily Treatment Note      Date:  2022  Patient Name:  Rodrigo Hastings    :    MRN: [id number]    Physician: Tima Farmer MD  Insurance: [facility] MEDICARE, $15 PER VISIT BASED ON MEDICAL NECESSITY PER APPOINTMENT NOTE  Medical Diagnosis:   M54.40, G89.29 (ICD-10-CM) - Chronic midline low back pain with sciatica, sciatica laterality unspecified             Rehab Codes: M25.60 -BACK STIFFNESS, M54.50 -LOW BACK PAIN, R26.2 -DIFF WALKING, R29.3 - ABNORMAL POSTURE  Onset Date: ONGOING                     Next 's appt. : 2022  Visit# / total visits:                   Cancels/No Shows: 0/0  Subjective:  PATIENT REPORTS HE HAD INCREASED BACK PAIN THIS MORNING AND TOOK CBD OIL AN NOW HIS PAIN IS MINIMAL. WIFE IS DOING BETTER SO LESS STRESS AT HOME. PLANS TO PERFORM BACK EXERCISES 3X/WEEK AND STATIONARY CYCLE 3X/WK AFTER D/C. Pain:  [x] Yes  [] No Location: R SIDE BACK, HIP AND LATERAL THIGH Pain Rating: (0-10 scale) DID NOT RATE/10   Pain altered Tx:  [] No  [x] Yes  Action: MODIFIED EXERCISES, HYPER VOLT      Objective:  Modalities: NONE TODAY, CONSIDER MHP AND IFES.   Precautions: B TOTAL SHOULDER REPLACEMENTS  Exercises:  Exercise Reps/ Time Weight/ Level Completed  Today Comments   Nustep 6' L5 X GATHERED SUBJECTIVE DATA   LTR on physioball 5\"X10  X    BRIDGES WITH PHYSIOBALL 5\"X10  X    RESISTED HIP FLEX WITH BRACE 5\"X10 EA  X    SUPINE HS STRETCH 30\"X3 EA  X THERAPIST ASSISTED   [facility] THERAPIST ASSIST 30\"X3  X THERAPIST ASSIST DU TO TSA/SH WEAKNESS   CLAM SHELL WITH T BAND 5\"X10 EA RED X    REVERSE CLAM SHELL 10 EA  X    SIDELYING OPEN BOOK 10 EA  X                  PAYOFF PRESS    ADD AS TIME ALLOWS   2 WAY SLR IN STANDING (EXT AND ABD) 10 EA YELLOW X           Other:     MANUAL: HYPERVOLT TO B HIPS, B BUTTOCKS, LATERAL THIGH, LOW BACK X15' (HALF IN R SIDELYING POSITION AND THE OTHER HALF IN LEFT). INTENSITY WAS MEDIUM USING ACCORDION ATTACHMENT. Specific Instructions for next treatment: REVIEW/PROGRESS HEP FOR D/C AFTER  NEXT SESSION. Assessment:   PATIENT REPORTED LESS PAIN FOLLOWING TREATMENT. HE WAS ABLE TO WALK WITH MORE ERECT POSTURE FOLLOWING TREATMENT. [] Progressing toward goals. [] No change. [] Other:    [x] Patient would continue to benefit from skilled physical therapy services in order to: Address TRUNK ROM, ABNORMAL POSTURE, POOR TOLERANCE TO STANDING AND WALKING AND WORK TOWARD THE STATED GOALS     STG: (to be met in 8-12 treatments)  ? Pain: NO GREATER THAN 4/10 AT NIGHT FOR IMPROVED REST (IMPROVED NOT MET)  ? ROM: 50% ALL PLANES (IMPROVED NOT MET)  ? Strength:  ? Function: IMPROVE OSWESTRY (27) BY 7 (IMPROVED BY 6)  Independent with Home Exercise Programs (MET)  LTG: (to be met in 20 treatments)   CONTINUE TO WORK TOWARDS STG'S   OSWESTRY =/<  17/50  Patient goals: DECREASE PAIN    Pt. Education:  [x] Yes: PROGRESS HEP  [] No  [x] Reviewed Prior HEP/Ed    Exercise Reps/ Time Weight/ Level Comments   LTR 5\"X10 EA   QD HEP   SKC 5\"X10 EA   QD HEP   SUPINE HS STRETCH 20\"X3 EA   QD HEP   ABDOMINAL BRACING 5\"X10   QD HEP   SLR WITH TrA 5\"X10 EA   QD HEP   SUPINE CLAM WITH T BAND 5\"X10 RED ADDED 8/1, QD HEP   HIP ADD ISOMETRIC WITH TrA 5\"X10  ADDED 8/1, QD HEP   SUPINE MARCHING WITH T BAND  5\"X10 RED ADDED 8/1, QD HEP   SITTING HS STRETCH 30\" X3 EA  ADDED 8/18, QD HEP     Method of Education: [x] Verbal  [x] Demo  [x] Written  Comprehension of Education:  [x] Verbalizes understanding. [x] Demonstrates understanding. [] Needs review. [] Demonstrates/verbalizes HEP/Ed previously given. Plan: [] Continue per plan of care. [x] Other: REVIEW/PROGRESS HEP, ONE MORE SESSION THEN D/C.       Treatment Charges: Mins Units   []  Modalities     [x]  Ther Exercise 30 2   [x]  Manual Therapy 15 1   []  Ther Activities []  Aquatics     []  Neuromuscular     [] Vasocompression     [] Gait Training     [] Dry needling        [] 1 or 2 muscles        [] 3 or more muscles     []  Other     Total Treatment time 45 3     Time In:   0800       Time Out: 5676    Electronically signed by:   Caleb Overall, PT

## 2022-08-25 ENCOUNTER — HOSPITAL ENCOUNTER (OUTPATIENT)
Dept: PHYSICAL THERAPY | Age: 77
Setting detail: THERAPIES SERIES
Discharge: HOME | End: 2022-08-25
Payer: MEDICARE

## 2022-08-25 PROCEDURE — 97110 THERAPEUTIC EXERCISES: CPT

## 2022-08-25 PROCEDURE — 97140 MANUAL THERAPY 1/> REGIONS: CPT

## 2022-08-25 NOTE — PROGRESS NOTES
[facility]   [address]  [facility] [address]   Phone: [phone]   Fax: [phone]    Physical Therapy Daily Treatment Note/DISCHARGE      Date:  2022  Patient Name:  Yumiko Bang    :    MRN: [id number]    Physician: Mary Gamboa MD  Insurance: [facility] MEDICARE, $15 PER VISIT BASED ON MEDICAL NECESSITY PER APPOINTMENT NOTE  Medical Diagnosis:   M54.40, G89.29 (ICD-10-CM) - Chronic midline low back pain with sciatica, sciatica laterality unspecified             Rehab Codes: M25.60 -BACK STIFFNESS, M54.50 -LOW BACK PAIN, R26.2 -DIFF WALKING, R29.3 - ABNORMAL POSTURE  Onset Date: ONGOING                     Next 's appt. : 2022  Visit# / total visits:                   Cancels/No Shows: 0/0  Subjective:  PATIENT REPORTS HE PULLED A MUSCLE IN HIS UPPER BACK LAST SESSION -THINKS IT WAS THE \"OPEN THE BOOK\" EXERCISE. IMPROVED PAIN AT NIGHT. STILL POOR TOLERANCE TO STANDING AND WALKING  PLANS TO PERFORM BACK EXERCISES 3X/WEEK AND STATIONARY CYCLE 3X/WK AFTER D/C. Pain:  [x] Yes  [] No Location: R SIDE BACK, HIP AND LATERAL THIGH Pain Rating: (0-10 scale) 6/10 -WALKING FROM PARKING LOT  Pain altered Tx:  [] No  [x] Yes  Action: MODIFIED EXERCISES, HYPER VOLT      Objective:  Modalities: NONE TODAY, CONSIDER MHP AND IFES.   Precautions: B TOTAL SHOULDER REPLACEMENTS  Exercises:  Exercise Reps/ Time Weight/ Level Completed  Today Comments   Nustep 6' L5 X GATHERED SUBJECTIVE DATA   LTR on physioball 5\"X10  X    BRIDGES WITH PHYSIOBALL 5\"X10  X    RESISTED HIP FLEX WITH BRACE 5\"X10 EA  X    SUPINE HS STRETCH 30\"X3 EA  X SITTING TODAY   [facility] THERAPIST ASSIST 30\"X3  X THERAPIST ASSIST DU TO TSA/SH WEAKNESS   CLAM SHELL WITH T BAND 5\"X10 EA RED X    REVERSE CLAM SHELL 10 EA  X    SIDELYING OPEN BOOK 10 EA      2 WAY SLR IN STANDING (EXT AND ABD) 10 EA YELLOW X           OTHER: REVIEWED/PROGRESSED HEP  MANUAL: HYPERVOLT TO B HIPS, B BUTTOCKS, LATERAL THIGH, LOW BACK X15' (HALF IN R SIDELYING POSITION AND THE OTHER HALF IN LEFT). INTENSITY WAS MEDIUM USING ACCORDION ATTACHMENT. Assessment:     [x] Progressing toward goals. MODEST PROGRESS TOWARDS GOALS    [] No change. [] Other:    [] Patient would continue to benefit from skilled physical therapy services in order to: Address TRUNK ROM, ABNORMAL POSTURE, POOR TOLERANCE TO STANDING AND WALKING AND WORK TOWARD THE STATED GOALS     STG: (to be met in 8-12 treatments)  ? Pain: NO GREATER THAN 4/10 AT NIGHT FOR IMPROVED REST (8/25 IMPROVED NOT MET)  ? ROM: 50% ALL PLANES (8/25 IMPROVED NOT MET)  ? Strength:  ? Function: IMPROVE OSWESTRY (27) BY 7 (IMPROVED BY 6)  Independent with Home Exercise Programs (MET)  LTG: (to be met in 20 treatments)   CONTINUE TO WORK TOWARDS STG'S   OSWESTRY =/<  17/50 (8/25/NOT MET)  Patient goals: DECREASE PAIN    Pt. Education:  [x] Yes: PROGRESS HEP  [] No  [x] Reviewed Prior HEP/Ed    Exercise Reps/ Time Weight/ Level Comments   LTR 5\"X10 EA   QD HEP   SKC 5\"X10 EA   QD HEP   SUPINE HS STRETCH 20\"X3 EA   QD HEP   ABDOMINAL BRACING 5\"X10   QD HEP   SLR WITH TrA 5\"X10 EA   QD HEP   SUPINE CLAM WITH T BAND 5\"X10 RED -progressed to green 8/25 ADDED 8/1, QD HEP   HIP ADD ISOMETRIC WITH TrA 5\"X10  ADDED 8/1, QD HEP   SUPINE MARCHING WITH T BAND  5\"X10 RED -progressed to green 8/25 ADDED 8/1, QD HEP   SITTING HS STRETCH 30\" X3 EA  ADDED 8/18, QD HEP   Sitting flexion stretch 10\"x5  Added 8/25     Method of Education: [x] Verbal  [x] Demo  [x] Written  Comprehension of Education:  [x] Verbalizes understanding. [x] Demonstrates understanding. [] Needs review. [] Demonstrates/verbalizes HEP/Ed previously given. Plan: [] Continue per plan of care.    [x] Other: D/C WITH INDEP HEP      Treatment Charges: Mins Units   []  Modalities     [x]  Ther Exercise 30 2   [x]  Manual Therapy 15 1   []  Ther Activities     []  Aquatics     []  Neuromuscular     [] Vasocompression

## 2022-09-22 ENCOUNTER — OFFICE VISIT (OUTPATIENT)
Dept: INTERNAL MEDICINE CLINIC | Age: 77
End: 2022-09-22
Payer: MEDICARE

## 2022-09-22 VITALS
SYSTOLIC BLOOD PRESSURE: 126 MMHG | OXYGEN SATURATION: 97 % | WEIGHT: 234 LBS | DIASTOLIC BLOOD PRESSURE: 80 MMHG | BODY MASS INDEX: 33.5 KG/M2 | HEART RATE: 65 BPM | HEIGHT: 70 IN

## 2022-09-22 DIAGNOSIS — G89.29 CHRONIC MIDLINE LOW BACK PAIN WITH SCIATICA, SCIATICA LATERALITY UNSPECIFIED: Primary | ICD-10-CM

## 2022-09-22 DIAGNOSIS — I10 ESSENTIAL HYPERTENSION: ICD-10-CM

## 2022-09-22 DIAGNOSIS — I25.119 CORONARY ARTERY DISEASE INVOLVING NATIVE CORONARY ARTERY OF NATIVE HEART WITH ANGINA PECTORIS (HCC): ICD-10-CM

## 2022-09-22 DIAGNOSIS — J61 PULMONARY ASBESTOSIS (HCC): ICD-10-CM

## 2022-09-22 DIAGNOSIS — M54.40 CHRONIC MIDLINE LOW BACK PAIN WITH SCIATICA, SCIATICA LATERALITY UNSPECIFIED: Primary | ICD-10-CM

## 2022-09-22 DIAGNOSIS — B36.9 SKIN DISEASE, FUNGAL: ICD-10-CM

## 2022-09-22 PROCEDURE — 1123F ACP DISCUSS/DSCN MKR DOCD: CPT | Performed by: INTERNAL MEDICINE

## 2022-09-22 PROCEDURE — 99214 OFFICE O/P EST MOD 30 MIN: CPT | Performed by: INTERNAL MEDICINE

## 2022-09-22 SDOH — ECONOMIC STABILITY: FOOD INSECURITY: WITHIN THE PAST 12 MONTHS, YOU WORRIED THAT YOUR FOOD WOULD RUN OUT BEFORE YOU GOT MONEY TO BUY MORE.: NEVER TRUE

## 2022-09-22 SDOH — ECONOMIC STABILITY: FOOD INSECURITY: WITHIN THE PAST 12 MONTHS, THE FOOD YOU BOUGHT JUST DIDN'T LAST AND YOU DIDN'T HAVE MONEY TO GET MORE.: NEVER TRUE

## 2022-09-22 ASSESSMENT — ENCOUNTER SYMPTOMS
BACK PAIN: 1
WHEEZING: 0
EYE PAIN: 0
EYE DISCHARGE: 0
COUGH: 0
DIARRHEA: 0
TROUBLE SWALLOWING: 0
COLOR CHANGE: 0
ABDOMINAL DISTENTION: 0
BLOOD IN STOOL: 0
SHORTNESS OF BREATH: 0

## 2022-09-22 ASSESSMENT — SOCIAL DETERMINANTS OF HEALTH (SDOH): HOW HARD IS IT FOR YOU TO PAY FOR THE VERY BASICS LIKE FOOD, HOUSING, MEDICAL CARE, AND HEATING?: NOT HARD AT ALL

## 2022-09-22 NOTE — PROGRESS NOTES
Visit Information    Have you changed or started any medications since your last visit including any over-the-counter medicines, vitamins, or herbal medicines? no   Are you having any side effects from any of your medications? -  no  Have you stopped taking any of your medications? Is so, why? -  no    Have you seen any other physician or provider since your last visit? No  Have you had any other diagnostic tests since your last visit? No  Have you been seen in the emergency room and/or had an admission to a hospital since we last saw you? No  Have you had your routine dental cleaning in the past 6 months? no    Have you activated your [employer] account? If not, what are your barriers?  Yes     Patient Care Team:  Vanesa Soto MD as PCP - General (Internal Medicine)  Vanesa Soto MD as PCP - [facility]    Medical History Review  Past Medical, Family, and Social History reviewed and does contribute to the patient presenting condition    Health Maintenance   Topic Date Due    Flu vaccine (1) 09/01/2022    Lipids  11/18/2022    Depression Screen  01/18/2023    Annual Wellness Visit (AWV)  01/19/2023    DTaP/Tdap/Td vaccine (2 - Td or Tdap) 11/18/2031    Shingles vaccine  Completed    Pneumococcal 65+ years Vaccine  Completed    COVID-19 Vaccine  Completed    Hepatitis C screen  Completed    Hepatitis A vaccine  Aged Out    Hepatitis B vaccine  Aged Out    Hib vaccine  Aged Out    Meningococcal (ACWY) vaccine  Aged Out

## 2022-09-22 NOTE — PROGRESS NOTES
[address]  [place] [address]  Dept: [phone]  Dept Fax: [phone]    Melisa Guerra is a 76 y.o. male who presents today for his medical conditions/complaintsas noted below. Melisa Guerra is c/o of   Chief Complaint   Patient presents with    Back Pain     Finished PT the end of August, still having pain     Rash     In between legs         HPI:     Back pain  Onset more than 1year ago  Severity is moderate to severe  Not associated wt lossbut associated with radiation of pain on the legs  No bowel bladder incontinence   Aggravated with bending and better with pain meds and rest.  Controlled with current pain meds.     Rash  groin      Hemoglobin A1C (%)   Date Value   07/03/2013 5.4             ( goal A1Cis < 7)   No results found for: LABMICR  LDL Cholesterol (mg/dL)   Date Value   11/18/2021 71   07/23/2020 70   09/25/2019 61     LDL Calculated (mg/dL)   Date Value   02/11/2016 213 (A)       (goal LDL is <100)   AST (U/L)   Date Value   11/18/2021 20     ALT (U/L)   Date Value   11/18/2021 22     BUN (mg/dL)   Date Value   11/18/2021 17     BP Readings from Last 3 Encounters:   09/22/22 126/80   06/23/22 136/84   05/18/22 128/78          (goal 120/80)    Past Medical History:   Diagnosis Date    Acute gouty arthropathy     Backache, unspecified     Cellulitis and abscess of upper arm and forearm     Degeneration of lumbar or lumbosacral intervertebral disc     Essential hypertension, benign     Obstructive sleep apnea on CPAP     Osteoarthrosis, unspecified whether generalized or localized, unspecified site     Other and unspecified hyperlipidemia     Other malaise and fatigue     Other psoriasis     Sleep apnea     Thoracic or lumbosacral neuritis or radiculitis, unspecified     Unspecified vitamin D deficiency       Past Surgical History:   Procedure Laterality Date    JOINT REPLACEMENT Right Oct.6, 2015    right knee    SHOULDER SURGERY Bilateral rotator cuff    TONSILLECTOMY AND ADENOIDECTOMY      TURP      VASECTOMY         Family History   Problem Relation Age of Onset    Cancer Father        Social History     Tobacco Use    Smoking status: Former     Packs/day: 2.00     Years: 20.00     Pack years: 40.00     Types: Cigarettes     Quit date: 1970     Years since quittin.9    Smokeless tobacco: Never   Substance Use Topics    Alcohol use: No     Alcohol/week: 0.0 standard drinks      Current Outpatient Medications   Medication Sig Dispense Refill    miconazole (MICOTIN) 2 % cream Apply topically 2 times daily. 1 each 1    atorvastatin (LIPITOR) 80 MG tablet Take 1 tablet by mouth once daily 90 tablet 0    carvedilol (COREG) 12.5 MG tablet TAKE 1 TABLET BY MOUTH TWICE DAILY WITH MEALS 180 tablet 0    gabapentin (NEURONTIN) 300 MG capsule Take 1 capsule by mouth nightly for 180 days. Intended supply: 90 days 30 capsule 1    nitroGLYCERIN (NITROSTAT) 0.4 MG SL tablet       lisinopril-hydroCHLOROthiazide (PRINZIDE;ZESTORETIC) 20-25 MG per tablet Take 1 tablet by mouth 2 times daily 180 tablet 3    vitamin C (ASCORBIC ACID) 500 MG tablet Take 1,000 mg by mouth daily      vitamin E 400 UNIT capsule Take 400 Units by mouth daily      magnesium (MAGNESIUM-OXIDE) 250 MG TABS tablet Take 250 mg by mouth daily      zinc 50 MG CAPS Take by mouth      TURMERIC PO Take by mouth      MULTIPLE VITAMIN PO Take by mouth daily      aspirin 81 MG tablet Take 81 mg by mouth daily. tamsulosin (FLOMAX) 0.4 MG capsule Take 0.4 mg by mouth daily. dutasteride (AVODART) 0.5 MG capsule Take 0.5 mg by mouth daily. Vitamin D (CHOLECALCIFEROL) 1000 UNITS CAPS capsule Take 10,000 Units by mouth daily. No current facility-administered medications for this visit.      Allergies   Allergen Reactions    Mobic [Meloxicam] Shortness Of Breath    Nsaids Shortness Of Breath       Health Maintenance   Topic Date Due    Flu vaccine (1) 2022    Lipids 11/18/2022    Depression Screen  01/18/2023    Annual Wellness Visit (AWV)  01/19/2023    DTaP/Tdap/Td vaccine (2 - Td or Tdap) 11/18/2031    Shingles vaccine  Completed    Pneumococcal 65+ years Vaccine  Completed    COVID-19 Vaccine  Completed    Hepatitis C screen  Completed    Hepatitis A vaccine  Aged Out    Hepatitis B vaccine  Aged Out    Hib vaccine  Aged Out    Meningococcal (ACWY) vaccine  Aged Out       Subjective:     Review of Systems   Constitutional:  Negative for appetite change, diaphoresis and fatigue. HENT:  Negative for ear discharge and trouble swallowing. Eyes:  Negative for pain and discharge. Respiratory:  Negative for cough, shortness of breath and wheezing. Cardiovascular:  Negative for chest pain and palpitations. Gastrointestinal:  Negative for abdominal distention, blood in stool and diarrhea. Endocrine: Negative for polydipsia and polyphagia. Genitourinary:  Negative for difficulty urinating and frequency. Musculoskeletal:  Positive for back pain. Negative for gait problem, myalgias and neck pain. Skin:  Positive for rash. Negative for color change. Allergic/Immunologic: Negative for environmental allergies and food allergies. Neurological:  Negative for dizziness and headaches. Hematological:  Negative for adenopathy. Does not bruise/bleed easily. Psychiatric/Behavioral:  Negative for behavioral problems and sleep disturbance. Objective:     Physical Exam  Constitutional:       Appearance: He is well-developed. He is not diaphoretic. HENT:      Head: Normocephalic and atraumatic. Eyes:      General:         Right eye: No discharge. Left eye: No discharge. Extraocular Movements:      Right eye: Normal extraocular motion. Left eye: Normal extraocular motion. Conjunctiva/sclera: Conjunctivae normal.      Right eye: Right conjunctiva is not injected. Left eye: Left conjunctiva is not injected.    Neck:      Thyroid: No thyroid mass or thyromegaly. Vascular: No JVD. Cardiovascular:      Rate and Rhythm: Normal rate and regular rhythm. Heart sounds: No murmur heard. No friction rub. Pulmonary:      Effort: Pulmonary effort is normal. No tachypnea, bradypnea, accessory muscle usage or respiratory distress. Breath sounds: Normal breath sounds. No wheezing or rales. Abdominal:      General: Bowel sounds are normal. There is no distension. Palpations: Abdomen is soft. Tenderness: There is no abdominal tenderness. There is no rebound. Musculoskeletal:         General: No tenderness. Normal range of motion. Cervical back: Normal range of motion and neck supple. No edema or erythema. Lymphadenopathy:      Head:      Right side of head: No submental or submandibular adenopathy. Left side of head: No submental or submandibular adenopathy. Cervical: No cervical adenopathy. Skin:     General: Skin is warm. Coloration: Skin is not pale. Findings: No bruising, ecchymosis or rash. Neurological:      Mental Status: He is alert and oriented to person, place, and time. Cranial Nerves: No cranial nerve deficit. Sensory: No sensory deficit. Motor: No atrophy or abnormal muscle tone. Coordination: Coordination normal.   Psychiatric:         Mood and Affect: Mood is not anxious. Affect is not angry. Speech: Speech is not slurred. Behavior: Behavior normal. Behavior is not aggressive. Thought Content: Thought content does not include homicidal ideation. Cognition and Memory: Memory is not impaired. /80   Pulse 65   Ht 5' 10\" (1.778 m)   Wt 234 lb (106.1 kg)   SpO2 97%   BMI 33.58 kg/m²     Assessment:       Diagnosis Orders   1. Chronic midline low back pain with sciatica, sciatica laterality unspecified  MRI LUMBAR SPINE WO CONTRAST      2.  Coronary artery disease involving native coronary artery of native heart with angina pectoris (HCC)  Stable no cp      3. Pulmonary asbestosis ([address]  stable      4. Essential hypertension        5. Skin disease, fungal                  Plan:      No follow-ups on file. Orders Placed This Encounter   Procedures    MRI LUMBAR SPINE WO CONTRAST     Standing Status:   Future     Standing Expiration Date:   9/22/2023     Order Specific Question:   What is the sedation requirement? Answer:   None     Orders Placed This Encounter   Medications    miconazole (MICOTIN) 2 % cream     Sig: Apply topically 2 times daily. Dispense:  1 each     Refill:  1    Back pain  Not improving  Did over 6 weeks of PT  Failed injectdion in past  With pain mx  Will do mri  Rule out lumbar spinal stenosis       Patient given educational materials - see patient instructions. Discussed use, benefit, and side effects of prescribed medications. All patientquestions answered. Pt voiced understanding. Reviewed health maintenance. Instructedto continue current medications, diet and exercise. Patient agreed with treatmentplan. Follow up as directed. Please note that this chart was generated using voice recognition Dragon dictation software. Although every effort was made to ensure the accuracy of this automated transcription, some errors in transcription may have occurred.      Electronically signed by Yoselin Zafar MD on 9/22/2022 at 2:48 PM

## 2022-10-20 ENCOUNTER — HOSPITAL ENCOUNTER (OUTPATIENT)
Dept: MRI IMAGING | Facility: CLINIC | Age: 77
Discharge: HOME | End: 2022-10-22
Payer: MEDICARE

## 2022-10-20 DIAGNOSIS — M54.40 CHRONIC MIDLINE LOW BACK PAIN WITH SCIATICA, SCIATICA LATERALITY UNSPECIFIED: ICD-10-CM

## 2022-10-20 DIAGNOSIS — G89.29 CHRONIC MIDLINE LOW BACK PAIN WITH SCIATICA, SCIATICA LATERALITY UNSPECIFIED: ICD-10-CM

## 2022-10-20 PROCEDURE — 72148 MRI LUMBAR SPINE W/O DYE: CPT

## 2022-10-25 ENCOUNTER — OFFICE VISIT (OUTPATIENT)
Dept: INTERNAL MEDICINE CLINIC | Age: 77
End: 2022-10-25
Payer: MEDICARE

## 2022-10-25 VITALS — WEIGHT: 234 LBS | BODY MASS INDEX: 33.58 KG/M2 | DIASTOLIC BLOOD PRESSURE: 98 MMHG | SYSTOLIC BLOOD PRESSURE: 162 MMHG

## 2022-10-25 DIAGNOSIS — I10 ESSENTIAL HYPERTENSION: Primary | ICD-10-CM

## 2022-10-25 DIAGNOSIS — I25.119 CORONARY ARTERY DISEASE INVOLVING NATIVE CORONARY ARTERY OF NATIVE HEART WITH ANGINA PECTORIS (HCC): ICD-10-CM

## 2022-10-25 DIAGNOSIS — Z13.220 SCREENING FOR HYPERLIPIDEMIA: ICD-10-CM

## 2022-10-25 DIAGNOSIS — Z99.89 OBSTRUCTIVE SLEEP APNEA ON CPAP: ICD-10-CM

## 2022-10-25 DIAGNOSIS — J61 PULMONARY ASBESTOSIS (HCC): ICD-10-CM

## 2022-10-25 DIAGNOSIS — E78.2 MIXED DYSLIPIDEMIA: ICD-10-CM

## 2022-10-25 DIAGNOSIS — B37.2 INTERTRIGINOUS CANDIDIASIS: ICD-10-CM

## 2022-10-25 DIAGNOSIS — G47.33 OBSTRUCTIVE SLEEP APNEA ON CPAP: ICD-10-CM

## 2022-10-25 PROCEDURE — 99214 OFFICE O/P EST MOD 30 MIN: CPT | Performed by: INTERNAL MEDICINE

## 2022-10-25 PROCEDURE — 1123F ACP DISCUSS/DSCN MKR DOCD: CPT | Performed by: INTERNAL MEDICINE

## 2022-10-25 RX ORDER — LISINOPRIL AND HYDROCHLOROTHIAZIDE 25; 20 MG/1; MG/1
1 TABLET ORAL 2 TIMES DAILY
Qty: 180 TABLET | Refills: 3 | Status: SHIPPED | OUTPATIENT
Start: 2022-10-25

## 2022-10-25 RX ORDER — AMLODIPINE BESYLATE 5 MG/1
5 TABLET ORAL DAILY
Qty: 30 TABLET | Refills: 3 | Status: SHIPPED | OUTPATIENT
Start: 2022-10-25

## 2022-10-25 RX ORDER — ATORVASTATIN CALCIUM 80 MG/1
TABLET, FILM COATED ORAL
Qty: 90 TABLET | Refills: 3 | Status: SHIPPED | OUTPATIENT
Start: 2022-10-25

## 2022-10-25 RX ORDER — FLUCONAZOLE 150 MG/1
150 TABLET ORAL DAILY
Qty: 10 TABLET | Refills: 1 | Status: SHIPPED | OUTPATIENT
Start: 2022-10-25

## 2022-10-25 RX ORDER — CARVEDILOL 12.5 MG/1
TABLET ORAL
Qty: 180 TABLET | Refills: 3 | Status: SHIPPED | OUTPATIENT
Start: 2022-10-25

## 2022-10-25 ASSESSMENT — ENCOUNTER SYMPTOMS
COUGH: 0
WHEEZING: 0
BLOOD IN STOOL: 0
DIARRHEA: 0
ABDOMINAL DISTENTION: 0
EYE DISCHARGE: 0
EYE PAIN: 0
SHORTNESS OF BREATH: 0
BACK PAIN: 1
COLOR CHANGE: 0
TROUBLE SWALLOWING: 0

## 2022-10-25 NOTE — PROGRESS NOTES
Visit Information    Have you changed or started any medications since your last visit including any over-the-counter medicines, vitamins, or herbal medicines? no   Are you having any side effects from any of your medications? -  no  Have you stopped taking any of your medications? Is so, why? -  no    Have you seen any other physician or provider since your last visit? Yes - Records Obtained  Have you had any other diagnostic tests since your last visit? No  Have you been seen in the emergency room and/or had an admission to a hospital since we last saw you? No  Have you had your routine dental cleaning in the past 6 months? no    Have you activated your [employer] account? If not, what are your barriers?  Yes     Patient Care Team:  Josué Smith MD as PCP - General (Internal Medicine)  Josué Smith MD as PCP - [facility]    Medical History Review  Past Medical, Family, and Social History reviewed and does not contribute to the patient presenting condition    Health Maintenance   Topic Date Due    Lipids  11/18/2022    Depression Screen  01/18/2023    Annual Wellness Visit (AWV)  01/19/2023    DTaP/Tdap/Td vaccine (2 - Td or Tdap) 11/18/2031    Flu vaccine  Completed    Shingles vaccine  Completed    Pneumococcal 65+ years Vaccine  Completed    COVID-19 Vaccine  Completed    Hepatitis C screen  Completed    Hepatitis A vaccine  Aged Out    Hib vaccine  Aged Out    Meningococcal (ACWY) vaccine  Aged Out

## 2022-10-25 NOTE — PROGRESS NOTES
[address]  [place] [address]  Dept: [phone]  Dept Fax: [phone]    Asia Turpin is a 76 y.o. male who presents today for his medical conditions/complaintsas noted below.   Asia Turpin is c/o of   Chief Complaint   Patient presents with    Back Pain     1 mo fu on back pain, review MRI    Rash     Rash in groin area, cream provided did not improve     Hypertension         HPI:     HTN  Onset more than 2 years ago  oniel mild to mod  Controlled with current po meds  Not associated with headaches or blurry vision  No chest pain      Rash   Groin did not improved with miconazole powder        Hemoglobin A1C (%)   Date Value   07/03/2013 5.4             ( goal A1Cis < 7)   No results found for: LABMICR  LDL Cholesterol (mg/dL)   Date Value   11/18/2021 71   07/23/2020 70   09/25/2019 61     LDL Calculated (mg/dL)   Date Value   02/11/2016 213 (A)       (goal LDL is <100)   AST (U/L)   Date Value   11/18/2021 20     ALT (U/L)   Date Value   11/18/2021 22     BUN (mg/dL)   Date Value   11/18/2021 17     BP Readings from Last 3 Encounters:   10/25/22 (!) 162/98   09/22/22 126/80   06/23/22 136/84          (goal 120/80)    Past Medical History:   Diagnosis Date    Acute gouty arthropathy     Backache, unspecified     Cellulitis and abscess of upper arm and forearm     Degeneration of lumbar or lumbosacral intervertebral disc     Essential hypertension, benign     Obstructive sleep apnea on CPAP     Osteoarthrosis, unspecified whether generalized or localized, unspecified site     Other and unspecified hyperlipidemia     Other malaise and fatigue     Other psoriasis     Sleep apnea     Thoracic or lumbosacral neuritis or radiculitis, unspecified     Unspecified vitamin D deficiency       Past Surgical History:   Procedure Laterality Date    JOINT REPLACEMENT Right Oct.6, 2015    right knee    SHOULDER SURGERY Bilateral rotator cuff    TONSILLECTOMY AND ADENOIDECTOMY      TURP      VASECTOMY         Family History   Problem Relation Age of Onset    Cancer Father        Social History     Tobacco Use    Smoking status: Former     Packs/day: 2.00     Years: 20.00     Pack years: 40.00     Types: Cigarettes     Quit date: 1970     Years since quittin.0    Smokeless tobacco: Never   Substance Use Topics    Alcohol use: No     Alcohol/week: 0.0 standard drinks      Current Outpatient Medications   Medication Sig Dispense Refill    lisinopril-hydroCHLOROthiazide (PRINZIDE;ZESTORETIC) 20-25 MG per tablet Take 1 tablet by mouth 2 times daily 180 tablet 3    atorvastatin (LIPITOR) 80 MG tablet Take 1 tablet by mouth once daily 90 tablet 3    carvedilol (COREG) 12.5 MG tablet TAKE 1 TABLET BY MOUTH TWICE DAILY WITH MEALS 180 tablet 3    fluconazole (DIFLUCAN) 150 MG tablet Take 1 tablet by mouth daily 10 tablet 1    miconazole (MICOTIN) 2 % cream Apply topically 2 times daily. 1 each 1    nitroGLYCERIN (NITROSTAT) 0.4 MG SL tablet       vitamin C (ASCORBIC ACID) 500 MG tablet Take 1,000 mg by mouth daily      vitamin E 400 UNIT capsule Take 400 Units by mouth daily      magnesium (MAGNESIUM-OXIDE) 250 MG TABS tablet Take 250 mg by mouth daily      zinc 50 MG CAPS Take by mouth      TURMERIC PO Take by mouth      MULTIPLE VITAMIN PO Take by mouth daily      aspirin 81 MG tablet Take 81 mg by mouth daily. tamsulosin (FLOMAX) 0.4 MG capsule Take 0.4 mg by mouth daily. dutasteride (AVODART) 0.5 MG capsule Take 0.5 mg by mouth daily. Vitamin D (CHOLECALCIFEROL) 1000 UNITS CAPS capsule Take 10,000 Units by mouth daily. gabapentin (NEURONTIN) 300 MG capsule Take 1 capsule by mouth nightly for 180 days. Intended supply: 90 days 30 capsule 1     No current facility-administered medications for this visit.      Allergies   Allergen Reactions    Mobic [Meloxicam] Shortness Of Breath    Nsaids Shortness Of Breath       Health Maintenance   Topic Date Due Lipids  11/18/2022    Depression Screen  01/18/2023    Annual Wellness Visit (AWV)  01/19/2023    DTaP/Tdap/Td vaccine (2 - Td or Tdap) 11/18/2031    Flu vaccine  Completed    Shingles vaccine  Completed    Pneumococcal 65+ years Vaccine  Completed    COVID-19 Vaccine  Completed    Hepatitis C screen  Completed    Hepatitis A vaccine  Aged Out    Hib vaccine  Aged Out    Meningococcal (ACWY) vaccine  Aged Out       Subjective:     Review of Systems   Constitutional:  Negative for appetite change, diaphoresis and fatigue. HENT:  Negative for ear discharge and trouble swallowing. Eyes:  Negative for pain and discharge. Respiratory:  Negative for cough, shortness of breath and wheezing. Cardiovascular:  Negative for chest pain and palpitations. Gastrointestinal:  Negative for abdominal distention, blood in stool and diarrhea. Endocrine: Negative for polydipsia and polyphagia. Genitourinary:  Negative for difficulty urinating and frequency. Musculoskeletal:  Positive for back pain. Negative for gait problem, myalgias and neck pain. Skin:  Positive for rash. Negative for color change. Allergic/Immunologic: Negative for environmental allergies and food allergies. Neurological:  Negative for dizziness and headaches. Hematological:  Negative for adenopathy. Does not bruise/bleed easily. Psychiatric/Behavioral:  Negative for behavioral problems and sleep disturbance. Objective:     Physical Exam  Constitutional:       Appearance: He is well-developed. He is not diaphoretic. HENT:      Head: Normocephalic and atraumatic. Eyes:      General:         Right eye: No discharge. Left eye: No discharge. Extraocular Movements:      Right eye: Normal extraocular motion. Left eye: Normal extraocular motion. Conjunctiva/sclera: Conjunctivae normal.      Right eye: Right conjunctiva is not injected. Left eye: Left conjunctiva is not injected.    Neck:      Thyroid: No thyroid mass or thyromegaly. Vascular: No JVD. Cardiovascular:      Rate and Rhythm: Normal rate and regular rhythm. Heart sounds: No murmur heard. No friction rub. Pulmonary:      Effort: Pulmonary effort is normal. No tachypnea, bradypnea, accessory muscle usage or respiratory distress. Breath sounds: Normal breath sounds. No wheezing or rales. Abdominal:      General: Bowel sounds are normal. There is no distension. Palpations: Abdomen is soft. Tenderness: There is no abdominal tenderness. There is no rebound. Musculoskeletal:         General: No tenderness. Normal range of motion. Cervical back: Normal range of motion and neck supple. No edema or erythema. Lymphadenopathy:      Head:      Right side of head: No submental or submandibular adenopathy. Left side of head: No submental or submandibular adenopathy. Cervical: No cervical adenopathy. Skin:     General: Skin is warm. Coloration: Skin is not pale. Findings: No bruising, ecchymosis or rash. Neurological:      Mental Status: He is alert and oriented to person, place, and time. Cranial Nerves: No cranial nerve deficit. Sensory: No sensory deficit. Motor: No atrophy or abnormal muscle tone. Coordination: Coordination normal.   Psychiatric:         Mood and Affect: Mood is not anxious. Affect is not angry. Speech: Speech is not slurred. Behavior: Behavior normal. Behavior is not aggressive. Thought Content: Thought content does not include homicidal ideation. Cognition and Memory: Memory is not impaired. BP (!) 162/98 (Site: Left Upper Arm)   Wt 234 lb (106.1 kg)   BMI 33.58 kg/m²     Assessment:       Diagnosis Orders   1. Essential hypertension        2. Coronary artery disease involving native coronary artery of native heart with angina pectoris ([facility] [address]        3. Pulmonary asbestosis ([facility] [address]        4.  Screening for hyperlipidemia  Lipid

## 2022-10-27 ENCOUNTER — HOSPITAL ENCOUNTER (OUTPATIENT)
Age: 77
Setting detail: SPECIMEN
Discharge: HOME | End: 2022-10-27

## 2022-10-27 DIAGNOSIS — Z13.220 SCREENING FOR HYPERLIPIDEMIA: ICD-10-CM

## 2022-10-27 DIAGNOSIS — I10 ESSENTIAL HYPERTENSION: ICD-10-CM

## 2022-10-28 LAB
ANION GAP SERPL CALCULATED.3IONS-SCNC: 17 MMOL/L (ref 9–17)
BUN BLDV-MCNC: 15 MG/DL (ref 8–23)
CALCIUM SERPL-MCNC: 9 MG/DL (ref 8.6–10.4)
CHLORIDE BLD-SCNC: 96 MMOL/L (ref 98–107)
CHOLESTEROL/HDL RATIO: 3.5
CHOLESTEROL: 152 MG/DL
CO2: 24 MMOL/L (ref 20–31)
CREAT SERPL-MCNC: 0.76 MG/DL (ref 0.7–1.2)
GFR SERPL CREATININE-BSD FRML MDRD: >60 ML/MIN/1.73M2
GLUCOSE BLD-MCNC: 87 MG/DL (ref 70–99)
HDLC SERPL-MCNC: 44 MG/DL
LDL CHOLESTEROL: 86 MG/DL (ref 0–130)
POTASSIUM SERPL-SCNC: 4.1 MMOL/L (ref 3.7–5.3)
SODIUM BLD-SCNC: 137 MMOL/L (ref 135–144)
TRIGL SERPL-MCNC: 109 MG/DL

## 2022-12-16 ENCOUNTER — OFFICE VISIT (OUTPATIENT)
Dept: INTERNAL MEDICINE CLINIC | Age: 77
End: 2022-12-16

## 2022-12-16 VITALS
HEART RATE: 79 BPM | DIASTOLIC BLOOD PRESSURE: 78 MMHG | WEIGHT: 234 LBS | SYSTOLIC BLOOD PRESSURE: 134 MMHG | OXYGEN SATURATION: 100 % | BODY MASS INDEX: 33.5 KG/M2 | HEIGHT: 70 IN

## 2022-12-16 DIAGNOSIS — R53.83 MALAISE AND FATIGUE: ICD-10-CM

## 2022-12-16 DIAGNOSIS — I25.119 CORONARY ARTERY DISEASE INVOLVING NATIVE CORONARY ARTERY OF NATIVE HEART WITH ANGINA PECTORIS (HCC): Primary | ICD-10-CM

## 2022-12-16 DIAGNOSIS — R53.81 MALAISE AND FATIGUE: ICD-10-CM

## 2022-12-16 DIAGNOSIS — J61 PULMONARY ASBESTOSIS (HCC): ICD-10-CM

## 2022-12-16 DIAGNOSIS — M51.37 DEGENERATION OF LUMBAR OR LUMBOSACRAL INTERVERTEBRAL DISC: ICD-10-CM

## 2022-12-16 DIAGNOSIS — E78.2 MIXED DYSLIPIDEMIA: ICD-10-CM

## 2022-12-16 DIAGNOSIS — I10 ESSENTIAL HYPERTENSION: ICD-10-CM

## 2022-12-16 ASSESSMENT — ENCOUNTER SYMPTOMS
DIARRHEA: 0
EYE DISCHARGE: 0
EYE PAIN: 0
TROUBLE SWALLOWING: 0
ABDOMINAL DISTENTION: 0
SHORTNESS OF BREATH: 0
WHEEZING: 0
COLOR CHANGE: 0
BACK PAIN: 1
BLOOD IN STOOL: 0
COUGH: 0

## 2022-12-16 NOTE — PROGRESS NOTES
[address]  [place] [address]  Dept: [phone]  Dept Fax: [phone]    Suzie Todd is a 76 y.o. male who presents today for his medical conditions/complaintsas noted below.   Suzie Todd is c/o of   Chief Complaint   Patient presents with    Rash     Has gotten better since the weather got colder     Ambulatory Cardiac Monitoring     Follow up on results          HPI:     Rash intertrigo better with tt  Back is same seeing dr Livan Murphy        Hemoglobin A1C (%)   Date Value   07/03/2013 5.4             ( goal A1Cis < 7)   No results found for: LABMICR  LDL Cholesterol (mg/dL)   Date Value   10/27/2022 86   11/18/2021 71   07/23/2020 70     LDL Calculated (mg/dL)   Date Value   02/11/2016 213 (A)       (goal LDL is <100)   AST (U/L)   Date Value   11/18/2021 20     ALT (U/L)   Date Value   11/18/2021 22     BUN (mg/dL)   Date Value   10/27/2022 15     BP Readings from Last 3 Encounters:   12/16/22 134/78   10/25/22 (!) 162/98   09/22/22 126/80          (goal 120/80)    Past Medical History:   Diagnosis Date    Acute gouty arthropathy     Backache, unspecified     Cellulitis and abscess of upper arm and forearm     Degeneration of lumbar or lumbosacral intervertebral disc     Essential hypertension, benign     Obstructive sleep apnea on CPAP     Osteoarthrosis, unspecified whether generalized or localized, unspecified site     Other and unspecified hyperlipidemia     Other malaise and fatigue     Other psoriasis     Sleep apnea     Thoracic or lumbosacral neuritis or radiculitis, unspecified     Unspecified vitamin D deficiency       Past Surgical History:   Procedure Laterality Date    JOINT REPLACEMENT Right Oct.6, 2015    right knee    SHOULDER SURGERY Bilateral rotator cuff    TONSILLECTOMY AND ADENOIDECTOMY      TURP      VASECTOMY         Family History   Problem Relation Age of Onset    Cancer Father        Social History     Tobacco Use Smoking status: Former     Packs/day: 2.00     Years: 20.00     Pack years: 40.00     Types: Cigarettes     Quit date: 1970     Years since quittin.1    Smokeless tobacco: Never   Substance Use Topics    Alcohol use: No     Alcohol/week: 0.0 standard drinks      Current Outpatient Medications   Medication Sig Dispense Refill    lisinopril-hydroCHLOROthiazide (PRINZIDE;ZESTORETIC) 20-25 MG per tablet Take 1 tablet by mouth 2 times daily 180 tablet 3    atorvastatin (LIPITOR) 80 MG tablet Take 1 tablet by mouth once daily 90 tablet 3    carvedilol (COREG) 12.5 MG tablet TAKE 1 TABLET BY MOUTH TWICE DAILY WITH MEALS 180 tablet 3    fluconazole (DIFLUCAN) 150 MG tablet Take 1 tablet by mouth daily 10 tablet 1    amLODIPine (NORVASC) 5 MG tablet Take 1 tablet by mouth daily 30 tablet 3    miconazole (MICOTIN) 2 % cream Apply topically 2 times daily. 1 each 1    gabapentin (NEURONTIN) 300 MG capsule Take 1 capsule by mouth nightly for 180 days. Intended supply: 90 days 30 capsule 1    nitroGLYCERIN (NITROSTAT) 0.4 MG SL tablet       vitamin C (ASCORBIC ACID) 500 MG tablet Take 1,000 mg by mouth daily      vitamin E 400 UNIT capsule Take 400 Units by mouth daily      magnesium (MAGNESIUM-OXIDE) 250 MG TABS tablet Take 250 mg by mouth daily      zinc 50 MG CAPS Take by mouth      TURMERIC PO Take by mouth      MULTIPLE VITAMIN PO Take by mouth daily      aspirin 81 MG tablet Take 81 mg by mouth daily. tamsulosin (FLOMAX) 0.4 MG capsule Take 0.4 mg by mouth daily. dutasteride (AVODART) 0.5 MG capsule Take 0.5 mg by mouth daily. Vitamin D (CHOLECALCIFEROL) 1000 UNITS CAPS capsule Take 10,000 Units by mouth daily. No current facility-administered medications for this visit.      Allergies   Allergen Reactions    Mobic [Meloxicam] Shortness Of Breath    Nsaids Shortness Of Breath       Health Maintenance   Topic Date Due    Depression Screen  2023    Annual Wellness Visit (AWV)  2023 Lipids  10/27/2023    DTaP/Tdap/Td vaccine (2 - Td or Tdap) 11/18/2031    Flu vaccine  Completed    Shingles vaccine  Completed    Pneumococcal 65+ years Vaccine  Completed    COVID-19 Vaccine  Completed    Hepatitis C screen  Completed    Hepatitis A vaccine  Aged Out    Hib vaccine  Aged Out    Meningococcal (ACWY) vaccine  Aged Out       Subjective:     Review of Systems   Constitutional:  Negative for appetite change, diaphoresis and fatigue. HENT:  Negative for ear discharge and trouble swallowing. Eyes:  Negative for pain and discharge. Respiratory:  Negative for cough, shortness of breath and wheezing. Cardiovascular:  Negative for chest pain and palpitations. Gastrointestinal:  Negative for abdominal distention, blood in stool and diarrhea. Endocrine: Negative for polydipsia and polyphagia. Genitourinary:  Negative for difficulty urinating and frequency. Musculoskeletal:  Positive for arthralgias and back pain. Negative for gait problem, myalgias and neck pain. Skin:  Negative for color change and rash. Allergic/Immunologic: Negative for environmental allergies and food allergies. Neurological:  Negative for dizziness and headaches. Hematological:  Negative for adenopathy. Does not bruise/bleed easily. Psychiatric/Behavioral:  Negative for behavioral problems and sleep disturbance. Objective:     Physical Exam  Constitutional:       Appearance: He is well-developed. He is not diaphoretic. Comments: Obese     HENT:      Head: Normocephalic and atraumatic. Eyes:      General:         Right eye: No discharge. Left eye: No discharge. Extraocular Movements:      Right eye: Normal extraocular motion. Left eye: Normal extraocular motion. Conjunctiva/sclera: Conjunctivae normal.      Right eye: Right conjunctiva is not injected. Left eye: Left conjunctiva is not injected. Neck:      Thyroid: No thyroid mass or thyromegaly. Vascular: No JVD. Cardiovascular:      Rate and Rhythm: Normal rate and regular rhythm. Heart sounds: No murmur heard. No friction rub. Pulmonary:      Effort: Pulmonary effort is normal. No tachypnea, bradypnea, accessory muscle usage or respiratory distress. Breath sounds: Normal breath sounds. No wheezing or rales. Abdominal:      General: Bowel sounds are normal. There is no distension. Palpations: Abdomen is soft. Tenderness: There is no abdominal tenderness. There is no rebound. Musculoskeletal:         General: No tenderness. Normal range of motion. Cervical back: Normal range of motion and neck supple. No edema or erythema. Lymphadenopathy:      Head:      Right side of head: No submental or submandibular adenopathy. Left side of head: No submental or submandibular adenopathy. Cervical: No cervical adenopathy. Skin:     General: Skin is warm. Coloration: Skin is not pale. Findings: No bruising, ecchymosis or rash. Neurological:      Mental Status: He is alert and oriented to person, place, and time. Cranial Nerves: No cranial nerve deficit. Sensory: No sensory deficit. Motor: No atrophy or abnormal muscle tone. Coordination: Coordination normal.   Psychiatric:         Mood and Affect: Mood is not anxious. Affect is not angry. Speech: Speech is not slurred. Behavior: Behavior normal. Behavior is not aggressive. Thought Content: Thought content does not include homicidal ideation. Cognition and Memory: Memory is not impaired. /78   Pulse 79   Ht 5' 10\" (1.778 m)   Wt 234 lb (106.1 kg)   SpO2 100%   BMI 33.58 kg/m²     Assessment:       Diagnosis Orders   1. Coronary artery disease involving native coronary artery of native heart with angina pectoris ([address]        2. Pulmonary asbestosis ([facility] [address]        3. Degeneration of lumbar or lumbosacral intervertebral disc        4. Malaise and fatigue        5. Essential hypertension        6. Mixed dyslipidemia                  Plan:      Return in about 4 months (around 4/16/2023). No orders of the defined types were placed in this encounter. No orders of the defined types were placed in this encounter. Back pain seen dr Rocky Ames  Due to laminectomy  Htn is better since adding norvasc 5  Seen dr Carlito Prince  Holter done  Stress test done  Due to get clearance for him  Medically clear   Low risk      Patient given educational materials - see patient instructions. Discussed use, benefit, and side effects of prescribed medications. All patientquestions answered. Pt voiced understanding. Reviewed health maintenance. Instructedto continue current medications, diet and exercise. Patient agreed with treatmentplan. Follow up as directed. Please note that this chart was generated using voice recognition Dragon dictation software. Although every effort was made to ensure the accuracy of this automated transcription, some errors in transcription may have occurred.      Electronically signed by Al Pearl MD on 12/16/2022 at 1:31 PM

## 2022-12-16 NOTE — PROGRESS NOTES
Visit Information    Have you changed or started any medications since your last visit including any over-the-counter medicines, vitamins, or herbal medicines? no   Are you having any side effects from any of your medications? -  no  Have you stopped taking any of your medications? Is so, why? -  no    Have you seen any other physician or provider since your last visit? No  Have you had any other diagnostic tests since your last visit? No  Have you been seen in the emergency room and/or had an admission to a hospital since we last saw you? No  Have you had your routine dental cleaning in the past 6 months? no    Have you activated your [employer] account? If not, what are your barriers?  Yes     Patient Care Team:  Tara Wild MD as PCP - General (Internal Medicine)  Tara Wild MD as PCP - [facility]    Medical History Review  Past Medical, Family, and Social History reviewed and does contribute to the patient presenting condition    Health Maintenance   Topic Date Due    Depression Screen  01/18/2023    Annual Wellness Visit (AWV)  01/19/2023    Lipids  10/27/2023    DTaP/Tdap/Td vaccine (2 - Td or Tdap) 11/18/2031    Flu vaccine  Completed    Shingles vaccine  Completed    Pneumococcal 65+ years Vaccine  Completed    COVID-19 Vaccine  Completed    Hepatitis C screen  Completed    Hepatitis A vaccine  Aged Out    Hib vaccine  Aged Out    Meningococcal (ACWY) vaccine  Aged Out

## 2023-01-24 ENCOUNTER — OFFICE VISIT (OUTPATIENT)
Dept: INTERNAL MEDICINE CLINIC | Age: 78
End: 2023-01-24

## 2023-01-24 VITALS
WEIGHT: 237 LBS | BODY MASS INDEX: 33.18 KG/M2 | HEIGHT: 71 IN | DIASTOLIC BLOOD PRESSURE: 74 MMHG | SYSTOLIC BLOOD PRESSURE: 126 MMHG

## 2023-01-24 DIAGNOSIS — J61 PULMONARY ASBESTOSIS (HCC): ICD-10-CM

## 2023-01-24 DIAGNOSIS — I10 ESSENTIAL HYPERTENSION: ICD-10-CM

## 2023-01-24 DIAGNOSIS — M54.17 LUMBOSACRAL RADICULOPATHY: ICD-10-CM

## 2023-01-24 DIAGNOSIS — M51.37 DEGENERATION OF LUMBAR OR LUMBOSACRAL INTERVERTEBRAL DISC: ICD-10-CM

## 2023-01-24 DIAGNOSIS — I49.3 PVC (PREMATURE VENTRICULAR CONTRACTION): ICD-10-CM

## 2023-01-24 DIAGNOSIS — I25.119 CORONARY ARTERY DISEASE INVOLVING NATIVE CORONARY ARTERY OF NATIVE HEART WITH ANGINA PECTORIS (HCC): Primary | ICD-10-CM

## 2023-01-24 ASSESSMENT — ENCOUNTER SYMPTOMS
BLOOD IN STOOL: 0
TROUBLE SWALLOWING: 0
EYE DISCHARGE: 0
COLOR CHANGE: 0
COUGH: 0
SHORTNESS OF BREATH: 0
EYE PAIN: 0
ABDOMINAL DISTENTION: 0
WHEEZING: 0
DIARRHEA: 0
BACK PAIN: 1

## 2023-01-24 ASSESSMENT — PATIENT HEALTH QUESTIONNAIRE - PHQ9
SUM OF ALL RESPONSES TO PHQ QUESTIONS 1-9: 0
1. LITTLE INTEREST OR PLEASURE IN DOING THINGS: 0
SUM OF ALL RESPONSES TO PHQ9 QUESTIONS 1 & 2: 0
2. FEELING DOWN, DEPRESSED OR HOPELESS: 0
SUM OF ALL RESPONSES TO PHQ QUESTIONS 1-9: 0

## 2023-01-24 NOTE — PROGRESS NOTES
[address]  [place] [address]  Dept: [phone]  Dept Fax: [phone]    Nova Fernandez is a 76 y.o. male who presents today for his medical conditions/complaintsas noted below.   Nova Fernandez is c/o of   Chief Complaint   Patient presents with    Pre-op Exam         HPI:     Pre op for laminectomy lumbar 3 to 4  Dr Maite Diaz        Hemoglobin A1C (%)   Date Value   07/03/2013 5.4             ( goal A1Cis < 7)   No results found for: LABMICR  LDL Cholesterol (mg/dL)   Date Value   10/27/2022 86   11/18/2021 71   07/23/2020 70     LDL Calculated (mg/dL)   Date Value   02/11/2016 213 (A)       (goal LDL is <100)   AST (U/L)   Date Value   11/18/2021 20     ALT (U/L)   Date Value   11/18/2021 22     BUN (mg/dL)   Date Value   10/27/2022 15     BP Readings from Last 3 Encounters:   01/24/23 126/74   12/16/22 134/78   10/25/22 (!) 162/98          (goal 120/80)    Past Medical History:   Diagnosis Date    Acute gouty arthropathy     Backache, unspecified     Cellulitis and abscess of upper arm and forearm     Degeneration of lumbar or lumbosacral intervertebral disc     Essential hypertension, benign     Obstructive sleep apnea on CPAP     Osteoarthrosis, unspecified whether generalized or localized, unspecified site     Other and unspecified hyperlipidemia     Other malaise and fatigue     Other psoriasis     Sleep apnea     Thoracic or lumbosacral neuritis or radiculitis, unspecified     Unspecified vitamin D deficiency       Past Surgical History:   Procedure Laterality Date    JOINT REPLACEMENT Right Oct.6, 2015    right knee    SHOULDER SURGERY Bilateral rotator cuff    TONSILLECTOMY AND ADENOIDECTOMY      TURP      VASECTOMY         Family History   Problem Relation Age of Onset    Cancer Father        Social History     Tobacco Use    Smoking status: Former     Packs/day: 2.00     Years: 20.00     Pack years: 40.00     Types: Cigarettes     Quit date: 1970     Years since quittin.2    Smokeless tobacco: Never   Substance Use Topics    Alcohol use: No     Alcohol/week: 0.0 standard drinks      Current Outpatient Medications   Medication Sig Dispense Refill    verapamil (CALAN SR) 120 MG extended release tablet TAKE 1 TABLET BY MOUTH NIGHTLY      lisinopril-hydroCHLOROthiazide (PRINZIDE;ZESTORETIC) 20-25 MG per tablet Take 1 tablet by mouth 2 times daily 180 tablet 3    atorvastatin (LIPITOR) 80 MG tablet Take 1 tablet by mouth once daily 90 tablet 3    carvedilol (COREG) 12.5 MG tablet TAKE 1 TABLET BY MOUTH TWICE DAILY WITH MEALS 180 tablet 3    miconazole (MICOTIN) 2 % cream Apply topically 2 times daily. 1 each 1    nitroGLYCERIN (NITROSTAT) 0.4 MG SL tablet       vitamin C (ASCORBIC ACID) 500 MG tablet Take 1,000 mg by mouth daily      vitamin E 400 UNIT capsule Take 400 Units by mouth daily      magnesium (MAGNESIUM-OXIDE) 250 MG TABS tablet Take 250 mg by mouth daily      zinc 50 MG CAPS Take by mouth      TURMERIC PO Take by mouth      MULTIPLE VITAMIN PO Take by mouth daily      aspirin 81 MG tablet Take 81 mg by mouth daily. tamsulosin (FLOMAX) 0.4 MG capsule Take 0.4 mg by mouth daily. dutasteride (AVODART) 0.5 MG capsule Take 0.5 mg by mouth daily. Vitamin D (CHOLECALCIFEROL) 1000 UNITS CAPS capsule Take 10,000 Units by mouth daily. No current facility-administered medications for this visit.      Allergies   Allergen Reactions    Mobic [Meloxicam] Shortness Of Breath    Nsaids Shortness Of Breath       Health Maintenance   Topic Date Due    Depression Screen  2023    Annual Wellness Visit (AWV)  2023    Lipids  10/27/2023    DTaP/Tdap/Td vaccine (2 - Td or Tdap) 2031    Flu vaccine  Completed    Shingles vaccine  Completed    Pneumococcal 65+ years Vaccine  Completed    COVID-19 Vaccine  Completed    Hepatitis C screen  Completed    Hepatitis A vaccine  Aged Out    Hib vaccine  Aged Out    Meningococcal (ACWY) vaccine  Aged Out       Subjective:     Review of Systems   Constitutional:  Negative for appetite change, diaphoresis and fatigue. HENT:  Negative for ear discharge and trouble swallowing. Eyes:  Negative for pain and discharge. Respiratory:  Negative for cough, shortness of breath and wheezing. Cardiovascular:  Negative for chest pain and palpitations. Gastrointestinal:  Negative for abdominal distention, blood in stool and diarrhea. Endocrine: Negative for polydipsia and polyphagia. Genitourinary:  Negative for difficulty urinating and frequency. Musculoskeletal:  Positive for arthralgias and back pain. Negative for gait problem, myalgias and neck pain. Skin:  Negative for color change and rash. Allergic/Immunologic: Negative for environmental allergies and food allergies. Neurological:  Negative for dizziness and headaches. Hematological:  Negative for adenopathy. Does not bruise/bleed easily. Psychiatric/Behavioral:  Negative for behavioral problems and sleep disturbance. Objective:     Physical Exam  Constitutional:       Appearance: He is well-developed. He is not diaphoretic. HENT:      Head: Normocephalic and atraumatic. Eyes:      General:         Right eye: No discharge. Left eye: No discharge. Extraocular Movements:      Right eye: Normal extraocular motion. Left eye: Normal extraocular motion. Conjunctiva/sclera: Conjunctivae normal.      Right eye: Right conjunctiva is not injected. Left eye: Left conjunctiva is not injected. Neck:      Thyroid: No thyroid mass or thyromegaly. Vascular: No JVD. Cardiovascular:      Rate and Rhythm: Normal rate and regular rhythm. Heart sounds: No murmur heard. No friction rub. Pulmonary:      Effort: Pulmonary effort is normal. No tachypnea, bradypnea, accessory muscle usage or respiratory distress. Breath sounds: Normal breath sounds. No wheezing or rales.    Abdominal: General: Bowel sounds are normal. There is no distension. Palpations: Abdomen is soft. Tenderness: There is no abdominal tenderness. There is no rebound. Musculoskeletal:         General: No tenderness. Normal range of motion. Cervical back: Normal range of motion and neck supple. No edema or erythema. Lymphadenopathy:      Head:      Right side of head: No submental or submandibular adenopathy. Left side of head: No submental or submandibular adenopathy. Cervical: No cervical adenopathy. Skin:     General: Skin is warm. Coloration: Skin is not pale. Findings: No bruising, ecchymosis or rash. Neurological:      Mental Status: He is alert and oriented to person, place, and time. Cranial Nerves: No cranial nerve deficit. Sensory: No sensory deficit. Motor: No atrophy or abnormal muscle tone. Coordination: Coordination normal.   Psychiatric:         Mood and Affect: Mood is not anxious. Affect is not angry. Speech: Speech is not slurred. Behavior: Behavior normal. Behavior is not aggressive. Thought Content: Thought content does not include homicidal ideation. Cognition and Memory: Memory is not impaired. /74 (Site: Left Upper Arm)   Ht 5' 10.5\" (1.791 m)   Wt 237 lb (107.5 kg)   BMI 33.53 kg/m²     Assessment:       Diagnosis Orders   1. Coronary artery disease involving native coronary artery of native heart with angina pectoris ([address]        2. Pulmonary asbestosis ([address]        3. Degeneration of lumbar or lumbosacral intervertebral disc        4. Essential hypertension        5. PVC (premature ventricular contraction)        6. Lumbosacral radiculopathy                  Plan:      No follow-ups on file. No orders of the defined types were placed in this encounter. No orders of the defined types were placed in this encounter.    Pre op for laminectomy lumbar 3 to 4  Dr Cm Jazzy    No stent no tia or cva  No chest pain on exertional  No schneider  Meds rev  Clear for surg   Inter risk     Patient given educational materials - see patient instructions. Discussed use, benefit, and side effects of prescribed medications. All patientquestions answered. Pt voiced understanding. Reviewed health maintenance. Instructedto continue current medications, diet and exercise. Patient agreed with treatmentplan. Follow up as directed. Please note that this chart was generated using voice recognition Dragon dictation software. Although every effort was made to ensure the accuracy of this automated transcription, some errors in transcription may have occurred.      Electronically signed by Halina Swain MD on 1/24/2023 at 8:41 AM

## 2023-01-24 NOTE — PROGRESS NOTES
Visit Information    Have you changed or started any medications since your last visit including any over-the-counter medicines, vitamins, or herbal medicines? no   Are you having any side effects from any of your medications? -  no  Have you stopped taking any of your medications? Is so, why? -  no    Have you seen any other physician or provider since your last visit? Yes - Records Obtained  Have you had any other diagnostic tests since your last visit? Yes - Records Obtained  Have you been seen in the emergency room and/or had an admission to a hospital since we last saw you? No  Have you had your routine dental cleaning in the past 6 months? no    Have you activated your [employer] account? If not, what are your barriers?  Yes     Patient Care Team:  Kandi Smith MD as PCP - General (Internal Medicine)  Kandi Smith MD as PCP - [facility]    Medical History Review  Past Medical, Family, and Social History reviewed and does not contribute to the patient presenting condition    Health Maintenance   Topic Date Due    Depression Screen  01/18/2023    Annual Wellness Visit (AWV)  01/19/2023    Lipids  10/27/2023    DTaP/Tdap/Td vaccine (2 - Td or Tdap) 11/18/2031    Flu vaccine  Completed    Shingles vaccine  Completed    Pneumococcal 65+ years Vaccine  Completed    COVID-19 Vaccine  Completed    Hepatitis C screen  Completed    Hepatitis A vaccine  Aged Out    Hib vaccine  Aged Out    Meningococcal (ACWY) vaccine  Aged Out

## 2023-04-03 ENCOUNTER — OFFICE VISIT (OUTPATIENT)
Dept: INTERNAL MEDICINE CLINIC | Age: 78
End: 2023-04-03
Payer: MEDICARE

## 2023-04-03 VITALS
BODY MASS INDEX: 34.36 KG/M2 | SYSTOLIC BLOOD PRESSURE: 130 MMHG | HEART RATE: 65 BPM | DIASTOLIC BLOOD PRESSURE: 84 MMHG | WEIGHT: 240 LBS | OXYGEN SATURATION: 98 % | HEIGHT: 70 IN

## 2023-04-03 DIAGNOSIS — J61 PULMONARY ASBESTOSIS (HCC): ICD-10-CM

## 2023-04-03 DIAGNOSIS — I25.119 CORONARY ARTERY DISEASE INVOLVING NATIVE CORONARY ARTERY OF NATIVE HEART WITH ANGINA PECTORIS (HCC): Primary | ICD-10-CM

## 2023-04-03 DIAGNOSIS — Z00.00 MEDICARE ANNUAL WELLNESS VISIT, SUBSEQUENT: ICD-10-CM

## 2023-04-03 PROCEDURE — 3079F DIAST BP 80-89 MM HG: CPT | Performed by: INTERNAL MEDICINE

## 2023-04-03 PROCEDURE — G0439 PPPS, SUBSEQ VISIT: HCPCS | Performed by: INTERNAL MEDICINE

## 2023-04-03 PROCEDURE — 3075F SYST BP GE 130 - 139MM HG: CPT | Performed by: INTERNAL MEDICINE

## 2023-04-03 PROCEDURE — 1123F ACP DISCUSS/DSCN MKR DOCD: CPT | Performed by: INTERNAL MEDICINE

## 2023-04-03 SDOH — ECONOMIC STABILITY: HOUSING INSECURITY
IN THE LAST 12 MONTHS, WAS THERE A TIME WHEN YOU DID NOT HAVE A STEADY PLACE TO SLEEP OR SLEPT IN A SHELTER (INCLUDING NOW)?: NO

## 2023-04-03 SDOH — ECONOMIC STABILITY: FOOD INSECURITY: WITHIN THE PAST 12 MONTHS, THE FOOD YOU BOUGHT JUST DIDN'T LAST AND YOU DIDN'T HAVE MONEY TO GET MORE.: NEVER TRUE

## 2023-04-03 SDOH — ECONOMIC STABILITY: FOOD INSECURITY: WITHIN THE PAST 12 MONTHS, YOU WORRIED THAT YOUR FOOD WOULD RUN OUT BEFORE YOU GOT MONEY TO BUY MORE.: NEVER TRUE

## 2023-04-03 SDOH — ECONOMIC STABILITY: INCOME INSECURITY: HOW HARD IS IT FOR YOU TO PAY FOR THE VERY BASICS LIKE FOOD, HOUSING, MEDICAL CARE, AND HEATING?: NOT HARD AT ALL

## 2023-04-03 ASSESSMENT — PATIENT HEALTH QUESTIONNAIRE - PHQ9
1. LITTLE INTEREST OR PLEASURE IN DOING THINGS: 0
SUM OF ALL RESPONSES TO PHQ9 QUESTIONS 1 & 2: 0
2. FEELING DOWN, DEPRESSED OR HOPELESS: 0
SUM OF ALL RESPONSES TO PHQ QUESTIONS 1-9: 0

## 2023-04-03 NOTE — PATIENT INSTRUCTIONS
Learning About Being Active as an Older Adult  Why is being active important as you get older? Being active is one of the best things you can do for your health. And it's never too late to start. Being active--or getting active, if you aren't already--has definite benefits. It can:  Give you more energy,  Keep your mind sharp. Improve balance to reduce your risk of falls. Help you manage chronic illness with fewer medicines. No matter how old you are, how fit you are, or what health problems you have, there is a form of activity that will work for you. And the more physical activity you can do, the better your overall health will be. What kinds of activity can help you stay healthy? Being more active will make your daily activities easier. Physical activity includes planned exercise and things you do in daily life. There are four types of activity:  Aerobic. Doing aerobic activity makes your heart and lungs strong. Includes walking, dancing, and gardening. Aim for at least 2½ hours spread throughout the week. It improves your energy and can help you sleep better. Muscle-strengthening. This type of activity can help maintain muscle and strengthen bones. Includes climbing stairs, using resistance bands, and lifting or carrying heavy loads. Aim for at least twice a week. It can help protect the knees and other joints. Stretching. Stretching gives you better range of motion in joints and muscles. Includes upper arm stretches, calf stretches, and gentle yoga. Aim for at least twice a week, preferably after your muscles are warmed up from other activities. It can help you function better in daily life. Balancing. This helps you stay coordinated and have good posture. Includes heel-to-toe walking, froy chi, and certain types of yoga. Aim for at least 3 days a week. It can reduce your risk of falling.   Even if you have a hard time meeting the recommendations, it's better to be more active

## 2023-04-03 NOTE — PROGRESS NOTES
Visit Information    Have you changed or started any medications since your last visit including any over-the-counter medicines, vitamins, or herbal medicines? no   Are you having any side effects from any of your medications? -  no  Have you stopped taking any of your medications? Is so, why? -  no    Have you seen any other physician or provider since your last visit? No  Have you had any other diagnostic tests since your last visit? No  Have you been seen in the emergency room and/or had an admission to a hospital since we last saw you? No  Have you had your routine dental cleaning in the past 6 months? no    Have you activated your [employer] account? If not, what are your barriers?  Yes     Patient Care Team:  Otilia Tan MD as PCP - General (Internal Medicine)  Otilia Tan MD as PCP - Empaneled Provider    Medical History Review  Past Medical, Family, and Social History reviewed and does not contribute to the patient presenting condition    Health Maintenance   Topic Date Due    Annual Wellness Visit (AWV)  01/19/2023    Lipids  10/27/2023    Depression Screen  01/24/2024    DTaP/Tdap/Td vaccine (2 - Td or Tdap) 11/18/2031    Flu vaccine  Completed    Shingles vaccine  Completed    Pneumococcal 65+ years Vaccine  Completed    COVID-19 Vaccine  Completed    Hepatitis C screen  Completed    Hepatitis A vaccine  Aged Out    Hib vaccine  Aged Out    Meningococcal (ACWY) vaccine  Aged Out
normal  ENT: tympanic membrane, external ear and ear canal normal bilaterally, nose without deformity, nasal mucosa and turbinates normal without polyps  Neck: supple and non-tender without mass, no thyromegaly or thyroid nodules, no cervical lymphadenopathy  Pulmonary/Chest: clear to auscultation bilaterally- no wheezes, rales or rhonchi, normal air movement, no respiratory distress  Cardiovascular: normal rate, regular rhythm, normal S1 and S2, no murmurs, rubs, clicks, or gallops, distal pulses intact, no carotid bruits  Abdomen: soft, non-tender, non-distended, normal bowel sounds, no masses or organomegaly  Musculoskeletal: normal range of motion, no joint swelling, deformity or tenderness  Neurologic: reflexes normal and symmetric, no cranial nerve deficit, gait, coordination and speech normal       Allergies   Allergen Reactions    Mobic [Meloxicam] Shortness Of Breath    Nsaids Shortness Of Breath    Amlodipine Dizziness or Vertigo     Prior to Visit Medications    Medication Sig Taking? Authorizing Provider   verapamil (CALAN SR) 120 MG extended release tablet TAKE 1 TABLET BY MOUTH NIGHTLY Yes Historical Provider, MD   lisinopril-hydroCHLOROthiazide (PRINZIDE;ZESTORETIC) 20-25 MG per tablet Take 1 tablet by mouth 2 times daily Yes Amado Gudino MD   atorvastatin (LIPITOR) 80 MG tablet Take 1 tablet by mouth once daily Yes Amado Gudino MD   carvedilol (COREG) 12.5 MG tablet TAKE 1 TABLET BY MOUTH TWICE DAILY WITH MEALS Yes Amado Gudino MD   miconazole (MICOTIN) 2 % cream Apply topically 2 times daily.  Yes Amado Gudino MD   nitroGLYCERIN (NITROSTAT) 0.4 MG SL tablet  Yes Historical Provider, MD   vitamin C (ASCORBIC ACID) 500 MG tablet Take 2 tablets by mouth daily Yes Historical Provider, MD   vitamin E 400 UNIT capsule Take 1 capsule by mouth daily Yes Historical Provider, MD   magnesium (MAGNESIUM-OXIDE) 250 MG TABS tablet Take 1 tablet by mouth daily Yes Historical Provider, MD   zinc

## 2023-04-19 ENCOUNTER — HOSPITAL ENCOUNTER (OUTPATIENT)
Dept: PHYSICAL THERAPY | Age: 78
Setting detail: THERAPIES SERIES
Discharge: HOME | End: 2023-04-19
Payer: MEDICARE

## 2023-04-19 PROCEDURE — 97110 THERAPEUTIC EXERCISES: CPT

## 2023-04-19 NOTE — PROGRESS NOTES
[] [facility] ([facility]) - [facility] & Therapy  [address]  [place]: [phone]   F: [phone]    Physical Therapy Daily Treatment Note      Date:  2023  Patient Name:  Laura Martinez    :  [birth year]  MRN: [id number]  Physician: Kodi Bonilla MD                                    Insurance: [employer] [place] $49 copay Med Necessity  Medical Diagnosis: spinat stenosis lumbar M48.061                      Rehab Codes: low back pain M54.5, hip weakness R53.1  Onset Date: referral 3/31/23; surgery ~ 2months ago at time of PT eval                       Next 's appt.: ~23  Visit Count:                                 Cancel/No Show: 0/0     Subjective:  Patient noted follow thru with HEP since evaluation strained L shoulder and has Hx of RTC, patient continues to report limited standing and walking tolerance of 5 to 10 min. Pain:  [x] Yes  [] No Location: Back  Pain Rating: (0-10 scale) 5/10  Pain altered Tx:  [] No  [x] Yes  Action: Lx Extension  Comments:    Objective:  Modalities:   Precautions: Patient strained L shld with HEP req asst to perform stretches  Exercises:all exercises below given for initial HEP at al  Exercise Reps/ Time Weight/ Level Comments   SKTC 3 x 10\" (B)       LTR 15x       Piriformis stretch 3 x 10\"       Neural tension glide 5x (B)       HS Stretch with Asst  5x20\"       Pelvic Tilt/Abdominal Bracing 10x10\"  Use of towel for reinforcment of tech   LE Dead Bug 10x  With AB Bracing   Bridge 10x     Doorway Stretch 3x15\"           Other:    Specific Instructions for next treatment:      Assessment:Patient limited with standing, walking, sleeping/laying on back; limited with lumbar mobility, hip strength, gait pattern s/p lumbar surgical procedure ~2 months ago. Patient would continue to benefit from skilled physical therapy services in order to: work on improving general function, ROM, and hip strength  [x] Progressing toward goals.     [] No

## 2023-04-21 ENCOUNTER — APPOINTMENT (OUTPATIENT)
Dept: PHYSICAL THERAPY | Age: 78
End: 2023-04-21
Payer: MEDICARE

## 2023-04-26 ENCOUNTER — HOSPITAL ENCOUNTER (OUTPATIENT)
Dept: PHYSICAL THERAPY | Age: 78
Setting detail: THERAPIES SERIES
Discharge: HOME | End: 2023-04-26
Payer: MEDICARE

## 2023-04-26 PROCEDURE — 97110 THERAPEUTIC EXERCISES: CPT

## 2023-04-26 NOTE — PROGRESS NOTES
[x] [facility] ([facility]) - [facility] & Therapy  [address]  [place]: [phone]   F: [phone]    Physical Therapy Daily Treatment Note      Date:  2023  Patient Name:  Briana Lopez    :  [birth year]  MRN: [id number]  Physician: Elisabeth Hanley MD                                    Insurance: [employer] [place] $27 copay Med Necessity  Medical Diagnosis: spinat stenosis lumbar M48.061                      Rehab Codes: low back pain M54.5, hip weakness R53.1  Onset Date: referral 3/31/23; surgery ~ 2months ago at time of PT eval                       Next 's appt.: ~23  Visit Count: 3/12                                Cancel/No Show: 0/0     Subjective:    Pain:  [x] Yes  [] No Location: Back  Pain Rating: (0-10 scale) 3/10  Pain altered Tx:  [x] No  [] Yes  Action:   Comments: Patient reports his shoulder pain has subsided, but feels a lot of tension when he extends his elbow. Patient notes a large bruise to distal bicep area with a large knot. Patient states his back pain was better yesterday, but it still increases when he's up and walking and really limits his ability to complete day to day tasks without pain.        Objective:  Modalities:   Precautions: Patient strained L shld with HEP - req asst to perform stretches  Exercises:all exercises below given for initial HEP at eval  Exercise Reps/ Time Weight/ Level Completed Today Comments   SKTC 3 x 30\" (B)   x  Passive    LTR 15x   x     Piriformis stretch 3 x 30\"   x  Passive   Neural tension glide 5x (B)        HS Stretch with Asst  5x20\"   x     Pelvic Tilt/Abdominal Bracing 10x10\"  x Use of towel for reinforcment of tech   LE Dead Bug 15x  x With AB Bracing   Bridge 2x10  x    Doorway Stretch 3x15\"             Standing back extension at wall 10x5''  x    Seated thoracic extension with half foam roll 10x5''  x           Other:    Specific Instructions for next treatment: B hip strength, core engagement/strengthening, more

## 2023-04-28 ENCOUNTER — HOSPITAL ENCOUNTER (OUTPATIENT)
Dept: PHYSICAL THERAPY | Age: 78
Setting detail: THERAPIES SERIES
Discharge: HOME | End: 2023-04-28
Payer: MEDICARE

## 2023-04-28 PROCEDURE — 97110 THERAPEUTIC EXERCISES: CPT

## 2023-04-28 PROCEDURE — 97112 NEUROMUSCULAR REEDUCATION: CPT

## 2023-04-28 NOTE — PROGRESS NOTES
Pt. Education:  [x] Yes  [] No  [] Reviewed Prior HEP/Ed  Method of Education: [x] Verbal  [] Demo  [] Written  Comprehension of Education:  [x] Verbalizes understanding. [] Demonstrates understanding. [x] Needs review. [] Demonstrates/verbalizes HEP/Ed previously given. Plan: [x] Continue per plan of care.    [] Other:      Treatment Charges: Mins Units   []  Modalities     [x]  Ther Exercise 45 3   []  Manual Therapy     []  Ther Activities     []  Aquatics     []  Neuromuscular     [] Vasocompression     [] Gait Training     [] Dry needling        [] 1 or 2 muscles        [] 3 or more muscles     []  Other     Total Treatment time 45 3     Time 0915           Time Out: 1000    Electronically signed by:  Dandre Arreola, PT

## 2023-05-03 ENCOUNTER — HOSPITAL ENCOUNTER (OUTPATIENT)
Dept: PHYSICAL THERAPY | Age: 78
Setting detail: THERAPIES SERIES
Discharge: HOME | End: 2023-05-03
Payer: MEDICARE

## 2023-05-03 PROCEDURE — 97110 THERAPEUTIC EXERCISES: CPT

## 2023-05-03 NOTE — FLOWSHEET NOTE
[x] [facility] ([facility]) - [facility] & Therapy  [address]  [place]: [phone]   F: [phone]    Physical Therapy Daily Treatment Note      Date:  5/3/2023  Patient Name:  Chalino Tejeda    :    MRN: [id number]  Physician: Roni Harris MD                                    Insurance: [employer] [place] $52 copay Med Necessity  Medical Diagnosis: spinat stenosis lumbar M48.061                      Rehab Codes: low back pain M54.5, hip weakness R53.1  Onset Date: referral 3/31/23; surgery ~ 2months ago at time of PT eval                       Next 's appt.: ~23  Visit Count:                                 Cancel/No Show: 0/0     Subjective:    Pain:  [x] Yes  [] No Location: Back  Pain Rating: (0-10 scale) 5/10  Pain altered Tx:  [x] No  [] Yes  Action:   Comments: Patient reports today that pain is a little more worse today. Reported that he continues to fight a fair amount of stiffness through LB and intermittent radicular pain. Patient denies any radicular pain upon arrival today. Objective:  Modalities:   Precautions: Patient strained L shld with HEP - req asst to perform stretches  Exercises:all exercises below given for initial HEP at al  Exercise Reps/ Time Weight/ Level Completed Today Comments   SKTC 3 x 30\" (B)   x  Passive    LTR 15x   x     Piriformis stretch 3 x 30\"   x  Passive   Neural tension glide 5x (B)        HS Stretch with Asst  5x20\"   x     Pelvic Tilt/Abdominal Bracing 10x10\"  x Use of towel for reinforcment of tech   LE Dead Bug 15x  x With AB Bracing   Bridge 2x10  x    Side-Lying Clamshells 10x2 ea  x    Doorway Stretch 3x15\"             Standing back extension at wall 10x5'' Yellow pball x    Seated thoracic extension with half foam roll 10x5''             Other:    Specific Instructions for next treatment: B hip strength, core engagement/strengthening, more extension based activity, standing tolerance.        Assessment:

## 2023-05-05 ENCOUNTER — HOSPITAL ENCOUNTER (OUTPATIENT)
Dept: PHYSICAL THERAPY | Age: 78
Setting detail: THERAPIES SERIES
Discharge: HOME | End: 2023-05-05
Payer: MEDICARE

## 2023-05-05 PROCEDURE — 97110 THERAPEUTIC EXERCISES: CPT

## 2023-05-05 NOTE — FLOWSHEET NOTE
Other:    Specific Instructions for next treatment: B hip strength, core engagement/strengthening, more extension based activity, standing tolerance. Assessment:     [x] Progressing toward goals. Began session with encouraging patient to discuss medications with doctor and not stop taking the statin medication unless doctor ordered. Patient not receptive to this and states he is going to do it anyway. Continued with mat level stretching, followed by core and hip strengthening exercises. Patient demonstrates ability to complete dead bugs correctly without assistance or cueing. Added nustep at end of session for endurance and gentle lumbar mobility. [] No change. [] Other:    [] Patient would continue to benefit from skilled physical therapy services in order to: work on improving general function, ROM, and hip strength    Goals:  STG: (to be met in 8 treatments)  ? Pain: Improved pain levels to 2-3/10 to allow better general function  ? ROM: Improved flexion to 2/3rds distal from patella to ankles to allow better ability to tie shoes, improved extension to 10 degrees  ? Strength: hip strength testing 4/5 or better to allow improved gait pattern  ? Function: standing tolerance 15 minutes to allow better tolerance of meal prep, walks 15 minutes to allow better tolerance of grocery shopping  Independent with Home Exercise Programs     LTG: (to be met in 12 treatments)  ? Pain: Improved pain levels to 0-1/10 to allow better general function  ? ROM: Improved flexion to ankles to allow better ability to tie shoes, improved extension to 15 degrees  ? Strength: hip strength testing 4+/5 or better to allow improved gait pattern  ? Function: standing tolerance 25 minutes to allow better tolerance of meal prep, walks 20 minutes to allow better tolerance of grocery shopping  Independent with Home Exercise Programs        Patient goals:to be able to walk one mild       Pt.  Education:  [x] Yes  [] No  [x] Reviewed

## 2023-05-08 ENCOUNTER — HOSPITAL ENCOUNTER (OUTPATIENT)
Dept: PHYSICAL THERAPY | Age: 78
Setting detail: THERAPIES SERIES
Discharge: HOME | End: 2023-05-08
Payer: MEDICARE

## 2023-05-08 PROCEDURE — 97112 NEUROMUSCULAR REEDUCATION: CPT

## 2023-05-08 PROCEDURE — 97110 THERAPEUTIC EXERCISES: CPT

## 2023-05-08 NOTE — FLOWSHEET NOTE
[x] [facility] ([facility]) - [facility] & Therapy  [address]  [place]: [phone]   F: [phone]    Physical Therapy Daily Treatment Note/PROGRESS NOTE      Date:  2023  Patient Name:  Laura Martinez    :    MRN: [id number]  Physician: Kodi Bonilla MD                                    Insurance: [employer] [place] $68 copay Med Necessity  Medical Diagnosis: spinat stenosis lumbar M48.061                      Rehab Codes: low back pain M54.5, hip weakness R53.1  Onset Date: referral 3/31/23; surgery ~ 2months ago at time of PT eval                       Next Dr.'s appt.: ~23  Visit Count:                                 Cancel/No Show: 0/0  Reporting period 23- 23     Subjective:    Pain:  [x] Yes  [] No Location: Back  Pain Rating: (0-10 scale) 5/10  Pain altered Tx:  [x] No  [] Yes  Action:   Comments: Patient with no significant change in (L) LE symptoms since initiation of therapy. Stated that he got no significant relief of the numbness since surgical intervention in his (L) foot and (L) tibial area, but he that he has been able to tolerate current exercise program without an increase of symptoms. Patient has started to notice presence of (L) sided \"scatica\" symptoms in the last week into the (L) lateral lumbar spine. Has also notice more and more fatigue which he attributes to use of statins. He has stated previously that he was going to discontinue these on his own- and we discussed calling physician prior to changing meds. Patient has been working on LE strengthening, hip/trunk stabilization exercises, general lumbar mobility program, and postural decompression with better posture and positioning. He does have some improvement with his hamstring and back mobility, but is still limited with complaints of pain and numbness into the (L) LE and foot.   Standing tolerance remains 10 minutes, walking tolerance remains 10

## 2023-05-10 ENCOUNTER — HOSPITAL ENCOUNTER (OUTPATIENT)
Dept: PHYSICAL THERAPY | Age: 78
Setting detail: THERAPIES SERIES
Discharge: HOME | End: 2023-05-10
Payer: MEDICARE

## 2023-05-10 NOTE — FLOWSHEET NOTE
[x] [facility] ([facility]) - [facility] & Therapy  [address]  [place]: [phone]   F: [phone]     Physical Therapy Cancel/No Show note    Date: 5/10/2023  Patient: Bee Olivas  :   MRN: [id number]    Visit Count:                                 Cancel/No Show: 1/0    For today's appointment patient:    [x]  Cancelled    [] Rescheduled appointment    [] No-show     Reason given by patient:    [x]  Patient ill    []  Conflicting appointment    [] No transportation      [] Conflict with work    [] No reason given    [] Weather related    [] COVID-19    [] Other:      Comments:        [x] Next appointment was confirmed    Electronically signed by: Pete Forde PT

## 2023-05-12 ENCOUNTER — HOSPITAL ENCOUNTER (OUTPATIENT)
Dept: PHYSICAL THERAPY | Age: 78
Setting detail: THERAPIES SERIES
End: 2023-05-12
Payer: MEDICARE

## 2023-05-17 ENCOUNTER — HOSPITAL ENCOUNTER (OUTPATIENT)
Dept: PHYSICAL THERAPY | Age: 78
Setting detail: THERAPIES SERIES
Discharge: HOME | End: 2023-05-17
Payer: MEDICARE

## 2023-05-17 PROCEDURE — 97140 MANUAL THERAPY 1/> REGIONS: CPT

## 2023-05-17 PROCEDURE — 97110 THERAPEUTIC EXERCISES: CPT

## 2023-05-17 NOTE — FLOWSHEET NOTE
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 States back pain has increased over the last couple weeks and he hasnt been able to do much because of it. Patient also states he is not doing his HEP either.       Objective:  Modalities:   Precautions: Patient strained L shld with HEP - req asst to perform stretches  Exercises:all exercises below given for initial HEP at [facility]  Exercise Reps/ Time Weight/ Level Completed Today Comments   Nustep 5' Lvl 4 x Goal to keep avg 65-70 SPM          SKTC 3 x 30\" (B)   x  Passive    LTR 15x   x     Piriformis stretch 3 x 30\"   x  Passive   Neural tension glide 5x (B)        HS Stretch with Asst  5x20\"   x     Pelvic Tilt/Abdominal Bracing 10x10\"   Use of towel for reinforcment of tech   LE Dead Bug 15x   With AB Bracing   Bridge 2x10 Yellow x    Side-Lying Clamshells 10x2 ea red x    Hip ER 15x red     SLR 15x ea 2# x    BKFO 20x ea Yellow x           Doorway Stretch 3x15\"      (L) SG door 15x             Standing back extension at wall 10x5'' Yellow

## 2023-05-19 ENCOUNTER — HOSPITAL ENCOUNTER (OUTPATIENT)
Dept: PHYSICAL THERAPY | Age: 78
Setting detail: THERAPIES SERIES
Discharge: HOME | End: 2023-05-19
Payer: MEDICARE

## 2023-05-19 PROCEDURE — 97110 THERAPEUTIC EXERCISES: CPT

## 2023-05-19 PROCEDURE — 97112 NEUROMUSCULAR REEDUCATION: CPT

## 2023-07-10 ENCOUNTER — OFFICE VISIT (OUTPATIENT)
Dept: INTERNAL MEDICINE CLINIC | Age: 78
End: 2023-07-10
Payer: MEDICARE

## 2023-07-10 VITALS
DIASTOLIC BLOOD PRESSURE: 70 MMHG | WEIGHT: 239 LBS | BODY MASS INDEX: 34.29 KG/M2 | HEART RATE: 64 BPM | OXYGEN SATURATION: 95 % | SYSTOLIC BLOOD PRESSURE: 122 MMHG

## 2023-07-10 DIAGNOSIS — R53.83 MALAISE AND FATIGUE: ICD-10-CM

## 2023-07-10 DIAGNOSIS — E55.9 VITAMIN D DEFICIENCY: ICD-10-CM

## 2023-07-10 DIAGNOSIS — I25.119 CORONARY ARTERY DISEASE INVOLVING NATIVE CORONARY ARTERY OF NATIVE HEART WITH ANGINA PECTORIS (HCC): Primary | ICD-10-CM

## 2023-07-10 DIAGNOSIS — J61 PULMONARY ASBESTOSIS (HCC): ICD-10-CM

## 2023-07-10 DIAGNOSIS — R53.81 MALAISE AND FATIGUE: ICD-10-CM

## 2023-07-10 DIAGNOSIS — E78.2 MIXED DYSLIPIDEMIA: ICD-10-CM

## 2023-07-10 DIAGNOSIS — M51.37 DEGENERATION OF LUMBAR OR LUMBOSACRAL INTERVERTEBRAL DISC: ICD-10-CM

## 2023-07-10 DIAGNOSIS — I10 ESSENTIAL HYPERTENSION: ICD-10-CM

## 2023-07-10 DIAGNOSIS — R06.09 DOE (DYSPNEA ON EXERTION): ICD-10-CM

## 2023-07-10 PROCEDURE — 3074F SYST BP LT 130 MM HG: CPT | Performed by: INTERNAL MEDICINE

## 2023-07-10 PROCEDURE — 1123F ACP DISCUSS/DSCN MKR DOCD: CPT | Performed by: INTERNAL MEDICINE

## 2023-07-10 PROCEDURE — 99214 OFFICE O/P EST MOD 30 MIN: CPT | Performed by: INTERNAL MEDICINE

## 2023-07-10 PROCEDURE — 3078F DIAST BP <80 MM HG: CPT | Performed by: INTERNAL MEDICINE

## 2023-07-10 ASSESSMENT — ENCOUNTER SYMPTOMS
COUGH: 0
SHORTNESS OF BREATH: 1
WHEEZING: 0
ABDOMINAL DISTENTION: 0
EYE DISCHARGE: 0
DIARRHEA: 0
BLOOD IN STOOL: 0
EYE PAIN: 0
COLOR CHANGE: 0
TROUBLE SWALLOWING: 0

## 2023-07-10 NOTE — PROGRESS NOTES
Visit Information    Have you changed or started any medications since your last visit including any over-the-counter medicines, vitamins, or herbal medicines? no   Are you having any side effects from any of your medications? -  no  Have you stopped taking any of your medications? Is so, why? -  no    Have you seen any other physician or provider since your last visit? Yes - Records Obtained  Have you had any other diagnostic tests since your last visit? Yes - Records Obtained  Have you been seen in the emergency room and/or had an admission to a hospital since we last saw you? No  Have you had your routine dental cleaning in the past 6 months? no    Have you activated your [employer] account? If not, what are your barriers?  Yes     Patient Care Team:  Javier Ramirez MD as PCP - General (Internal Medicine)  Javier Ramirez MD as PCP - Empaneled Provider    Medical History Review  Past Medical, Family, and Social History reviewed and does contribute to the patient presenting condition    Health Maintenance   Topic Date Due    Flu vaccine (1) 08/01/2023    Lipids  10/27/2023    Depression Screen  04/03/2024    Annual Wellness Visit (AWV)  04/03/2024    DTaP/Tdap/Td vaccine (2 - Td or Tdap) 11/18/2031    Shingles vaccine  Completed    Pneumococcal 65+ years Vaccine  Completed    COVID-19 Vaccine  Completed    Hepatitis A vaccine  Aged Out    Hib vaccine  Aged Out    Meningococcal (ACWY) vaccine  Aged Out    Hepatitis C screen  Discontinued
Health Maintenance   Topic Date Due    Flu vaccine (1) 08/01/2023    Lipids  10/27/2023    Depression Screen  04/03/2024    Annual Wellness Visit (AWV)  04/03/2024    DTaP/Tdap/Td vaccine (2 - Td or Tdap) 11/18/2031    Shingles vaccine  Completed    Pneumococcal 65+ years Vaccine  Completed    COVID-19 Vaccine  Completed    Hepatitis A vaccine  Aged Out    Hib vaccine  Aged Out    Meningococcal (ACWY) vaccine  Aged Out    Hepatitis C screen  Discontinued       Subjective:     Review of Systems   Constitutional:  Positive for fatigue. Negative for appetite change and diaphoresis. HENT:  Negative for ear discharge and trouble swallowing. Eyes:  Negative for pain and discharge. Respiratory:  Positive for shortness of breath. Negative for cough and wheezing. Cardiovascular:  Negative for chest pain and palpitations. Gastrointestinal:  Negative for abdominal distention, blood in stool and diarrhea. Endocrine: Negative for polydipsia and polyphagia. Genitourinary:  Negative for difficulty urinating and frequency. Musculoskeletal:  Negative for gait problem, myalgias and neck pain. Skin:  Negative for color change and rash. Allergic/Immunologic: Negative for environmental allergies and food allergies. Neurological:  Negative for dizziness and headaches. Hematological:  Negative for adenopathy. Does not bruise/bleed easily. Psychiatric/Behavioral:  Negative for behavioral problems and sleep disturbance. Objective:     Physical Exam  Constitutional:       Appearance: He is well-developed. He is not diaphoretic. HENT:      Head: Normocephalic and atraumatic. Eyes:      General:         Right eye: No discharge. Left eye: No discharge. Extraocular Movements:      Right eye: Normal extraocular motion. Left eye: Normal extraocular motion. Conjunctiva/sclera: Conjunctivae normal.      Right eye: Right conjunctiva is not injected.       Left eye: Left conjunctiva is

## 2023-07-11 ENCOUNTER — HOSPITAL ENCOUNTER (OUTPATIENT)
Age: 78
Setting detail: SPECIMEN
Discharge: HOME | End: 2023-07-11

## 2023-07-11 ENCOUNTER — HOSPITAL ENCOUNTER (OUTPATIENT)
Facility: CLINIC | Age: 78
Discharge: HOME | End: 2023-07-13
Payer: MEDICARE

## 2023-07-11 ENCOUNTER — HOSPITAL ENCOUNTER (OUTPATIENT)
Dept: GENERAL RADIOLOGY | Facility: CLINIC | Age: 78
Discharge: HOME | End: 2023-07-13
Payer: MEDICARE

## 2023-07-11 DIAGNOSIS — R06.09 DOE (DYSPNEA ON EXERTION): ICD-10-CM

## 2023-07-11 PROCEDURE — 71046 X-RAY EXAM CHEST 2 VIEWS: CPT

## 2023-07-12 LAB
BASOPHILS # BLD: 0.04 K/UL (ref 0–0.2)
BASOPHILS NFR BLD: 1 % (ref 0–2)
EOSINOPHIL # BLD: 0.19 K/UL (ref 0–0.44)
EOSINOPHILS RELATIVE PERCENT: 3 % (ref 1–4)
ERYTHROCYTE [DISTWIDTH] IN BLOOD BY AUTOMATED COUNT: 14.2 % (ref 11.8–14.4)
HCT VFR BLD AUTO: 40.1 % (ref 40.7–50.3)
HGB BLD-MCNC: 12.3 G/DL (ref 13–17)
IMM GRANULOCYTES # BLD AUTO: <0.03 K/UL (ref 0–0.3)
IMM GRANULOCYTES NFR BLD: 0 %
LYMPHOCYTES # BLD: 24 % (ref 24–43)
LYMPHOCYTES NFR BLD: 1.68 K/UL (ref 1.1–3.7)
MCH RBC QN AUTO: 26.3 PG (ref 25.2–33.5)
MCHC RBC AUTO-ENTMCNC: 30.7 G/DL (ref 28.4–34.8)
MCV RBC AUTO: 85.7 FL (ref 82.6–102.9)
MONOCYTES NFR BLD: 0.55 K/UL (ref 0.1–1.2)
MONOCYTES NFR BLD: 8 % (ref 3–12)
NEUTROPHILS NFR BLD: 64 % (ref 36–65)
NEUTS SEG NFR BLD: 4.48 K/UL (ref 1.5–8.1)
NRBC BLD-RTO: 0 PER 100 WBC
PLATELET # BLD AUTO: 213 K/UL (ref 138–453)
PMV BLD AUTO: 10.9 FL (ref 8.1–13.5)
RBC # BLD AUTO: 4.68 M/UL (ref 4.21–5.77)
WBC OTHER # BLD: 7 K/UL (ref 3.5–11.3)

## 2023-08-17 ENCOUNTER — HOSPITAL ENCOUNTER (OUTPATIENT)
Age: 78
Setting detail: SPECIMEN
Discharge: HOME | End: 2023-08-17

## 2023-08-18 LAB — PSA SERPL-MCNC: 0.23 NG/ML

## 2023-11-13 RX ORDER — ATORVASTATIN CALCIUM 80 MG/1
TABLET, FILM COATED ORAL
Qty: 90 TABLET | Refills: 0 | Status: SHIPPED | OUTPATIENT
Start: 2023-11-13

## 2023-11-30 ENCOUNTER — OFFICE VISIT (OUTPATIENT)
Dept: INTERNAL MEDICINE CLINIC | Age: 78
End: 2023-11-30
Payer: MEDICARE

## 2023-11-30 VITALS
HEIGHT: 70 IN | DIASTOLIC BLOOD PRESSURE: 70 MMHG | HEART RATE: 69 BPM | WEIGHT: 242 LBS | BODY MASS INDEX: 34.65 KG/M2 | SYSTOLIC BLOOD PRESSURE: 122 MMHG | OXYGEN SATURATION: 98 %

## 2023-11-30 DIAGNOSIS — Z87.09 H/O ASBESTOSIS: ICD-10-CM

## 2023-11-30 DIAGNOSIS — G47.33 OBSTRUCTIVE SLEEP APNEA ON CPAP: ICD-10-CM

## 2023-11-30 DIAGNOSIS — I10 ESSENTIAL HYPERTENSION: ICD-10-CM

## 2023-11-30 DIAGNOSIS — I11.9 HYPERTENSIVE HEART DISEASE WITHOUT HEART FAILURE: ICD-10-CM

## 2023-11-30 DIAGNOSIS — R06.09 DYSPNEA ON EXERTION: Primary | ICD-10-CM

## 2023-11-30 PROCEDURE — 1123F ACP DISCUSS/DSCN MKR DOCD: CPT | Performed by: INTERNAL MEDICINE

## 2023-11-30 PROCEDURE — 3078F DIAST BP <80 MM HG: CPT | Performed by: INTERNAL MEDICINE

## 2023-11-30 PROCEDURE — 3074F SYST BP LT 130 MM HG: CPT | Performed by: INTERNAL MEDICINE

## 2023-11-30 PROCEDURE — 99214 OFFICE O/P EST MOD 30 MIN: CPT | Performed by: INTERNAL MEDICINE

## 2023-11-30 RX ORDER — CARVEDILOL 25 MG/1
TABLET ORAL
COMMUNITY
Start: 2023-08-24 | End: 2023-11-30 | Stop reason: SDUPTHER

## 2023-11-30 RX ORDER — LISINOPRIL AND HYDROCHLOROTHIAZIDE 25; 20 MG/1; MG/1
1 TABLET ORAL 2 TIMES DAILY
Qty: 180 TABLET | Refills: 3 | Status: SHIPPED | OUTPATIENT
Start: 2023-11-30

## 2023-11-30 RX ORDER — CARVEDILOL 25 MG/1
TABLET ORAL
Qty: 180 TABLET | Refills: 2 | Status: SHIPPED | OUTPATIENT
Start: 2023-11-30

## 2023-11-30 ASSESSMENT — ENCOUNTER SYMPTOMS
COUGH: 0
EYE DISCHARGE: 0
SHORTNESS OF BREATH: 1
TROUBLE SWALLOWING: 0
COLOR CHANGE: 0
EYE PAIN: 0
BLOOD IN STOOL: 0
DIARRHEA: 0
WHEEZING: 0
ABDOMINAL DISTENTION: 0

## 2023-12-07 ENCOUNTER — HOSPITAL ENCOUNTER (OUTPATIENT)
Dept: CT IMAGING | Facility: CLINIC | Age: 78
Discharge: HOME | End: 2023-12-09
Payer: MEDICARE

## 2023-12-07 DIAGNOSIS — R06.09 DYSPNEA ON EXERTION: ICD-10-CM

## 2023-12-07 PROCEDURE — 71250 CT THORAX DX C-: CPT

## 2023-12-28 ENCOUNTER — HOSPITAL ENCOUNTER (OUTPATIENT)
Age: 78
Discharge: HOME | End: 2023-12-30
Attending: INTERNAL MEDICINE
Payer: MEDICARE

## 2023-12-28 ENCOUNTER — TELEPHONE (OUTPATIENT)
Dept: INTERNAL MEDICINE CLINIC | Age: 78
End: 2023-12-28

## 2023-12-28 VITALS
WEIGHT: 241 LBS | DIASTOLIC BLOOD PRESSURE: 80 MMHG | HEIGHT: 70 IN | SYSTOLIC BLOOD PRESSURE: 130 MMHG | BODY MASS INDEX: 34.5 KG/M2 | HEART RATE: 71 BPM

## 2023-12-28 DIAGNOSIS — R06.02 BREATH SHORTNESS: ICD-10-CM

## 2023-12-28 DIAGNOSIS — R79.89 ELEVATED BRAIN NATRIURETIC PEPTIDE (BNP) LEVEL: ICD-10-CM

## 2023-12-28 LAB
ECHO AO ROOT DIAM: 4 CM
ECHO AO ROOT INDEX: 1.77 CM/M2
ECHO AV AREA PEAK VELOCITY: 2.4 CM2
ECHO AV AREA VTI: 2.2 CM2
ECHO AV AREA/BSA PEAK VELOCITY: 1.1 CM2/M2
ECHO AV AREA/BSA VTI: 1 CM2/M2
ECHO AV MEAN GRADIENT: 4 MMHG
ECHO AV MEAN VELOCITY: 1 M/S
ECHO AV PEAK GRADIENT: 7 MMHG
ECHO AV PEAK VELOCITY: 1.3 M/S
ECHO AV VELOCITY RATIO: 0.69
ECHO AV VTI: 29.9 CM
ECHO BSA: 2.32 M2
ECHO LA AREA 2C: 19.3 CM2
ECHO LA AREA 4C: 23.3 CM2
ECHO LA DIAMETER INDEX: 2.3 CM/M2
ECHO LA DIAMETER: 5.2 CM
ECHO LA MAJOR AXIS: 6.2 CM
ECHO LA MINOR AXIS: 5.8 CM
ECHO LA TO AORTIC ROOT RATIO: 1.3
ECHO LA VOL BP: 62 ML (ref 18–58)
ECHO LA VOL MOD A2C: 51 ML (ref 18–58)
ECHO LA VOL MOD A4C: 72 ML (ref 18–58)
ECHO LA VOL/BSA BIPLANE: 27 ML/M2 (ref 16–34)
ECHO LA VOLUME INDEX MOD A2C: 23 ML/M2 (ref 16–34)
ECHO LA VOLUME INDEX MOD A4C: 32 ML/M2 (ref 16–34)
ECHO LV E' LATERAL VELOCITY: 5 CM/S
ECHO LV E' SEPTAL VELOCITY: 3 CM/S
ECHO LV EDV A2C: 115 ML
ECHO LV EDV A4C: 210 ML
ECHO LV EDV INDEX A4C: 93 ML/M2
ECHO LV EDV NDEX A2C: 51 ML/M2
ECHO LV EJECTION FRACTION A2C: 38 %
ECHO LV EJECTION FRACTION A4C: 42 %
ECHO LV EJECTION FRACTION BIPLANE: 38 % (ref 55–100)
ECHO LV ESV A2C: 71 ML
ECHO LV ESV A4C: 122 ML
ECHO LV ESV INDEX A2C: 31 ML/M2
ECHO LV ESV INDEX A4C: 54 ML/M2
ECHO LV FRACTIONAL SHORTENING: 16 % (ref 28–44)
ECHO LV INTERNAL DIMENSION DIASTOLE INDEX: 1.95 CM/M2
ECHO LV INTERNAL DIMENSION DIASTOLIC: 4.4 CM (ref 4.2–5.9)
ECHO LV INTERNAL DIMENSION SYSTOLIC INDEX: 1.64 CM/M2
ECHO LV INTERNAL DIMENSION SYSTOLIC: 3.7 CM
ECHO LV IVSD: 1.8 CM (ref 0.6–1)
ECHO LV MASS 2D: 339.9 G (ref 88–224)
ECHO LV MASS INDEX 2D: 150.4 G/M2 (ref 49–115)
ECHO LV POSTERIOR WALL DIASTOLIC: 1.7 CM (ref 0.6–1)
ECHO LV RELATIVE WALL THICKNESS RATIO: 0.77
ECHO LVOT AREA: 3.5 CM2
ECHO LVOT AV VTI INDEX: 0.65
ECHO LVOT DIAM: 2.1 CM
ECHO LVOT MEAN GRADIENT: 2 MMHG
ECHO LVOT PEAK GRADIENT: 3 MMHG
ECHO LVOT PEAK VELOCITY: 0.9 M/S
ECHO LVOT STROKE VOLUME INDEX: 29.7 ML/M2
ECHO LVOT SV: 67.2 ML
ECHO LVOT VTI: 19.4 CM
ECHO MV A VELOCITY: 1.1 M/S
ECHO MV E DECELERATION TIME (DT): 275 MS
ECHO MV E VELOCITY: 0.63 M/S
ECHO MV E/A RATIO: 0.57
ECHO MV E/E' LATERAL: 12.6
ECHO MV E/E' RATIO (AVERAGED): 16.8
ECHO RA AREA 4C: 16.8 CM2
ECHO RA END SYSTOLIC VOLUME APICAL 4 CHAMBER INDEX BSA: 19 ML/M2
ECHO RA VOLUME: 42 ML
ECHO RV TAPSE: 1.4 CM (ref 1.7–?)
ECHO TV REGURGITANT MAX VELOCITY: 2 M/S
ECHO TV REGURGITANT PEAK GRADIENT: 16 MMHG

## 2023-12-28 PROCEDURE — 93306 TTE W/DOPPLER COMPLETE: CPT

## 2023-12-29 NOTE — TELEPHONE ENCOUNTER
Please inform patient that there is a slight decrease in ejection fraction, on cardiology notes it was reported at 53%, on most recent echocardiogram was 45 to 50%. Not a huge drop however would recommend he inform his cardiologist at [facility], also we can discuss this further at our visit on 1/10/2024.      Thanks

## 2024-01-10 ENCOUNTER — OFFICE VISIT (OUTPATIENT)
Dept: INTERNAL MEDICINE CLINIC | Age: 79
End: 2024-01-10

## 2024-01-10 VITALS
HEART RATE: 73 BPM | BODY MASS INDEX: 34.65 KG/M2 | HEIGHT: 70 IN | OXYGEN SATURATION: 97 % | DIASTOLIC BLOOD PRESSURE: 82 MMHG | SYSTOLIC BLOOD PRESSURE: 136 MMHG | WEIGHT: 242 LBS

## 2024-01-10 DIAGNOSIS — I20.8 ANGINAL EQUIVALENT: Primary | ICD-10-CM

## 2024-01-10 DIAGNOSIS — J84.112 UIP (USUAL INTERSTITIAL PNEUMONITIS) (HCC): ICD-10-CM

## 2024-01-10 DIAGNOSIS — I25.119 CORONARY ARTERY DISEASE INVOLVING NATIVE CORONARY ARTERY OF NATIVE HEART WITH ANGINA PECTORIS (HCC): ICD-10-CM

## 2024-01-10 DIAGNOSIS — R06.09 DOE (DYSPNEA ON EXERTION): ICD-10-CM

## 2024-01-10 ASSESSMENT — ENCOUNTER SYMPTOMS
BLOOD IN STOOL: 0
WHEEZING: 0
COLOR CHANGE: 0
EYE PAIN: 0
DIARRHEA: 0
COUGH: 0
ABDOMINAL DISTENTION: 0
SHORTNESS OF BREATH: 1
TROUBLE SWALLOWING: 0
EYE DISCHARGE: 0

## 2024-01-10 ASSESSMENT — PATIENT HEALTH QUESTIONNAIRE - PHQ9
9. THOUGHTS THAT YOU WOULD BE BETTER OFF DEAD, OR OF HURTING YOURSELF: 0
SUM OF ALL RESPONSES TO PHQ QUESTIONS 1-9: 11
1. LITTLE INTEREST OR PLEASURE IN DOING THINGS: 3
10. IF YOU CHECKED OFF ANY PROBLEMS, HOW DIFFICULT HAVE THESE PROBLEMS MADE IT FOR YOU TO DO YOUR WORK, TAKE CARE OF THINGS AT HOME, OR GET ALONG WITH OTHER PEOPLE: 2
2. FEELING DOWN, DEPRESSED OR HOPELESS: 3
5. POOR APPETITE OR OVEREATING: 0
SUM OF ALL RESPONSES TO PHQ QUESTIONS 1-9: 11
8. MOVING OR SPEAKING SO SLOWLY THAT OTHER PEOPLE COULD HAVE NOTICED. OR THE OPPOSITE, BEING SO FIGETY OR RESTLESS THAT YOU HAVE BEEN MOVING AROUND A LOT MORE THAN USUAL: 0
4. FEELING TIRED OR HAVING LITTLE ENERGY: 3
7. TROUBLE CONCENTRATING ON THINGS, SUCH AS READING THE NEWSPAPER OR WATCHING TELEVISION: 0
3. TROUBLE FALLING OR STAYING ASLEEP: 0
6. FEELING BAD ABOUT YOURSELF - OR THAT YOU ARE A FAILURE OR HAVE LET YOURSELF OR YOUR FAMILY DOWN: 2
SUM OF ALL RESPONSES TO PHQ QUESTIONS 1-9: 11
SUM OF ALL RESPONSES TO PHQ QUESTIONS 1-9: 11
SUM OF ALL RESPONSES TO PHQ9 QUESTIONS 1 & 2: 6

## 2024-01-10 NOTE — PROGRESS NOTES
Visit Information    Have you changed or started any medications since your last visit including any over-the-counter medicines, vitamins, or herbal medicines? no   Are you having any side effects from any of your medications? -  no  Have you stopped taking any of your medications? Is so, why? -  no    Have you seen any other physician or provider since your last visit? No  Have you had any other diagnostic tests since your last visit? yes  Have you been seen in the emergency room and/or had an admission to a hospital since we last saw you? No  Have you had your routine dental cleaning in the past 6 months? no    Have you activated your [employer] account? If not, what are your barriers? Yes     Patient Care Team:  Zaid Teixeira MD as PCP - General (Internal Medicine)  Zaid Teixeira MD as PCP - Empaneled Provider    Medical History Review  Past Medical, Family, and Social History reviewed and does contribute to the patient presenting condition    Health Maintenance   Topic Date Due    Respiratory Syncytial Virus (RSV) Pregnant or age 60 yrs+ (1 - 1-dose 60+ series) Never done    Lipids  10/27/2023    Annual Wellness Visit (Medicare Advantage)  01/01/2024    Depression Screen  04/03/2024    DTaP/Tdap/Td vaccine (2 - Td or Tdap) 11/18/2031    Flu vaccine  Completed    Shingles vaccine  Completed    Pneumococcal 65+ years Vaccine  Completed    COVID-19 Vaccine  Completed    Hepatitis A vaccine  Aged Out    Hepatitis B vaccine  Aged Out    Hib vaccine  Aged Out    Polio vaccine  Aged Out    Meningococcal (ACWY) vaccine  Aged Out    Hepatitis C screen  Discontinued       
kg/m²     Assessment:       Diagnosis Orders   1. Anginal equivalent        2. UIP (usual interstitial pneumonitis) (HCC)        3. Coronary artery disease involving native coronary artery of native heart with angina pectoris (HCC)  Lipid, Fasting      4. WEAVER (dyspnea on exertion)  RACHEAL (Epic) - James Cintron MD, Cardiology, [place]    Nuclear stress test with myocardial perfusion                Plan:      Return in about 2 weeks (around 1/24/2024).    Orders Placed This Encounter   Procedures    Lipid, Fasting     Standing Status:   Future     Standing Expiration Date:   4/10/2024    RACHEAL (Epic) - James Cintron MD, Cardiology, [place]     Referral Priority:   Routine     Referral Type:   Eval and Treat     Referral Reason:   Specialty Services Required     Referred to Provider:   James Cintron MD     Requested Specialty:   Cardiology     Number of Visits Requested:   1     No orders of the defined types were placed in this encounter.     Weaver no history of smoking but history of asbestosis patient had seen [facility] cardiology had stress test in the past had a last cardiac cath and stent placement in 2017  Workup for dyspnea CT chest was done which suggested usual interstitial pneumonitis with multivessel disease  Will order stress test referred to cardiology as above patient may need cardiac cath  Echonoted ef 45 to 50 percent  Aortic root dilated 4 cm mild  On high dose statin and asa BB    PFT normal per dr sierra  No cp  Under stress from wife in [place] unit  Dementia with behavioral disturbance       Patient given educational materials - see patient instructions.Discussed use, benefit, and side effects of prescribed medications.  All patientquestions answered. Pt voiced understanding. Reviewed health maintenance.  Instructedto continue current medications, diet and exercise.  Patient agreed with treatmentplan. Follow up as directed.       Please note that this chart was generated using voice

## 2024-01-11 ENCOUNTER — TELEPHONE (OUTPATIENT)
Dept: INTERNAL MEDICINE CLINIC | Age: 79
End: 2024-01-11

## 2024-01-11 ENCOUNTER — HOSPITAL ENCOUNTER (OUTPATIENT)
Age: 79
Setting detail: SPECIMEN
Discharge: HOME | End: 2024-01-11

## 2024-01-11 DIAGNOSIS — I25.119 CORONARY ARTERY DISEASE INVOLVING NATIVE CORONARY ARTERY OF NATIVE HEART WITH ANGINA PECTORIS (HCC): ICD-10-CM

## 2024-01-11 LAB
CHOLEST SERPL-MCNC: 134 MG/DL (ref 0–199)
CHOLESTEROL/HDL RATIO: 3
HDLC SERPL-MCNC: 41 MG/DL
LDLC SERPL CALC-MCNC: 65 MG/DL (ref 0–100)
TRIGL SERPL-MCNC: 142 MG/DL (ref 0–149)
VLDLC SERPL CALC-MCNC: 28 MG/DL

## 2024-01-11 NOTE — TELEPHONE ENCOUNTER
Armando bond.     He is schedule for his Stress Test on 1/18/24 at [facility] would like to know if he needs to stop taking his BP medication before the test?    Please advise patient.    Thank you.

## 2024-01-15 ENCOUNTER — TELEPHONE (OUTPATIENT)
Dept: INTERNAL MEDICINE CLINIC | Age: 79
End: 2024-01-15

## 2024-01-15 NOTE — TELEPHONE ENCOUNTER
Patient tested positive this morning for Covid. His symptoms started yesterday with a sore throat and sinus issues. He then slept for 11 hours.    He would like Paxlovid sent in for him.    [facility]

## 2024-01-16 DIAGNOSIS — U07.1 COVID-19 VIRUS INFECTION: Primary | ICD-10-CM

## 2024-01-29 ENCOUNTER — HOSPITAL ENCOUNTER (OUTPATIENT)
Dept: NUCLEAR MEDICINE | Age: 79
Discharge: HOME | End: 2024-01-31
Attending: INTERNAL MEDICINE
Payer: MEDICARE

## 2024-01-29 ENCOUNTER — HOSPITAL ENCOUNTER (OUTPATIENT)
Age: 79
Discharge: HOME | End: 2024-01-31
Attending: INTERNAL MEDICINE
Payer: MEDICARE

## 2024-01-29 DIAGNOSIS — R06.09 DOE (DYSPNEA ON EXERTION): ICD-10-CM

## 2024-01-29 LAB
STRESS BASELINE HR: 61 BPM
STRESS ESTIMATED WORKLOAD: 1 METS
STRESS PEAK DIAS BP: 46 MMHG
STRESS PEAK SYS BP: 143 MMHG
STRESS PERCENT HR ACHIEVED: 57 %
STRESS POST PEAK HR: 80 BPM
STRESS RATE PRESSURE PRODUCT: NORMAL BPM*MMHG
STRESS STAGE RECOVERY 1 BP: NORMAL MMHG
STRESS STAGE RECOVERY 1 DURATION: 1 MIN:SEC
STRESS STAGE RECOVERY 1 HR: 74 BPM
STRESS STAGE RECOVERY 2 BP: NORMAL MMHG
STRESS STAGE RECOVERY 2 DURATION: 5 MIN:SEC
STRESS STAGE RECOVERY 2 HR: 73 BPM
STRESS TARGET HR: 140 BPM

## 2024-01-29 PROCEDURE — 93016 CV STRESS TEST SUPVJ ONLY: CPT | Performed by: RADIOLOGY

## 2024-01-29 PROCEDURE — 93018 CV STRESS TEST I&R ONLY: CPT | Performed by: RADIOLOGY

## 2024-01-29 PROCEDURE — 3430000000 HC RX DIAGNOSTIC RADIOPHARMACEUTICAL: Performed by: INTERNAL MEDICINE

## 2024-01-29 PROCEDURE — 6360000002 HC RX W HCPCS: Performed by: INTERNAL MEDICINE

## 2024-01-29 PROCEDURE — 78452 HT MUSCLE IMAGE SPECT MULT: CPT

## 2024-01-29 PROCEDURE — A9500 TC99M SESTAMIBI: HCPCS | Performed by: INTERNAL MEDICINE

## 2024-01-29 PROCEDURE — 93017 CV STRESS TEST TRACING ONLY: CPT

## 2024-01-29 PROCEDURE — 2580000003 HC RX 258: Performed by: INTERNAL MEDICINE

## 2024-01-29 RX ORDER — ATROPINE SULFATE 0.1 MG/ML
0.5 INJECTION INTRAVENOUS EVERY 5 MIN PRN
Status: ACTIVE | OUTPATIENT
Start: 2024-01-29 | End: 2024-01-29

## 2024-01-29 RX ORDER — SODIUM CHLORIDE 0.9 % (FLUSH) 0.9 %
5-40 SYRINGE (ML) INJECTION PRN
Status: ACTIVE | OUTPATIENT
Start: 2024-01-29 | End: 2024-01-29

## 2024-01-29 RX ORDER — SODIUM CHLORIDE 9 MG/ML
500 INJECTION, SOLUTION INTRAVENOUS CONTINUOUS PRN
Status: ACTIVE | OUTPATIENT
Start: 2024-01-29 | End: 2024-01-29

## 2024-01-29 RX ORDER — AMINOPHYLLINE 25 MG/ML
50 INJECTION, SOLUTION INTRAVENOUS PRN
Status: ACTIVE | OUTPATIENT
Start: 2024-01-29 | End: 2024-01-29

## 2024-01-29 RX ORDER — METOPROLOL TARTRATE 1 MG/ML
5 INJECTION, SOLUTION INTRAVENOUS EVERY 5 MIN PRN
Status: ACTIVE | OUTPATIENT
Start: 2024-01-29 | End: 2024-01-29

## 2024-01-29 RX ORDER — ALBUTEROL SULFATE 90 UG/1
2 AEROSOL, METERED RESPIRATORY (INHALATION) PRN
Status: ACTIVE | OUTPATIENT
Start: 2024-01-29 | End: 2024-01-29

## 2024-01-29 RX ORDER — REGADENOSON 0.08 MG/ML
0.4 INJECTION, SOLUTION INTRAVENOUS
Status: COMPLETED | OUTPATIENT
Start: 2024-01-29 | End: 2024-01-29

## 2024-01-29 RX ORDER — SODIUM CHLORIDE 0.9 % (FLUSH) 0.9 %
10 SYRINGE (ML) INJECTION PRN
Status: DISCONTINUED | OUTPATIENT
Start: 2024-01-29 | End: 2024-02-01 | Stop reason: HOSPADM

## 2024-01-29 RX ORDER — NITROGLYCERIN 0.4 MG/1
0.4 TABLET SUBLINGUAL EVERY 5 MIN PRN
Status: ACTIVE | OUTPATIENT
Start: 2024-01-29 | End: 2024-01-29

## 2024-01-29 RX ORDER — TETRAKIS(2-METHOXYISOBUTYLISOCYANIDE)COPPER(I) TETRAFLUOROBORATE 1 MG/ML
10 INJECTION, POWDER, LYOPHILIZED, FOR SOLUTION INTRAVENOUS
Status: COMPLETED | OUTPATIENT
Start: 2024-01-29 | End: 2024-01-29

## 2024-01-29 RX ORDER — TETRAKIS(2-METHOXYISOBUTYLISOCYANIDE)COPPER(I) TETRAFLUOROBORATE 1 MG/ML
30 INJECTION, POWDER, LYOPHILIZED, FOR SOLUTION INTRAVENOUS
Status: COMPLETED | OUTPATIENT
Start: 2024-01-29 | End: 2024-01-29

## 2024-01-29 RX ADMIN — SODIUM CHLORIDE, PRESERVATIVE FREE 10 ML: 5 INJECTION INTRAVENOUS at 07:02

## 2024-01-29 RX ADMIN — Medication 36.5 MILLICURIE: at 08:53

## 2024-01-29 RX ADMIN — Medication 10.8 MILLICURIE: at 07:01

## 2024-01-29 RX ADMIN — REGADENOSON 0.4 MG: 0.08 INJECTION, SOLUTION INTRAVENOUS at 08:51

## 2024-02-05 ENCOUNTER — OFFICE VISIT (OUTPATIENT)
Dept: INTERNAL MEDICINE CLINIC | Age: 79
End: 2024-02-05
Payer: MEDICARE

## 2024-02-05 VITALS
HEIGHT: 70 IN | BODY MASS INDEX: 34.79 KG/M2 | SYSTOLIC BLOOD PRESSURE: 138 MMHG | DIASTOLIC BLOOD PRESSURE: 84 MMHG | WEIGHT: 243 LBS | HEART RATE: 67 BPM | OXYGEN SATURATION: 99 %

## 2024-02-05 DIAGNOSIS — I25.119 CORONARY ARTERY DISEASE INVOLVING NATIVE CORONARY ARTERY OF NATIVE HEART WITH ANGINA PECTORIS (HCC): ICD-10-CM

## 2024-02-05 DIAGNOSIS — I50.22 CHRONIC SYSTOLIC CONGESTIVE HEART FAILURE (HCC): ICD-10-CM

## 2024-02-05 DIAGNOSIS — I10 ESSENTIAL HYPERTENSION: ICD-10-CM

## 2024-02-05 DIAGNOSIS — R06.09 DOE (DYSPNEA ON EXERTION): Primary | ICD-10-CM

## 2024-02-05 PROCEDURE — 3075F SYST BP GE 130 - 139MM HG: CPT | Performed by: INTERNAL MEDICINE

## 2024-02-05 PROCEDURE — 3079F DIAST BP 80-89 MM HG: CPT | Performed by: INTERNAL MEDICINE

## 2024-02-05 PROCEDURE — 1123F ACP DISCUSS/DSCN MKR DOCD: CPT | Performed by: INTERNAL MEDICINE

## 2024-02-05 PROCEDURE — 99214 OFFICE O/P EST MOD 30 MIN: CPT | Performed by: INTERNAL MEDICINE

## 2024-02-05 ASSESSMENT — ENCOUNTER SYMPTOMS
EYE DISCHARGE: 0
COLOR CHANGE: 0
WHEEZING: 0
SHORTNESS OF BREATH: 1
BLOOD IN STOOL: 0
TROUBLE SWALLOWING: 0
ABDOMINAL DISTENTION: 0
COUGH: 0
EYE PAIN: 0
DIARRHEA: 0

## 2024-02-05 NOTE — PROGRESS NOTES
Visit Information    Have you changed or started any medications since your last visit including any over-the-counter medicines, vitamins, or herbal medicines? no   Are you having any side effects from any of your medications? -  no  Have you stopped taking any of your medications? Is so, why? -  no    Have you seen any other physician or provider since your last visit? No  Have you had any other diagnostic tests since your last visit? Yes - Records Obtained  Have you been seen in the emergency room and/or had an admission to a hospital since we last saw you? No  Have you had your routine dental cleaning in the past 6 months? no    Have you activated your [employer] account? If not, what are your barriers? Yes     Patient Care Team:  Zaid Teixeira MD as PCP - General (Internal Medicine)  Zaid Teixeira MD as PCP - Empaneled Provider    Medical History Review  Past Medical, Family, and Social History reviewed and does contribute to the patient presenting condition    Health Maintenance   Topic Date Due    Respiratory Syncytial Virus (RSV) Pregnant or age 60 yrs+ (1 - 1-dose 60+ series) Never done    Annual Wellness Visit (Medicare Advantage)  01/01/2024    Depression Monitoring  01/10/2025    Lipids  01/11/2025    DTaP/Tdap/Td vaccine (2 - Td or Tdap) 11/18/2031    Flu vaccine  Completed    Shingles vaccine  Completed    Pneumococcal 65+ years Vaccine  Completed    COVID-19 Vaccine  Completed    Hepatitis A vaccine  Aged Out    Hepatitis B vaccine  Aged Out    Hib vaccine  Aged Out    Polio vaccine  Aged Out    Meningococcal (ACWY) vaccine  Aged Out    Depression Screen  Discontinued    Hepatitis C screen  Discontinued       
Shortness Of Breath    Amlodipine Dizziness or Vertigo       Health Maintenance   Topic Date Due    Respiratory Syncytial Virus (RSV) Pregnant or age 60 yrs+ (1 - 1-dose 60+ series) Never done    Annual Wellness Visit (Medicare Advantage)  01/01/2024    Depression Monitoring  01/10/2025    Lipids  01/11/2025    DTaP/Tdap/Td vaccine (2 - Td or Tdap) 11/18/2031    Flu vaccine  Completed    Shingles vaccine  Completed    Pneumococcal 65+ years Vaccine  Completed    COVID-19 Vaccine  Completed    Hepatitis A vaccine  Aged Out    Hepatitis B vaccine  Aged Out    Hib vaccine  Aged Out    Polio vaccine  Aged Out    Meningococcal (ACWY) vaccine  Aged Out    Depression Screen  Discontinued    Hepatitis C screen  Discontinued       Subjective:     Review of Systems   Constitutional:  Negative for appetite change, diaphoresis and fatigue.   HENT:  Negative for ear discharge and trouble swallowing.    Eyes:  Negative for pain and discharge.   Respiratory:  Positive for shortness of breath. Negative for cough and wheezing.         MATHEW   Cardiovascular:  Negative for chest pain and palpitations.   Gastrointestinal:  Negative for abdominal distention, blood in stool and diarrhea.   Endocrine: Negative for polydipsia and polyphagia.   Genitourinary:  Negative for difficulty urinating and frequency.   Musculoskeletal:  Negative for gait problem, myalgias and neck pain.   Skin:  Negative for color change and rash.   Allergic/Immunologic: Negative for environmental allergies and food allergies.   Neurological:  Negative for dizziness and headaches.   Hematological:  Negative for adenopathy. Does not bruise/bleed easily.   Psychiatric/Behavioral:  Negative for behavioral problems and sleep disturbance.        Objective:     Physical Exam  Constitutional:       Appearance: He is well-developed. He is not diaphoretic.   HENT:      Head: Normocephalic and atraumatic.   Eyes:      General:         Right eye: No discharge.         Left eye:

## 2024-02-20 ENCOUNTER — HOSPITAL ENCOUNTER (OUTPATIENT)
Age: 79
Setting detail: OUTPATIENT SURGERY
Discharge: HOME | DRG: 322 | End: 2024-02-20
Attending: INTERNAL MEDICINE | Admitting: INTERNAL MEDICINE
Payer: MEDICARE

## 2024-02-20 VITALS
HEIGHT: 70 IN | TEMPERATURE: 97 F | BODY MASS INDEX: 34.65 KG/M2 | HEART RATE: 71 BPM | WEIGHT: 242 LBS | RESPIRATION RATE: 12 BRPM | SYSTOLIC BLOOD PRESSURE: 144 MMHG | DIASTOLIC BLOOD PRESSURE: 73 MMHG | OXYGEN SATURATION: 96 %

## 2024-02-20 DIAGNOSIS — R94.39 ABNORMAL STRESS TEST: ICD-10-CM

## 2024-02-20 LAB
BUN BLD-MCNC: 13 MG/DL (ref 8–26)
CHLORIDE BLD-SCNC: 99 MMOL/L (ref 98–107)
ECHO BSA: 2.33 M2
EGFR, POC: >60 ML/MIN/1.73M2
GLUCOSE BLD-MCNC: 83 MG/DL (ref 74–100)
HCT VFR BLD AUTO: 41 % (ref 41–53)
POC CREATININE: 0.6 MG/DL (ref 0.51–1.19)
POC HEMOGLOBIN (CALC): 13.8 G/DL (ref 13.5–17.5)
POTASSIUM BLD-SCNC: 3.2 MMOL/L (ref 3.5–4.5)
SODIUM BLD-SCNC: 137 MMOL/L (ref 138–146)

## 2024-02-20 PROCEDURE — 4A023N7 MEASUREMENT OF CARDIAC SAMPLING AND PRESSURE, LEFT HEART, PERCUTANEOUS APPROACH: ICD-10-PCS | Performed by: STUDENT IN AN ORGANIZED HEALTH CARE EDUCATION/TRAINING PROGRAM

## 2024-02-20 PROCEDURE — 82565 ASSAY OF CREATININE: CPT

## 2024-02-20 PROCEDURE — 92928 PRQ TCAT PLMT NTRAC ST 1 LES: CPT | Performed by: INTERNAL MEDICINE

## 2024-02-20 PROCEDURE — 2580000003 HC RX 258: Performed by: INTERNAL MEDICINE

## 2024-02-20 PROCEDURE — 2500000003 HC RX 250 WO HCPCS: Performed by: INTERNAL MEDICINE

## 2024-02-20 PROCEDURE — 93458 L HRT ARTERY/VENTRICLE ANGIO: CPT | Performed by: INTERNAL MEDICINE

## 2024-02-20 PROCEDURE — C1874 STENT, COATED/COV W/DEL SYS: HCPCS | Performed by: INTERNAL MEDICINE

## 2024-02-20 PROCEDURE — 7100000011 HC PHASE II RECOVERY - ADDTL 15 MIN: Performed by: INTERNAL MEDICINE

## 2024-02-20 PROCEDURE — 84520 ASSAY OF UREA NITROGEN: CPT

## 2024-02-20 PROCEDURE — 027034Z DILATION OF CORONARY ARTERY, ONE ARTERY WITH DRUG-ELUTING INTRALUMINAL DEVICE, PERCUTANEOUS APPROACH: ICD-10-PCS | Performed by: INTERNAL MEDICINE

## 2024-02-20 PROCEDURE — 7100000010 HC PHASE II RECOVERY - FIRST 15 MIN: Performed by: INTERNAL MEDICINE

## 2024-02-20 PROCEDURE — 6370000000 HC RX 637 (ALT 250 FOR IP): Performed by: INTERNAL MEDICINE

## 2024-02-20 PROCEDURE — B2111ZZ FLUOROSCOPY OF MULTIPLE CORONARY ARTERIES USING LOW OSMOLAR CONTRAST: ICD-10-PCS | Performed by: STUDENT IN AN ORGANIZED HEALTH CARE EDUCATION/TRAINING PROGRAM

## 2024-02-20 PROCEDURE — 82947 ASSAY GLUCOSE BLOOD QUANT: CPT

## 2024-02-20 PROCEDURE — 84295 ASSAY OF SERUM SODIUM: CPT

## 2024-02-20 PROCEDURE — C9600 PERC DRUG-EL COR STENT SING: HCPCS | Performed by: INTERNAL MEDICINE

## 2024-02-20 PROCEDURE — 93454 CORONARY ARTERY ANGIO S&I: CPT | Performed by: INTERNAL MEDICINE

## 2024-02-20 PROCEDURE — 82435 ASSAY OF BLOOD CHLORIDE: CPT

## 2024-02-20 PROCEDURE — B2151ZZ FLUOROSCOPY OF LEFT HEART USING LOW OSMOLAR CONTRAST: ICD-10-PCS | Performed by: STUDENT IN AN ORGANIZED HEALTH CARE EDUCATION/TRAINING PROGRAM

## 2024-02-20 PROCEDURE — 85014 HEMATOCRIT: CPT

## 2024-02-20 PROCEDURE — 6360000002 HC RX W HCPCS: Performed by: INTERNAL MEDICINE

## 2024-02-20 PROCEDURE — 99152 MOD SED SAME PHYS/QHP 5/>YRS: CPT | Performed by: INTERNAL MEDICINE

## 2024-02-20 PROCEDURE — 84132 ASSAY OF SERUM POTASSIUM: CPT

## 2024-02-20 PROCEDURE — C1725 CATH, TRANSLUMIN NON-LASER: HCPCS | Performed by: INTERNAL MEDICINE

## 2024-02-20 PROCEDURE — 99153 MOD SED SAME PHYS/QHP EA: CPT | Performed by: INTERNAL MEDICINE

## 2024-02-20 PROCEDURE — 6360000004 HC RX CONTRAST MEDICATION: Performed by: INTERNAL MEDICINE

## 2024-02-20 PROCEDURE — 2709999900 HC NON-CHARGEABLE SUPPLY: Performed by: INTERNAL MEDICINE

## 2024-02-20 DEVICE — STENT ONYXNG30015UX ONYX 3.00X15RX
Type: IMPLANTABLE DEVICE | Status: FUNCTIONAL
Brand: ONYX FRONTIER™

## 2024-02-20 RX ORDER — POTASSIUM CHLORIDE 20 MEQ/1
40 TABLET, EXTENDED RELEASE ORAL ONCE
Status: COMPLETED | OUTPATIENT
Start: 2024-02-20 | End: 2024-02-20

## 2024-02-20 RX ORDER — SODIUM CHLORIDE 9 MG/ML
INJECTION, SOLUTION INTRAVENOUS PRN
Status: CANCELLED | OUTPATIENT
Start: 2024-02-20

## 2024-02-20 RX ORDER — SODIUM CHLORIDE 0.9 % (FLUSH) 0.9 %
5-40 SYRINGE (ML) INJECTION EVERY 12 HOURS SCHEDULED
Status: CANCELLED | OUTPATIENT
Start: 2024-02-20

## 2024-02-20 RX ORDER — NITROGLYCERIN 20 MG/100ML
INJECTION INTRAVENOUS PRN
Status: DISCONTINUED | OUTPATIENT
Start: 2024-02-20 | End: 2024-02-20 | Stop reason: HOSPADM

## 2024-02-20 RX ORDER — MIDAZOLAM HYDROCHLORIDE 1 MG/ML
INJECTION INTRAMUSCULAR; INTRAVENOUS PRN
Status: DISCONTINUED | OUTPATIENT
Start: 2024-02-20 | End: 2024-02-20 | Stop reason: HOSPADM

## 2024-02-20 RX ORDER — BIVALIRUDIN 250 MG/5ML
INJECTION, POWDER, LYOPHILIZED, FOR SOLUTION INTRAVENOUS PRN
Status: DISCONTINUED | OUTPATIENT
Start: 2024-02-20 | End: 2024-02-20 | Stop reason: HOSPADM

## 2024-02-20 RX ORDER — SODIUM CHLORIDE 0.9 % (FLUSH) 0.9 %
5-40 SYRINGE (ML) INJECTION PRN
Status: CANCELLED | OUTPATIENT
Start: 2024-02-20

## 2024-02-20 RX ORDER — SODIUM CHLORIDE 9 MG/ML
INJECTION, SOLUTION INTRAVENOUS CONTINUOUS
Status: DISCONTINUED | OUTPATIENT
Start: 2024-02-20 | End: 2024-02-20 | Stop reason: HOSPADM

## 2024-02-20 RX ORDER — POTASSIUM CHLORIDE 20 MEQ/1
20 TABLET, EXTENDED RELEASE ORAL DAILY
Qty: 60 TABLET | Refills: 5 | Status: SHIPPED | OUTPATIENT
Start: 2024-02-20

## 2024-02-20 RX ORDER — HEPARIN SODIUM 1000 [USP'U]/ML
INJECTION, SOLUTION INTRAVENOUS; SUBCUTANEOUS PRN
Status: DISCONTINUED | OUTPATIENT
Start: 2024-02-20 | End: 2024-02-20 | Stop reason: HOSPADM

## 2024-02-20 RX ORDER — ASPIRIN 325 MG
TABLET ORAL PRN
Status: DISCONTINUED | OUTPATIENT
Start: 2024-02-20 | End: 2024-02-20 | Stop reason: HOSPADM

## 2024-02-20 RX ORDER — ACETAMINOPHEN 325 MG/1
650 TABLET ORAL EVERY 4 HOURS PRN
Status: CANCELLED | OUTPATIENT
Start: 2024-02-20

## 2024-02-20 RX ORDER — POTASSIUM CHLORIDE 7.45 MG/ML
10 INJECTION INTRAVENOUS ONCE
Status: COMPLETED | OUTPATIENT
Start: 2024-02-20 | End: 2024-02-20

## 2024-02-20 RX ADMIN — SODIUM CHLORIDE: 9 INJECTION, SOLUTION INTRAVENOUS at 12:59

## 2024-02-20 RX ADMIN — POTASSIUM CHLORIDE 40 MEQ: 1500 TABLET, EXTENDED RELEASE ORAL at 13:10

## 2024-02-20 RX ADMIN — POTASSIUM CHLORIDE 10 MEQ: 7.46 INJECTION, SOLUTION INTRAVENOUS at 13:10

## 2024-02-20 NOTE — PROGRESS NOTES
Patient received post cath to [facility] 7. Assessment obtained.  Post cath pathway initiated. Right radial radial site with vasc band intact. No hematoma noted. Restrictions reviewed with patient, family.  Patient without complaints.

## 2024-02-20 NOTE — PROGRESS NOTES
Patient admitted, consent signed and questions answered. Patient ready for procedure. Call light to reach with side rails up 2 of 2. Bilat groin rt wrist hair clipped with writer and Annia present.  Family at bedside with patient.  History and physical completed..

## 2024-02-20 NOTE — H&P
[facility]  Pre- Procedure History and Physical/Update          Patient Name:  Armando Belcher  MRN:    [id number]  YOB: 1945  Date of evaluation:  2/20/2024    Procedure:                           Cardiac Cath +/- PCI        Indication for procedure:     Abnormal stress test      Please refer to the consult note / H&P completed by Dr. Cintron on 2/6/24 in the medical record and note that:       [x] I have examined the patient and reviewed the H&P/Consult and there are no changes to be made to the assessment or plan.    [] I have examined the patient and reviewed the H&P/Consult and have noted the following changes:        Past Medical History:   Diagnosis Date    Acute gouty arthropathy     Backache, unspecified     Cellulitis and abscess of upper arm and forearm     Degeneration of lumbar or lumbosacral intervertebral disc     Essential hypertension, benign     Obstructive sleep apnea on CPAP     Osteoarthrosis, unspecified whether generalized or localized, unspecified site     Other and unspecified hyperlipidemia     Other malaise and fatigue     Other psoriasis     Sleep apnea     Thoracic or lumbosacral neuritis or radiculitis, unspecified     Unspecified vitamin D deficiency        Past Surgical History:   Procedure Laterality Date    JOINT REPLACEMENT Right Oct.6, 2015    right knee    SHOULDER SURGERY Bilateral rotator cuff    TONSILLECTOMY AND ADENOIDECTOMY      TURP      VASECTOMY          reports that he quit smoking about 53 years ago. His smoking use included cigarettes. He started smoking about 73 years ago. He has a 40.0 pack-year smoking history. He has never used smokeless tobacco. He reports that he does not drink alcohol and does not use drugs.    Prior to Admission medications    Medication Sig Start Date End Date Taking? Authorizing Provider   nitroGLYCERIN (NITROSTAT) 0.4 MG SL tablet PRN for chest pain 2/6/24   James Cintron MD  bed.        Low Risk (Less than 1% annual death or MI)    1. Normal or small myocardial perfusion defect at rest or with stress encumbering less than 5% of the myocardium.    Impression  [Infarct in the lateral wall and inferior.    No stress-induced ischemia.    LVEF 36%.]    Risk stratification: [Intermediate risk.]    Author: Eric Hoffmann MD    Signed by: James Cintron MD on 1/29/2024  9:54 AM, Signed by: Eric Hoffmann MD on 1/29/2024  2:08 PM         Assessment:  Abnormal stress test with Infarct in the lateral wall and inferior and low LVEF of 36% on stress  Dyspnea on exertion  Coronary artery disease with PCI to 1st OM after acute MI in 2017 at [facility]. RCA was noted to be chronically occluded with collaterals.   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     Pre Procedure Conscious Sedation Data:  ASA Class:                  [] I [x] II [] III [] IV     Mallampati Class:       [] I [x] II [] III [] IV      The risks, benefits, and alternatives of the prcoedure were discussed in detail with the patient/family. The patient/family agrees to proceed with the procedure, verbalizes understanding and gave consent.       Karlos Ceja MD  Fellow, Cardiovascular Diseases    [facility]

## 2024-02-21 NOTE — PROGRESS NOTES
Reviewed discharge instructions with patient and wife. Verbalize good [id number] mg brilinta tablet given for AM dose as per dr order. Vasc band off and dressing applied.

## 2024-02-21 NOTE — DISCHARGE INSTRUCTIONS
symptoms such as chest pain or discomfort. If blood flow is completely blocked, you could have a heart attack.  You can slow CAD and reduce the risk of future problems by making changes in your lifestyle. These include quitting smoking and eating heart-healthy foods.  Treatments for CAD, along with changes in your lifestyle, can help you live a longer and healthier life.  How can you prevent coronary artery disease?  Do not smoke. It may be the best thing you can do to prevent heart disease. If you need help quitting, talk to your doctor about stop-smoking programs and medicines. These can increase your chances of quitting for good.  Be active. Get at least 30 minutes of exercise on most days of the week. Walking is a good choice. You also may want to do other activities, such as running, swimming, cycling, or playing tennis or team sports.  Eat heart-healthy foods. Eat more fruits and vegetables and less foods that contain saturated and trans fats. Limit alcohol, sodium, and sweets.  Stay at a healthy weight. Lose weight if you need to.  Manage other health problems such as diabetes, high blood pressure, and high cholesterol.  Talk to your doctor about taking a daily aspirin.  Manage stress. Stress can hurt your heart. To keep stress low, talk about your problems and feelings. Don't keep your feelings hidden.  How is coronary artery disease treated?  Your doctor will suggest that you make lifestyle changes. For example, your doctor may ask you to eat healthy foods, quit smoking, lose extra weight, and be more active.  You will have to take medicines.  Your doctor may suggest a procedure to open narrowed or blocked arteries. This is called angioplasty. Or your doctor may suggest using healthy blood vessels to create detours around narrowed or blocked arteries. This is called bypass surgery.  Follow-up care is a key part of your treatment and safety. Be sure to make and go to all appointments, and call your doctor if  you are having problems. It's also a good idea to know your test results and keep a list of the medicines you take.    Where can you learn more?    Go to [email or web address] and sign in to your [employer] account. Enter C643 in the Search Health Information box to learn more about “Learning About Coronary Artery Disease (CAD).”    If you do not have an account, please click on the “Sign Up Now” link.

## 2024-02-21 NOTE — PROGRESS NOTES
[address]  [place] [address]  Dept: [phone]  Dept Fax: [phone]    Darby Saucedo is a 76 y.o. male who presents today for his medical conditions/complaintsas noted below. Darby Saucedo is c/o of   Chief Complaint   Patient presents with    Back Pain     started PT, review CT scan          HPI:     HTN  Onset more than 2 years ago  oniel mild to mod  Controlled with current po meds  Not associated with headaches or blurry vision  No chest pain    Back pain  Onset more than 1year ago  Severity is moderate to severe  Not associated wt lossbut associated with radiation of pain on the legs  No bowel bladder incontinence   Aggravated with bending and better with pain meds and rest.  Controlled with current pain meds.         Hemoglobin A1C (%)   Date Value   07/03/2013 5.4             ( goal A1Cis < 7)   No results found for: LABMICR  LDL Cholesterol (mg/dL)   Date Value   11/18/2021 71   07/23/2020 70   09/25/2019 61     LDL Calculated (mg/dL)   Date Value   02/11/2016 213 (A)       (goal LDL is <100)   AST (U/L)   Date Value   11/18/2021 20     ALT (U/L)   Date Value   11/18/2021 22     BUN (mg/dL)   Date Value   11/18/2021 17     BP Readings from Last 3 Encounters:   06/23/22 136/84   05/18/22 128/78   04/27/22 (!) 150/102          (goal 120/80)    Past Medical History:   Diagnosis Date    Acute gouty arthropathy     Backache, unspecified     Cellulitis and abscess of upper arm and forearm     Degeneration of lumbar or lumbosacral intervertebral disc     Essential hypertension, benign     Obstructive sleep apnea on CPAP     Osteoarthrosis, unspecified whether generalized or localized, unspecified site     Other and unspecified hyperlipidemia     Other malaise and fatigue     Other psoriasis     Sleep apnea     Thoracic or lumbosacral neuritis or radiculitis, unspecified     Unspecified vitamin D deficiency       Past Surgical Ana GONZALEZ N[place]sack  02/21/2024  [id number]    Leslie Botello MD  Patient Care Team:  Leslie Botello MD as PCP - General (Family Medicine)  Abbie Amor RN as Oncology Navigator  Elle Collins LCSW as  (Hematology and Oncology)  Jose Dela Cruz MD as Consulting Physician (Hematology and Oncology)  Arabella Ferrer LMSW as  (Hematology and Oncology)  Cristina Peters PA-C as Physician Assistant (Internal Medicine)          Visit Type:an urgent visit for a new problem    Chief Complaint:  Chief Complaint   Patient presents with    Cough    Neck Pain       History of Present Illness:  62 yo female presents with co cough, wheezing, neck pain and runny nose for the past 9 days after diagnosis of covid.   Denies throat pain, fever, chills, sweats, CP, SOB, NVD, abdominal pain, fatigue, headache, body aches, loss of taste or smell.   Pt reports she still smokes     History:  Past Medical History:   Diagnosis Date    Allergy     Amblyopia OS    Anxiety     Asthma     COPD (chronic obstructive pulmonary disease)     NO HOME o2    GERD (gastroesophageal reflux disease)     Hyperlipidemia     Hypertension     PONV (postoperative nausea and vomiting)     Thyroid disease      Past Surgical History:   Procedure Laterality Date    APPENDECTOMY      BIOPSY N/A 06/08/2023    Procedure: BIOPSY;  Surgeon: Fausto Smith MD;  Location: [facility] OR;  Service: Neurosurgery;  Laterality: N/A;  L1    BUNIONECTOMY      COLONOSCOPY N/A 12/04/2019    Procedure: COLONOSCOPY;  Surgeon: Danny Matos III, MD;  Location: [facility] ENDO;  Service: Endoscopy;  Laterality: N/A;    COLONOSCOPY N/A 10/24/2022    Procedure: COLONOSCOPY;  Surgeon: Danny Matos III, MD;  Location: [facility] ENDO;  Service: Endoscopy;  Laterality: N/A;    ESOPHAGOGASTRODUODENOSCOPY N/A 10/24/2022    Procedure: EGD (ESOPHAGOGASTRODUODENOSCOPY);  Surgeon: Danny Matos III, MD;  Location: [facility];  Service: Endoscopy;  Laterality:  History:   Procedure Laterality Date    JOINT REPLACEMENT Right Oct.6, 2015    right knee    SHOULDER SURGERY Bilateral rotator cuff    TONSILLECTOMY AND ADENOIDECTOMY      TRANSURETHRAL RESECTION OF PROSTATE      VASECTOMY         Family History   Problem Relation Age of Onset    Cancer Father        Social History     Tobacco Use    Smoking status: Former Smoker     Packs/day: 2.00     Years: 20.00     Pack years: 40.00     Quit date: 1970     Years since quittin.6    Smokeless tobacco: Never Used   Substance Use Topics    Alcohol use: No     Alcohol/week: 0.0 standard drinks      Current Outpatient Medications   Medication Sig Dispense Refill    gabapentin (NEURONTIN) 300 MG capsule Take 1 capsule by mouth nightly for 180 days. Intended supply: 90 days 30 capsule 1    carvedilol (COREG) 12.5 MG tablet TAKE 1 TABLET BY MOUTH TWICE DAILY WITH MEALS 180 tablet 0    nitroGLYCERIN (NITROSTAT) 0.4 MG SL tablet       lisinopril-hydroCHLOROthiazide (PRINZIDE;ZESTORETIC) 20-25 MG per tablet Take 1 tablet by mouth 2 times daily 180 tablet 3    vitamin C (ASCORBIC ACID) 500 MG tablet Take 500 mg by mouth daily      vitamin E 400 UNIT capsule Take 400 Units by mouth daily      magnesium (MAGNESIUM-OXIDE) 250 MG TABS tablet Take 250 mg by mouth daily      zinc 50 MG CAPS Take by mouth      TURMERIC PO Take by mouth      MULTIPLE VITAMIN PO Take by mouth daily      aspirin 81 MG tablet Take 81 mg by mouth daily.  tamsulosin (FLOMAX) 0.4 MG capsule Take 0.4 mg by mouth daily.  dutasteride (AVODART) 0.5 MG capsule Take 0.5 mg by mouth daily.  Vitamin D (CHOLECALCIFEROL) 1000 UNITS CAPS capsule Take 10,000 Units by mouth daily.  atorvastatin (LIPITOR) 80 MG tablet Take 1 tablet by mouth once daily (Patient not taking: Reported on 2022) 90 tablet 0     No current facility-administered medications for this visit.      Allergies   Allergen Reactions    Mobic [Meloxicam] N/A;    FIXATION KYPHOPLASTY Bilateral 2023    Procedure: KYPHOPLASTY;  Surgeon: Fausto Smith MD;  Location: [facility] OR;  Service: Neurosurgery;  Laterality: Bilateral;  kyphoplasty and radiofrequency ablation - L1    HYSTERECTOMY      PARTIAL//still with ovaries    neck fusion  2017    THYROIDECTOMY      TUBAL LIGATION       Family History   Problem Relation Age of Onset    Hypertension Mother     Diabetes Mother     Asthma Mother             Diabetes Father     Asthma Father     Stroke Maternal Grandmother     Prostate cancer Maternal Grandfather     Mental illness Son     Pancreatic cancer Maternal Uncle     Mental illness Other     Pancreatic cancer Other     Ovarian cancer Maternal Cousin      Social History     Socioeconomic History    Marital status:     Number of children: 2   Occupational History     Employer: [employer]   Tobacco Use    Smoking status: Every Day     Current packs/day: 0.50     Average packs/day: 0.5 packs/day for 50.0 years (25.0 ttl pk-yrs)     Types: Cigarettes     Passive exposure: Never    Smokeless tobacco: Never   Substance and Sexual Activity    Alcohol use: Not Currently     Comment: quit    Drug use: No    Sexual activity: Not Currently     Partners: Male     Social Determinants of Health     Financial Resource Strain: Low Risk  (11/15/2023)    Overall Financial Resource Strain (CARDIA)     Difficulty of Paying Living Expenses: Not hard at all   Food Insecurity: No Food Insecurity (11/15/2023)    Hunger Vital Sign     Worried About Running Out of Food in the Last Year: Never true     Ran Out of Food in the Last Year: Never true   Transportation Needs: No Transportation Needs (11/15/2023)    PRAPARE - Transportation     Lack of Transportation (Medical): No     Lack of Transportation (Non-Medical): No   Physical Activity: Sufficiently Active (11/15/2023)    Exercise Vital Sign     Days of Exercise per Week: 7 days     Minutes of Exercise per Session: 150+ min    Stress: Stress Concern Present (11/15/2023)    [employer] [place] [employer] - Occupational Stress Questionnaire     Feeling of Stress : To some extent   Social Connections: Unknown (11/15/2023)    Social Connection and Isolation Panel [NHANES]     Frequency of Communication with Friends and Family: More than three times a week     Frequency of Social Gatherings with Friends and Family: Three times a week     Active Member of Clubs or Organizations: No     Attends Club or Organization Meetings: Patient declined     Marital Status:    Housing Stability: Low Risk  (11/15/2023)    Housing Stability Vital Sign     Unable to Pay for Housing in the Last Year: No     Number of Places Lived in the Last Year: 1     Unstable Housing in the Last Year: No     Patient Active Problem List   Diagnosis    COPD with asthma    GERD (gastroesophageal reflux disease)    Acquired hypothyroidism    Tobacco use    PVD (peripheral vascular disease)    Anxiety    Dyslipidemia    Claudication    Essential hypertension    Allergic rhinitis    Pain in both lower extremities    Anisometropic amblyopia of left eye    Nonrheumatic aortic valve insufficiency    Colon polyps    Precordial pain    Tachycardia    Pain of right lower extremity    Difficulty walking    Low back pain, unspecified    Multiple myeloma    Compressed spine fracture    Dehydration    Immunodeficiency due to chemotherapy    Secondary malignant neoplasm of bone    Hypokalemia    Encounter for antineoplastic chemotherapy    Chalazion left upper eyelid    Hypocalcemia    Sacroiliitis     Review of patient's allergies indicates:   Allergen Reactions    Hydrocodone-acetaminophen Other (See Comments)     Causes pt to feel extremely sick        The following were reviewed at this visit: active problem list, medication list, allergies, family history, social history, and health maintenance.    Medications:  Current Outpatient Medications on File Prior to Visit  Shortness Of Breath    Nsaids Shortness Of Breath       Health Maintenance   Topic Date Due    Lipids  11/18/2022    Depression Screen  01/18/2023    Annual Wellness Visit (AWV)  01/19/2023    DTaP/Tdap/Td vaccine (2 - Td or Tdap) 11/18/2031    Flu vaccine  Completed    Shingles vaccine  Completed    Pneumococcal 65+ years Vaccine  Completed    COVID-19 Vaccine  Completed    Hepatitis C screen  Completed    Hepatitis A vaccine  Aged Out    Hepatitis B vaccine  Aged Out    Hib vaccine  Aged Out    Meningococcal (ACWY) vaccine  Aged Out       Subjective:     Review of Systems   Constitutional: Negative for appetite change, diaphoresis and fatigue. HENT: Negative for ear discharge and trouble swallowing. Eyes: Negative for pain and discharge. Respiratory: Negative for cough, shortness of breath and wheezing. Cardiovascular: Negative for chest pain and palpitations. Gastrointestinal: Negative for abdominal distention, blood in stool and diarrhea. Endocrine: Negative for polydipsia and polyphagia. Genitourinary: Negative for difficulty urinating and frequency. Musculoskeletal: Positive for arthralgias and back pain. Negative for gait problem, myalgias and neck pain. Skin: Negative for color change and rash. Allergic/Immunologic: Negative for environmental allergies and food allergies. Neurological: Negative for dizziness and headaches. Hematological: Negative for adenopathy. Does not bruise/bleed easily. Psychiatric/Behavioral: Negative for behavioral problems and sleep disturbance. Objective:     Physical Exam  Constitutional:       Appearance: He is well-developed. He is not diaphoretic. HENT:      Head: Normocephalic and atraumatic. Eyes:      General:         Right eye: No discharge. Left eye: No discharge. Extraocular Movements:      Right eye: Normal extraocular motion. Left eye: Normal extraocular motion.       Conjunctiva/sclera: Conjunctivae   Medication Sig Dispense Refill    acyclovir (ZOVIRAX) 400 MG tablet Take 1 tablet (400 mg total) by mouth 2 (two) times daily. 60 tablet 11    albuterol (PROVENTIL HFA) 90 mcg/actuation inhaler Inhale 2 puffs into the lungs every 6 (six) hours as needed for Wheezing. Rescue 18 g 0    ALPRAZolam (XANAX) 2 MG Tab TAKE 1 TABLET BY MOUTH TWICE DAILY AS NEEDED FOR ANXIETY 60 tablet 0    amlodipine-benazepril 2.5-10 mg (LOTREL) 2.5-10 mg per capsule TAKE 1 CAPSULE BY MOUTH EVERY DAY 90 capsule 3    aspirin (ECOTRIN) 81 MG EC tablet Take 1 tablet (81 mg total) by mouth once daily. 30 tablet 5    calcium-vitamin D 600 mg-10 mcg (400 unit) Tab Take 1 tablet by mouth every 12 (twelve) hours. 60 tablet 2    dexAMETHasone (DECADRON) 4 MG Tab Take 10 tablets (40 mg total) by mouth As instructed. Daily on days 1, 8, 15, and 22 of each chemotherapy cycle. Take with food. 40 tablet 5    erythromycin (ROMYCIN) ophthalmic ointment Apply ointment to lids and lashes on both eyes 2 times daily for 7 days. 2.5 g 0    fluticasone propionate (FLONASE) 50 mcg/actuation nasal spray 1 spray (50 mcg total) by Each Nostril route once daily. 1 mL 1    fluticasone-salmeterol diskus inhaler 250-50 mcg Inhale 1 puff into the lungs 2 (two) times daily. Controller 180 each 1    ipratropium (ATROVENT) 21 mcg (0.03 %) nasal spray 2 sprays by Each Nostril route 3 (three) times daily.      lenalidomide 10 mg Cap TAKE 1 CAPSULE ONCE DAILY FOR 21 DAYS AND THEN 7 DAYS OFF 21 each 5    levocetirizine (XYZAL) 5 MG tablet Take 1 tablet (5 mg total) by mouth every evening. 30 tablet 11    levothyroxine (SYNTHROID) 100 MCG tablet TAKE 1 TABLET(100 MCG) BY MOUTH BEFORE BREAKFAST 90 tablet 1    linaCLOtide (LINZESS) 72 mcg Cap capsule Take 2 capsules (144 mcg total) by mouth before breakfast. 30 capsule 1    magnesium oxide (MAGOX) 400 mg (241.3 mg magnesium) tablet Take 1 tablet (400 mg total) by mouth once daily. 90 tablet 1    metoprolol succinate (TOPROL-XL)  normal.      Right eye: Right conjunctiva is not injected. Left eye: Left conjunctiva is not injected. Neck:      Thyroid: No thyroid mass or thyromegaly. Vascular: No JVD. Cardiovascular:      Rate and Rhythm: Normal rate and regular rhythm. Heart sounds: No murmur heard. No friction rub. Pulmonary:      Effort: Pulmonary effort is normal. No tachypnea, bradypnea, accessory muscle usage or respiratory distress. Breath sounds: Normal breath sounds. No wheezing or rales. Abdominal:      General: Bowel sounds are normal. There is no distension. Palpations: Abdomen is soft. Tenderness: There is no abdominal tenderness. There is no rebound. Musculoskeletal:         General: No tenderness. Normal range of motion. Cervical back: Normal range of motion and neck supple. No edema or erythema. Comments: Limited back range of movement     Lymphadenopathy:      Head:      Right side of head: No submental or submandibular adenopathy. Left side of head: No submental or submandibular adenopathy. Cervical: No cervical adenopathy. Skin:     General: Skin is warm. Coloration: Skin is not pale. Findings: No bruising, ecchymosis or rash. Neurological:      Mental Status: He is alert and oriented to person, place, and time. Cranial Nerves: No cranial nerve deficit. Sensory: No sensory deficit. Motor: No atrophy or abnormal muscle tone. Coordination: Coordination normal.   Psychiatric:         Mood and Affect: Mood is not anxious. Affect is not angry. Speech: Speech is not slurred. Behavior: Behavior normal. Behavior is not aggressive. Thought Content: Thought content does not include homicidal ideation. Cognition and Memory: Memory is not impaired. /84 (Site: Left Upper Arm)   Wt 232 lb (105.2 kg)   BMI 33.29 kg/m²     Assessment:       Diagnosis Orders   1.  Spinal stenosis of lumbar region at 50 MG 24 hr tablet Take 1 tablet (50 mg total) by mouth every evening. 90 tablet 3    montelukast (SINGULAIR) 10 mg tablet TAKE 1 TABLET(10 MG) BY MOUTH EVERY EVENING 90 tablet 0    neomycin-polymyxin-dexamethasone (DEXACINE) 3.5 mg/g-10,000 unit/g-0.1 % Oint Place into both eyes 3 (three) times daily. 3.5 g 0    ondansetron (ZOFRAN) 8 MG tablet Take 1 tablet (8 mg total) by mouth every 12 (twelve) hours as needed for Nausea. 30 tablet 2    oxyCODONE-acetaminophen (PERCOCET) 7.5-325 mg per tablet Take 1 tablet by mouth every 4 (four) hours as needed for Pain. 45 tablet 0    pantoprazole (PROTONIX) 40 MG tablet TAKE 1 TABLET(40 MG) BY MOUTH EVERY DAY (Patient taking differently: Take 40 mg by mouth daily as needed.) 90 tablet 3    potassium chloride SA (K-DUR,KLOR-CON) 20 MEQ tablet Take 1 tablet (20 mEq total) by mouth once daily. 30 tablet 11    prochlorperazine (COMPAZINE) 5 MG tablet Take 1 tablet (5 mg total) by mouth every 6 (six) hours as needed for Nausea. 60 tablet 5    promethazine (PHENERGAN) 25 MG tablet Take 1 tablet (25 mg total) by mouth every 4 (four) hours. 45 tablet 2    rosuvastatin (CRESTOR) 10 MG tablet Take 1 tablet (10 mg total) by mouth every evening. 90 tablet 3    [DISCONTINUED] benzonatate (TESSALON) 200 MG capsule Take 200 mg by mouth.      methylPREDNISolone (MEDROL DOSEPACK) 4 mg tablet use as directed (Patient not taking: Reported on 2/21/2024) 1 each 0    nicotine (NICODERM CQ) 14 mg/24 hr Place 1 patch onto the skin once daily. (Patient not taking: Reported on 2/21/2024) 14 patch 0    pregabalin (LYRICA) 50 MG capsule Take 1 capsule (50 mg total) by mouth nightly. (Patient not taking: Reported on 12/19/2023) 30 capsule 0    traZODone (DESYREL) 50 MG tablet Take 1 tablet (50 mg total) by mouth every evening. 30 tablet 11     Current Facility-Administered Medications on File Prior to Visit   Medication Dose Route Frequency Provider Last Rate Last Admin    dexAMETHasone injection 40 mg   multiple levels     2. Vitamin D deficiency     3. Malaise and fatigue     4. Essential hypertension     5. Mixed dyslipidemia               Plan:      No follow-ups on file. No orders of the defined types were placed in this encounter. No orders of the defined types were placed in this encounter. lumbar ct noted  Offer to ref to pain mx  And or spinal surg  Pt d declined both  Wants to continue PT  Pt stopped statin for aches and pain  Pt will try to restart as no improvement on stopping     Patient given educational materials - see patient instructions. Discussed use, benefit, and side effects of prescribed medications. All patientquestions answered. Pt voiced understanding. Reviewed health maintenance. Instructedto continue current medications, diet and exercise. Patient agreed with treatmentplan. Follow up as directed. Please note that this chart was generated using voice recognition Dragon dictation software. Although every effort was made to ensure the accuracy of this automated transcription, some errors in transcription may have occurred.      Electronically signed by Josy Castillo MD on 6/23/2022 at 2:09 PM 40 mg Intravenous 1 time in Clinic/HOD Bri Luevano MD        lactated ringers infusion   Intravenous Continuous Danny Matos III, MD           Medications have been reviewed and reconciled with patient at this visit.  Barriers to medications reviewed with patient.    Adverse reactions to current medications reviewed with patient..    Over the counter medications reviewed and reconciled with patient.    Exam:  Wt Readings from Last 3 Encounters:   02/21/24 61.3 kg (135 lb 2.3 oz)   02/19/24 58.5 kg (129 lb)   02/07/24 62.1 kg (136 lb 14.5 oz)     Temp Readings from Last 3 Encounters:   02/21/24 97.2 °F (36.2 °C) (Tympanic)   02/07/24 97.6 °F (36.4 °C)   01/24/24 97.2 °F (36.2 °C)     BP Readings from Last 3 Encounters:   02/21/24 124/70   02/19/24 130/60   02/07/24 127/74     Pulse Readings from Last 3 Encounters:   02/21/24 77   02/19/24 70   02/07/24 70     Body mass index is 23.2 kg/m².      Review of Systems   Constitutional:  Negative for chills, fever and weight loss.   HENT:  Positive for congestion. Negative for ear pain and sore throat.    Respiratory:  Positive for cough, shortness of breath and wheezing. Negative for hemoptysis.    Cardiovascular:  Negative for chest pain.   Gastrointestinal:  Negative for heartburn.   Musculoskeletal:  Positive for myalgias and neck pain.   Skin:  Negative for rash.   Neurological:  Negative for headaches.     Physical Exam  Vitals and nursing note reviewed.   Constitutional:       Appearance: Normal appearance. She is normal weight.   HENT:      Head: Normocephalic and atraumatic.      Right Ear: Tympanic membrane, ear canal and external ear normal.      Left Ear: Tympanic membrane, ear canal and external ear normal.      Nose: Congestion and rhinorrhea present.      Mouth/Throat:      Mouth: Mucous membranes are moist.      Pharynx: Oropharynx is clear.   Eyes:      Extraocular Movements: Extraocular movements intact.      Conjunctiva/sclera: Conjunctivae  normal.      Pupils: Pupils are equal, round, and reactive to light.   Cardiovascular:      Rate and Rhythm: Normal rate and regular rhythm.      Pulses: Normal pulses.      Heart sounds: Normal heart sounds.   Pulmonary:      Effort: Pulmonary effort is normal.      Breath sounds: Wheezing and rhonchi present.   Abdominal:      General: Bowel sounds are normal.      Palpations: Abdomen is soft.   Musculoskeletal:         General: Normal range of motion.        Arms:       Cervical back: Normal range of motion and neck supple. Tenderness present.   Skin:     General: Skin is warm and dry.      Capillary Refill: Capillary refill takes less than 2 seconds.   Neurological:      General: No focal deficit present.      Mental Status: She is alert and oriented to person, place, and time.   Psychiatric:         Mood and Affect: Mood normal.         Behavior: Behavior normal. Behavior is cooperative.         Thought Content: Thought content normal.         Judgment: Judgment normal.         Laboratory Reviewed ({Yes)  Lab Results   Component Value Date    WBC 4.73 02/06/2024    HGB 12.2 02/06/2024    HCT 36.6 (L) 02/06/2024     02/06/2024    CHOL 176 08/25/2022    TRIG 47 08/25/2022    HDL 74 08/25/2022    ALT 14 02/19/2024    AST 20 02/19/2024     02/19/2024    K 2.9 (L) 02/19/2024     02/19/2024    CREATININE 1.1 02/19/2024    BUN 9 02/19/2024    CO2 28 02/19/2024    TSH 1.067 11/21/2023    INR 0.9 02/19/2024    HGBA1C 5.7 (H) 01/10/2023       Ana was seen today for cough and neck pain.    Diagnoses and all orders for this visit:    Sacroiliitis    PVD (peripheral vascular disease)    Neck pain  -     ketorolac injection 60 mg    Runny nose  -     dexbrompheniramine-phenyleph (ALAHIST PE) 2-7.5 mg Tab; Take 1 tablet by mouth every 6 (six) hours as needed (sinus congestion).    COVID-19    Cough, unspecified type  -     benzonatate (TESSALON) 200 MG capsule; Take 1 capsule (200 mg total) by mouth 3  (three) times daily as needed for Cough.        Plan  Alahist   Ketorolac injection  Tessalon   [id number]          Care Plan/Goals: Reviewed    Goals    None       Ana was seen today for cough and neck pain.    Diagnoses and all orders for this visit:    Sacroiliitis    PVD (peripheral vascular disease)    Neck pain  -     ketorolac injection 60 mg    Runny nose  -     dexbrompheniramine-phenyleph (ALAHIST PE) 2-7.5 mg Tab; Take 1 tablet by mouth every 6 (six) hours as needed (sinus congestion).    COVID-19    Cough, unspecified type  -     benzonatate (TESSALON) 200 MG capsule; Take 1 capsule (200 mg total) by mouth 3 (three) times daily as needed for Cough.       Follow up: Follow up if symptoms worsen or fail to improve.      After visit summary was printed and given to patient upon discharge today.  Patient goals and care plan are included in After Visit Summary.

## 2024-02-22 ENCOUNTER — APPOINTMENT (OUTPATIENT)
Dept: CT IMAGING | Age: 79
DRG: 322 | End: 2024-02-22
Payer: MEDICARE

## 2024-02-22 ENCOUNTER — HOSPITAL ENCOUNTER (INPATIENT)
Age: 79
LOS: 3 days | Discharge: HOME | DRG: 322 | End: 2024-02-25
Attending: EMERGENCY MEDICINE | Admitting: INTERNAL MEDICINE
Payer: MEDICARE

## 2024-02-22 ENCOUNTER — APPOINTMENT (OUTPATIENT)
Dept: GENERAL RADIOLOGY | Age: 79
DRG: 322 | End: 2024-02-22
Payer: MEDICARE

## 2024-02-22 DIAGNOSIS — R06.02 SHORTNESS OF BREATH: Primary | ICD-10-CM

## 2024-02-22 PROBLEM — R06.09 EXERTIONAL DYSPNEA: Status: ACTIVE | Noted: 2024-02-22

## 2024-02-22 LAB
ANION GAP SERPL CALCULATED.3IONS-SCNC: 15 MMOL/L (ref 9–17)
ARTERIAL PATENCY WRIST A: ABNORMAL
BASOPHILS # BLD: 0 K/UL (ref 0–0.2)
BASOPHILS NFR BLD: 1 % (ref 0–2)
BDY SITE: ABNORMAL
BNP SERPL-MCNC: 316 PG/ML
BODY TEMPERATURE: 37
BUN SERPL-MCNC: 15 MG/DL (ref 8–23)
CALCIUM SERPL-MCNC: 9.5 MG/DL (ref 8.6–10.4)
CHLORIDE SERPL-SCNC: 96 MMOL/L (ref 98–107)
CO2 SERPL-SCNC: 25 MMOL/L (ref 20–31)
COHGB MFR BLD: 2.4 % (ref 0–5)
CREAT SERPL-MCNC: 0.7 MG/DL (ref 0.7–1.2)
EKG ATRIAL RATE: 72 BPM
EKG P AXIS: 29 DEGREES
EKG P-R INTERVAL: 192 MS
EKG Q-T INTERVAL: 464 MS
EKG QRS DURATION: 118 MS
EKG QTC CALCULATION (BAZETT): 508 MS
EKG R AXIS: -14 DEGREES
EKG T AXIS: 80 DEGREES
EKG VENTRICULAR RATE: 72 BPM
EOSINOPHIL # BLD: 0.3 K/UL (ref 0–0.4)
EOSINOPHILS RELATIVE PERCENT: 4 % (ref 0–4)
ERYTHROCYTE [DISTWIDTH] IN BLOOD BY AUTOMATED COUNT: 15.3 % (ref 11.5–14.9)
GFR SERPL CREATININE-BSD FRML MDRD: >60 ML/MIN/1.73M2
GLUCOSE SERPL-MCNC: 102 MG/DL (ref 70–99)
HCO3 VENOUS: 26 MMOL/L (ref 24–30)
HCT VFR BLD AUTO: 40.1 % (ref 41–53)
HGB BLD-MCNC: 13.2 G/DL (ref 13.5–17.5)
INR PPP: 1
LYMPHOCYTES NFR BLD: 1.6 K/UL (ref 1–4.8)
LYMPHOCYTES RELATIVE PERCENT: 21 % (ref 24–44)
MAGNESIUM SERPL-MCNC: 2 MG/DL (ref 1.6–2.6)
MCH RBC QN AUTO: 27 PG (ref 26–34)
MCHC RBC AUTO-ENTMCNC: 33 G/DL (ref 31–37)
MCV RBC AUTO: 82 FL (ref 80–100)
METHEMOGLOBIN: 0.7 % (ref 0–1.9)
MONOCYTES NFR BLD: 0.7 K/UL (ref 0.1–1.3)
MONOCYTES NFR BLD: 9 % (ref 1–7)
NEUTROPHILS NFR BLD: 65 % (ref 36–66)
NEUTS SEG NFR BLD: 5.1 K/UL (ref 1.3–9.1)
O2 SAT, VEN: 91.1 % (ref 60–85)
PARTIAL THROMBOPLASTIN TIME: 26.3 SEC (ref 24–36)
PCO2, VEN: 39.1 MM HG (ref 39–55)
PH VENOUS: 7.43 (ref 7.32–7.42)
PHOSPHATE SERPL-MCNC: 3 MG/DL (ref 2.5–4.5)
PLATELET # BLD AUTO: 225 K/UL (ref 150–450)
PMV BLD AUTO: 7.4 FL (ref 6–12)
PO2, VEN: 66.7 MM HG (ref 30–50)
POSITIVE BASE EXCESS, VEN: 1.7 MMOL/L (ref 0–2)
POTASSIUM SERPL-SCNC: 3.5 MMOL/L (ref 3.7–5.3)
PROTHROMBIN TIME: 13.3 SEC (ref 11.8–14.6)
RBC # BLD AUTO: 4.89 M/UL (ref 4.5–5.9)
SODIUM SERPL-SCNC: 136 MMOL/L (ref 135–144)
TROPONIN I SERPL HS-MCNC: 25 NG/L (ref 0–22)
TROPONIN I SERPL HS-MCNC: 26 NG/L (ref 0–22)
WBC OTHER # BLD: 7.9 K/UL (ref 3.5–11)

## 2024-02-22 PROCEDURE — 70450 CT HEAD/BRAIN W/O DYE: CPT

## 2024-02-22 PROCEDURE — 93010 ELECTROCARDIOGRAM REPORT: CPT | Performed by: INTERNAL MEDICINE

## 2024-02-22 PROCEDURE — 93005 ELECTROCARDIOGRAM TRACING: CPT | Performed by: EMERGENCY MEDICINE

## 2024-02-22 PROCEDURE — 2580000003 HC RX 258: Performed by: INTERNAL MEDICINE

## 2024-02-22 PROCEDURE — 85730 THROMBOPLASTIN TIME PARTIAL: CPT

## 2024-02-22 PROCEDURE — 82800 BLOOD PH: CPT

## 2024-02-22 PROCEDURE — 36415 COLL VENOUS BLD VENIPUNCTURE: CPT

## 2024-02-22 PROCEDURE — 71045 X-RAY EXAM CHEST 1 VIEW: CPT

## 2024-02-22 PROCEDURE — 82805 BLOOD GASES W/O2 SATURATION: CPT

## 2024-02-22 PROCEDURE — 84484 ASSAY OF TROPONIN QUANT: CPT

## 2024-02-22 PROCEDURE — 99223 1ST HOSP IP/OBS HIGH 75: CPT | Performed by: INTERNAL MEDICINE

## 2024-02-22 PROCEDURE — 83880 ASSAY OF NATRIURETIC PEPTIDE: CPT

## 2024-02-22 PROCEDURE — 1200000000 HC SEMI PRIVATE

## 2024-02-22 PROCEDURE — 80048 BASIC METABOLIC PNL TOTAL CA: CPT

## 2024-02-22 PROCEDURE — 6370000000 HC RX 637 (ALT 250 FOR IP): Performed by: INTERNAL MEDICINE

## 2024-02-22 PROCEDURE — 6360000002 HC RX W HCPCS: Performed by: INTERNAL MEDICINE

## 2024-02-22 PROCEDURE — 85025 COMPLETE CBC W/AUTO DIFF WBC: CPT

## 2024-02-22 PROCEDURE — 85610 PROTHROMBIN TIME: CPT

## 2024-02-22 PROCEDURE — 84100 ASSAY OF PHOSPHORUS: CPT

## 2024-02-22 PROCEDURE — 83735 ASSAY OF MAGNESIUM: CPT

## 2024-02-22 PROCEDURE — 99285 EMERGENCY DEPT VISIT HI MDM: CPT

## 2024-02-22 RX ORDER — ATORVASTATIN CALCIUM 80 MG/1
80 TABLET, FILM COATED ORAL NIGHTLY
COMMUNITY

## 2024-02-22 RX ORDER — VITAMIN E 268 MG
400 CAPSULE ORAL NIGHTLY
Status: DISCONTINUED | OUTPATIENT
Start: 2024-02-22 | End: 2024-02-25 | Stop reason: HOSPADM

## 2024-02-22 RX ORDER — SODIUM CHLORIDE 0.9 % (FLUSH) 0.9 %
5-40 SYRINGE (ML) INJECTION EVERY 12 HOURS SCHEDULED
Status: DISCONTINUED | OUTPATIENT
Start: 2024-02-22 | End: 2024-02-25 | Stop reason: HOSPADM

## 2024-02-22 RX ORDER — CARVEDILOL 25 MG/1
25 TABLET ORAL 2 TIMES DAILY WITH MEALS
Status: DISCONTINUED | OUTPATIENT
Start: 2024-02-22 | End: 2024-02-25 | Stop reason: HOSPADM

## 2024-02-22 RX ORDER — FUROSEMIDE 20 MG/1
20 TABLET ORAL DAILY
COMMUNITY
End: 2024-02-22 | Stop reason: ALTCHOICE

## 2024-02-22 RX ORDER — SODIUM CHLORIDE 9 MG/ML
INJECTION, SOLUTION INTRAVENOUS PRN
Status: DISCONTINUED | OUTPATIENT
Start: 2024-02-22 | End: 2024-02-25 | Stop reason: HOSPADM

## 2024-02-22 RX ORDER — POTASSIUM CHLORIDE 20 MEQ/1
40 TABLET, EXTENDED RELEASE ORAL PRN
Status: DISCONTINUED | OUTPATIENT
Start: 2024-02-22 | End: 2024-02-25 | Stop reason: HOSPADM

## 2024-02-22 RX ORDER — HYDRALAZINE HYDROCHLORIDE 20 MG/ML
10 INJECTION INTRAMUSCULAR; INTRAVENOUS EVERY 6 HOURS PRN
Status: DISCONTINUED | OUTPATIENT
Start: 2024-02-22 | End: 2024-02-25 | Stop reason: HOSPADM

## 2024-02-22 RX ORDER — HYDROCHLOROTHIAZIDE 25 MG/1
25 TABLET ORAL 2 TIMES DAILY
Status: DISCONTINUED | OUTPATIENT
Start: 2024-02-22 | End: 2024-02-23

## 2024-02-22 RX ORDER — FINASTERIDE 5 MG/1
5 TABLET, FILM COATED ORAL DAILY
Status: DISCONTINUED | OUTPATIENT
Start: 2024-02-23 | End: 2024-02-25 | Stop reason: HOSPADM

## 2024-02-22 RX ORDER — ACETAMINOPHEN 325 MG/1
650 TABLET ORAL EVERY 6 HOURS PRN
Status: DISCONTINUED | OUTPATIENT
Start: 2024-02-22 | End: 2024-02-25 | Stop reason: HOSPADM

## 2024-02-22 RX ORDER — LISINOPRIL 20 MG/1
20 TABLET ORAL 2 TIMES DAILY
Status: DISCONTINUED | OUTPATIENT
Start: 2024-02-22 | End: 2024-02-25 | Stop reason: HOSPADM

## 2024-02-22 RX ORDER — ENOXAPARIN SODIUM 100 MG/ML
30 INJECTION SUBCUTANEOUS 2 TIMES DAILY
Status: DISCONTINUED | OUTPATIENT
Start: 2024-02-22 | End: 2024-02-25 | Stop reason: HOSPADM

## 2024-02-22 RX ORDER — TAMSULOSIN HYDROCHLORIDE 0.4 MG/1
0.4 CAPSULE ORAL NIGHTLY
Status: DISCONTINUED | OUTPATIENT
Start: 2024-02-22 | End: 2024-02-25 | Stop reason: HOSPADM

## 2024-02-22 RX ORDER — ONDANSETRON 4 MG/1
4 TABLET, ORALLY DISINTEGRATING ORAL EVERY 8 HOURS PRN
Status: DISCONTINUED | OUTPATIENT
Start: 2024-02-22 | End: 2024-02-25 | Stop reason: HOSPADM

## 2024-02-22 RX ORDER — ASCORBIC ACID 500 MG
1000 TABLET ORAL NIGHTLY
Status: DISCONTINUED | OUTPATIENT
Start: 2024-02-22 | End: 2024-02-25 | Stop reason: HOSPADM

## 2024-02-22 RX ORDER — ACETAMINOPHEN 650 MG/1
650 SUPPOSITORY RECTAL EVERY 6 HOURS PRN
Status: DISCONTINUED | OUTPATIENT
Start: 2024-02-22 | End: 2024-02-25 | Stop reason: HOSPADM

## 2024-02-22 RX ORDER — POTASSIUM CHLORIDE 7.45 MG/ML
10 INJECTION INTRAVENOUS PRN
Status: DISCONTINUED | OUTPATIENT
Start: 2024-02-22 | End: 2024-02-25 | Stop reason: HOSPADM

## 2024-02-22 RX ORDER — ONDANSETRON 2 MG/ML
4 INJECTION INTRAMUSCULAR; INTRAVENOUS EVERY 6 HOURS PRN
Status: DISCONTINUED | OUTPATIENT
Start: 2024-02-22 | End: 2024-02-25 | Stop reason: HOSPADM

## 2024-02-22 RX ORDER — LANOLIN ALCOHOL/MO/W.PET/CERES
200 CREAM (GRAM) TOPICAL NIGHTLY
Status: DISCONTINUED | OUTPATIENT
Start: 2024-02-22 | End: 2024-02-25 | Stop reason: HOSPADM

## 2024-02-22 RX ORDER — MAGNESIUM SULFATE HEPTAHYDRATE 40 MG/ML
2000 INJECTION, SOLUTION INTRAVENOUS PRN
Status: DISCONTINUED | OUTPATIENT
Start: 2024-02-22 | End: 2024-02-25 | Stop reason: HOSPADM

## 2024-02-22 RX ORDER — LISINOPRIL AND HYDROCHLOROTHIAZIDE 25; 20 MG/1; MG/1
1 TABLET ORAL 2 TIMES DAILY
Status: DISCONTINUED | OUTPATIENT
Start: 2024-02-22 | End: 2024-02-22 | Stop reason: CLARIF

## 2024-02-22 RX ORDER — ASPIRIN 81 MG/1
81 TABLET ORAL DAILY
Status: DISCONTINUED | OUTPATIENT
Start: 2024-02-23 | End: 2024-02-25 | Stop reason: HOSPADM

## 2024-02-22 RX ORDER — SODIUM CHLORIDE 0.9 % (FLUSH) 0.9 %
5-40 SYRINGE (ML) INJECTION PRN
Status: DISCONTINUED | OUTPATIENT
Start: 2024-02-22 | End: 2024-02-25 | Stop reason: HOSPADM

## 2024-02-22 RX ORDER — FAMOTIDINE 20 MG/1
20 TABLET, FILM COATED ORAL DAILY
COMMUNITY
End: 2024-02-22 | Stop reason: ALTCHOICE

## 2024-02-22 RX ORDER — POLYETHYLENE GLYCOL 3350 17 G/17G
17 POWDER, FOR SOLUTION ORAL DAILY PRN
Status: DISCONTINUED | OUTPATIENT
Start: 2024-02-22 | End: 2024-02-25 | Stop reason: HOSPADM

## 2024-02-22 RX ORDER — ATORVASTATIN CALCIUM 80 MG/1
80 TABLET, FILM COATED ORAL NIGHTLY
Status: DISCONTINUED | OUTPATIENT
Start: 2024-02-22 | End: 2024-02-25 | Stop reason: HOSPADM

## 2024-02-22 RX ORDER — ALBUTEROL SULFATE 90 UG/1
2 AEROSOL, METERED RESPIRATORY (INHALATION) EVERY 6 HOURS PRN
COMMUNITY
End: 2024-02-22 | Stop reason: ALTCHOICE

## 2024-02-22 RX ORDER — POTASSIUM CHLORIDE 20 MEQ/1
20 TABLET, EXTENDED RELEASE ORAL DAILY
Status: DISCONTINUED | OUTPATIENT
Start: 2024-02-23 | End: 2024-02-25 | Stop reason: HOSPADM

## 2024-02-22 RX ADMIN — TICAGRELOR 90 MG: 90 TABLET ORAL at 22:15

## 2024-02-22 RX ADMIN — VERAPAMIL HYDROCHLORIDE 120 MG: 120 TABLET, FILM COATED, EXTENDED RELEASE ORAL at 22:20

## 2024-02-22 RX ADMIN — OXYCODONE HYDROCHLORIDE AND ACETAMINOPHEN 1000 MG: 500 TABLET ORAL at 22:15

## 2024-02-22 RX ADMIN — ATORVASTATIN CALCIUM 80 MG: 80 TABLET, FILM COATED ORAL at 22:15

## 2024-02-22 RX ADMIN — Medication 400 UNITS: at 22:20

## 2024-02-22 RX ADMIN — TAMSULOSIN HYDROCHLORIDE 0.4 MG: 0.4 CAPSULE ORAL at 22:16

## 2024-02-22 RX ADMIN — CARVEDILOL 25 MG: 25 TABLET, FILM COATED ORAL at 17:41

## 2024-02-22 RX ADMIN — SODIUM CHLORIDE, PRESERVATIVE FREE 10 ML: 5 INJECTION INTRAVENOUS at 22:15

## 2024-02-22 RX ADMIN — ENOXAPARIN SODIUM 30 MG: 100 INJECTION SUBCUTANEOUS at 22:15

## 2024-02-22 RX ADMIN — HYDROCHLOROTHIAZIDE 25 MG: 25 TABLET ORAL at 22:16

## 2024-02-22 RX ADMIN — LISINOPRIL 20 MG: 20 TABLET ORAL at 22:15

## 2024-02-22 RX ADMIN — Medication 200 MG: at 22:15

## 2024-02-22 ASSESSMENT — LIFESTYLE VARIABLES
HOW MANY STANDARD DRINKS CONTAINING ALCOHOL DO YOU HAVE ON A TYPICAL DAY: PATIENT DOES NOT DRINK
HOW OFTEN DO YOU HAVE A DRINK CONTAINING ALCOHOL: NEVER

## 2024-02-22 ASSESSMENT — HEART SCORE: ECG: 0

## 2024-02-22 ASSESSMENT — PAIN - FUNCTIONAL ASSESSMENT: PAIN_FUNCTIONAL_ASSESSMENT: NONE - DENIES PAIN

## 2024-02-22 NOTE — ED PROVIDER NOTES
EMERGENCY DEPARTMENT ENCOUNTER    Pt Name: Armando Belcher  MRN: [id number]  Birthdate 1945  Date of evaluation: 2/22/24  CHIEF COMPLAINT       Chief Complaint   Patient presents with    Shortness of Breath    Dizziness     HISTORY OF PRESENT ILLNESS   78-year-old male presenting to the ER complaining of continued shortness of breath.  Patient states he has also been having trouble putting out his words for a while.  Patient states he had a stent placed on Tuesday at [facility].  It was a cardiac stent.  Patient states the shortness of breath is worsening since then.  Patient is also complaining of lightheadedness.  Patient does not utilize oxygen at home but does use a CPAP at night.    The history is provided by the patient.   Shortness of Breath  Severity:  Moderate  Onset quality:  Gradual  Chronicity:  Chronic  Associated symptoms: no abdominal pain, no chest pain, no cough, no ear pain, no fever, no rash and no vomiting            REVIEW OF SYSTEMS     Review of Systems   Constitutional:  Negative for activity change, fatigue and fever.   HENT:  Negative for congestion, ear pain and trouble swallowing.    Eyes:  Negative for photophobia and visual disturbance.   Respiratory:  Positive for shortness of breath. Negative for cough.    Cardiovascular:  Negative for chest pain and palpitations.   Gastrointestinal:  Negative for abdominal pain, diarrhea, nausea and vomiting.   Genitourinary:  Negative for dysuria, flank pain and urgency.   Musculoskeletal:  Negative for arthralgias and myalgias.   Skin:  Negative for color change and rash.   Neurological:  Positive for speech difficulty and light-headedness. Negative for dizziness and facial asymmetry.   Psychiatric/Behavioral:  Negative for agitation and behavioral problems.      PASTMEDICAL HISTORY     Past Medical History:   Diagnosis Date    Acute gouty arthropathy     Backache, unspecified     Cellulitis and abscess of upper arm and forearm

## 2024-02-22 NOTE — ED NOTES
Report given to SHYAM Scherer from [facility].   Report method by phone   The following was reviewed with receiving RN:   Current vital signs:  BP (!) 194/94   Pulse 64   Temp 97.7 °F (36.5 °C)   Resp 13   Ht 1.78 m (5' 10.08\")   Wt 109.8 kg (242 lb)   SpO2 99%   BMI 34.65 kg/m²                MEWS Score: 1     Any medication or safety alerts were reviewed. Any pending diagnostics and notifications were also reviewed, as well as any safety concerns or issues, abnormal labs, abnormal imaging, and abnormal assessment findings. Questions were answered.

## 2024-02-22 NOTE — H&P
02/22/24 11:40 AM   Result Value Ref Range    Phosphorus 3.0 2.5 - 4.5 mg/dL   Magnesium    Collection Time: 02/22/24 11:40 AM   Result Value Ref Range    Magnesium 2.0 1.6 - 2.6 mg/dL   Basic Metabolic Panel    Collection Time: 02/22/24 11:40 AM   Result Value Ref Range    Sodium 136 135 - 144 mmol/L    Potassium 3.5 (L) 3.7 - 5.3 mmol/L    Chloride 96 (L) 98 - 107 mmol/L    CO2 25 20 - 31 mmol/L    Anion Gap 15 9 - 17 mmol/L    Glucose 102 (H) 70 - 99 mg/dL    BUN 15 8 - 23 mg/dL    Creatinine 0.7 0.7 - 1.2 mg/dL    Est, Glom Filt Rate >60 >60 mL/min/1.73m2    Calcium 9.5 8.6 - 10.4 mg/dL   CBC with Auto Differential    Collection Time: 02/22/24 11:40 AM   Result Value Ref Range    WBC 7.9 3.5 - 11.0 k/uL    RBC 4.89 4.5 - 5.9 m/uL    Hemoglobin 13.2 (L) 13.5 - 17.5 g/dL    Hematocrit 40.1 (L) 41 - 53 %    MCV 82.0 80 - 100 fL    MCH 27.0 26 - 34 pg    MCHC 33.0 31 - 37 g/dL    RDW 15.3 (H) 11.5 - 14.9 %    Platelets 225 150 - 450 k/uL    MPV 7.4 6.0 - 12.0 fL    Neutrophils % 65 36 - 66 %    Lymphocytes % 21 (L) 24 - 44 %    Monocytes % 9 (H) 1 - 7 %    Eosinophils % 4 0 - 4 %    Basophils % 1 0 - 2 %    Neutrophils Absolute 5.10 1.3 - 9.1 k/uL    Lymphocytes Absolute 1.60 1.0 - 4.8 k/uL    Monocytes Absolute 0.70 0.1 - 1.3 k/uL    Eosinophils Absolute 0.30 0.0 - 0.4 k/uL    Basophils Absolute 0.00 0.0 - 0.2 k/uL   Brain Natriuretic Peptide    Collection Time: 02/22/24 11:40 AM   Result Value Ref Range    Pro- (H) <300 pg/mL   EKG 12 Lead    Collection Time: 02/22/24 11:45 AM   Result Value Ref Range    Ventricular Rate 72 BPM    Atrial Rate 72 BPM    P-R Interval 192 ms    QRS Duration 118 ms    Q-T Interval 464 ms    QTc Calculation (Bazett) 508 ms    P Axis 29 degrees    R Axis -14 degrees    T Axis 80 degrees   Blood Gas, Venous    Collection Time: 02/22/24 12:00 PM   Result Value Ref Range    pH, Jose Luis 7.431 (H) 7.320 - 7.420    pCO2, Jose Luis 39.1 39.0 - 55.0 mm Hg    pO2, Jose Luis 66.7 (H) 30.0 - 50.0 mm Hg

## 2024-02-23 ENCOUNTER — APPOINTMENT (OUTPATIENT)
Dept: CT IMAGING | Age: 79
DRG: 322 | End: 2024-02-23
Payer: MEDICARE

## 2024-02-23 LAB
ANION GAP SERPL CALCULATED.3IONS-SCNC: 11 MMOL/L (ref 9–17)
BASOPHILS # BLD: 0 K/UL (ref 0–0.2)
BASOPHILS NFR BLD: 1 % (ref 0–2)
BUN SERPL-MCNC: 12 MG/DL (ref 8–23)
CALCIUM SERPL-MCNC: 9 MG/DL (ref 8.6–10.4)
CHLORIDE SERPL-SCNC: 97 MMOL/L (ref 98–107)
CO2 SERPL-SCNC: 27 MMOL/L (ref 20–31)
CREAT SERPL-MCNC: 0.8 MG/DL (ref 0.7–1.2)
EKG ATRIAL RATE: 78 BPM
EKG P AXIS: 73 DEGREES
EKG P-R INTERVAL: 178 MS
EKG Q-T INTERVAL: 450 MS
EKG QRS DURATION: 118 MS
EKG QTC CALCULATION (BAZETT): 513 MS
EKG R AXIS: 54 DEGREES
EKG T AXIS: -38 DEGREES
EKG VENTRICULAR RATE: 78 BPM
EOSINOPHIL # BLD: 0.3 K/UL (ref 0–0.4)
EOSINOPHILS RELATIVE PERCENT: 4 % (ref 0–4)
ERYTHROCYTE [DISTWIDTH] IN BLOOD BY AUTOMATED COUNT: 15 % (ref 11.5–14.9)
GFR SERPL CREATININE-BSD FRML MDRD: >60 ML/MIN/1.73M2
GLUCOSE SERPL-MCNC: 105 MG/DL (ref 70–99)
HCT VFR BLD AUTO: 38.8 % (ref 41–53)
HGB BLD-MCNC: 12.8 G/DL (ref 13.5–17.5)
LYMPHOCYTES NFR BLD: 1.7 K/UL (ref 1–4.8)
LYMPHOCYTES RELATIVE PERCENT: 25 % (ref 24–44)
MCH RBC QN AUTO: 26.7 PG (ref 26–34)
MCHC RBC AUTO-ENTMCNC: 32.9 G/DL (ref 31–37)
MCV RBC AUTO: 81.3 FL (ref 80–100)
MONOCYTES NFR BLD: 0.6 K/UL (ref 0.1–1.3)
MONOCYTES NFR BLD: 9 % (ref 1–7)
NEUTROPHILS NFR BLD: 61 % (ref 36–66)
NEUTS SEG NFR BLD: 4.2 K/UL (ref 1.3–9.1)
PLATELET # BLD AUTO: 192 K/UL (ref 150–450)
PMV BLD AUTO: 6.8 FL (ref 6–12)
POTASSIUM SERPL-SCNC: 3.6 MMOL/L (ref 3.7–5.3)
RBC # BLD AUTO: 4.77 M/UL (ref 4.5–5.9)
SODIUM SERPL-SCNC: 135 MMOL/L (ref 135–144)
TROPONIN I SERPL HS-MCNC: 25 NG/L (ref 0–22)
TROPONIN I SERPL HS-MCNC: 26 NG/L (ref 0–22)
WBC OTHER # BLD: 6.8 K/UL (ref 3.5–11)

## 2024-02-23 PROCEDURE — 2580000003 HC RX 258: Performed by: INTERNAL MEDICINE

## 2024-02-23 PROCEDURE — 93010 ELECTROCARDIOGRAM REPORT: CPT | Performed by: INTERNAL MEDICINE

## 2024-02-23 PROCEDURE — 6360000004 HC RX CONTRAST MEDICATION: Performed by: INTERNAL MEDICINE

## 2024-02-23 PROCEDURE — 84484 ASSAY OF TROPONIN QUANT: CPT

## 2024-02-23 PROCEDURE — 93005 ELECTROCARDIOGRAM TRACING: CPT | Performed by: INTERNAL MEDICINE

## 2024-02-23 PROCEDURE — 1200000000 HC SEMI PRIVATE

## 2024-02-23 PROCEDURE — 71260 CT THORAX DX C+: CPT

## 2024-02-23 PROCEDURE — 99223 1ST HOSP IP/OBS HIGH 75: CPT | Performed by: INTERNAL MEDICINE

## 2024-02-23 PROCEDURE — 6370000000 HC RX 637 (ALT 250 FOR IP): Performed by: INTERNAL MEDICINE

## 2024-02-23 PROCEDURE — 36415 COLL VENOUS BLD VENIPUNCTURE: CPT

## 2024-02-23 PROCEDURE — 80048 BASIC METABOLIC PNL TOTAL CA: CPT

## 2024-02-23 PROCEDURE — 99233 SBSQ HOSP IP/OBS HIGH 50: CPT | Performed by: INTERNAL MEDICINE

## 2024-02-23 PROCEDURE — 85025 COMPLETE CBC W/AUTO DIFF WBC: CPT

## 2024-02-23 PROCEDURE — 6360000002 HC RX W HCPCS: Performed by: INTERNAL MEDICINE

## 2024-02-23 RX ORDER — ISOSORBIDE MONONITRATE 30 MG/1
30 TABLET, EXTENDED RELEASE ORAL DAILY
Status: DISCONTINUED | OUTPATIENT
Start: 2024-02-23 | End: 2024-02-25 | Stop reason: HOSPADM

## 2024-02-23 RX ORDER — SODIUM CHLORIDE 9 MG/ML
INJECTION, SOLUTION INTRAVENOUS ONCE
Status: COMPLETED | OUTPATIENT
Start: 2024-02-23 | End: 2024-02-23

## 2024-02-23 RX ORDER — SODIUM CHLORIDE 0.9 % (FLUSH) 0.9 %
10 SYRINGE (ML) INJECTION PRN
Status: COMPLETED | OUTPATIENT
Start: 2024-02-23 | End: 2024-02-23

## 2024-02-23 RX ORDER — 0.9 % SODIUM CHLORIDE 0.9 %
500 INTRAVENOUS SOLUTION INTRAVENOUS ONCE
Status: COMPLETED | OUTPATIENT
Start: 2024-02-23 | End: 2024-02-23

## 2024-02-23 RX ORDER — CLOPIDOGREL BISULFATE 75 MG/1
300 TABLET ORAL ONCE
Status: COMPLETED | OUTPATIENT
Start: 2024-02-23 | End: 2024-02-23

## 2024-02-23 RX ORDER — CLOPIDOGREL BISULFATE 75 MG/1
75 TABLET ORAL DAILY
Status: DISCONTINUED | OUTPATIENT
Start: 2024-02-24 | End: 2024-02-25 | Stop reason: HOSPADM

## 2024-02-23 RX ADMIN — SODIUM CHLORIDE, PRESERVATIVE FREE 10 ML: 5 INJECTION INTRAVENOUS at 19:03

## 2024-02-23 RX ADMIN — TICAGRELOR 90 MG: 90 TABLET ORAL at 07:09

## 2024-02-23 RX ADMIN — IOPAMIDOL 75 ML: 755 INJECTION, SOLUTION INTRAVENOUS at 19:03

## 2024-02-23 RX ADMIN — NIFEDIPINE 60 MG: 60 TABLET, EXTENDED RELEASE ORAL at 09:01

## 2024-02-23 RX ADMIN — SODIUM CHLORIDE: 9 INJECTION, SOLUTION INTRAVENOUS at 19:22

## 2024-02-23 RX ADMIN — TAMSULOSIN HYDROCHLORIDE 0.4 MG: 0.4 CAPSULE ORAL at 19:55

## 2024-02-23 RX ADMIN — Medication 200 MG: at 19:55

## 2024-02-23 RX ADMIN — LISINOPRIL 20 MG: 20 TABLET ORAL at 07:09

## 2024-02-23 RX ADMIN — CARVEDILOL 25 MG: 25 TABLET, FILM COATED ORAL at 17:01

## 2024-02-23 RX ADMIN — ATORVASTATIN CALCIUM 80 MG: 80 TABLET, FILM COATED ORAL at 19:56

## 2024-02-23 RX ADMIN — POTASSIUM CHLORIDE 20 MEQ: 1500 TABLET, EXTENDED RELEASE ORAL at 07:09

## 2024-02-23 RX ADMIN — SODIUM CHLORIDE, PRESERVATIVE FREE 10 ML: 5 INJECTION INTRAVENOUS at 07:11

## 2024-02-23 RX ADMIN — ASPIRIN 81 MG: 81 TABLET, COATED ORAL at 07:09

## 2024-02-23 RX ADMIN — Medication 400 UNITS: at 20:01

## 2024-02-23 RX ADMIN — SODIUM CHLORIDE 500 ML: 9 INJECTION, SOLUTION INTRAVENOUS at 14:46

## 2024-02-23 RX ADMIN — FINASTERIDE 5 MG: 5 TABLET, FILM COATED ORAL at 07:09

## 2024-02-23 RX ADMIN — CARVEDILOL 25 MG: 25 TABLET, FILM COATED ORAL at 07:09

## 2024-02-23 RX ADMIN — ISOSORBIDE MONONITRATE 30 MG: 30 TABLET, EXTENDED RELEASE ORAL at 09:01

## 2024-02-23 RX ADMIN — LISINOPRIL 20 MG: 20 TABLET ORAL at 19:56

## 2024-02-23 RX ADMIN — CLOPIDOGREL BISULFATE 300 MG: 75 TABLET ORAL at 17:00

## 2024-02-23 RX ADMIN — HYDROCHLOROTHIAZIDE 25 MG: 25 TABLET ORAL at 07:09

## 2024-02-23 RX ADMIN — OXYCODONE HYDROCHLORIDE AND ACETAMINOPHEN 1000 MG: 500 TABLET ORAL at 19:55

## 2024-02-23 RX ADMIN — ENOXAPARIN SODIUM 30 MG: 100 INJECTION SUBCUTANEOUS at 20:03

## 2024-02-23 NOTE — PLAN OF CARE
Problem: Discharge Planning  Goal: Discharge to home or other facility with appropriate resources  2/23/2024 1557 by Grace Johnson RN  Outcome: Progressing  Flowsheets (Taken 2/22/2024 2214 by Schober, Robyn L, RN)  Discharge to home or other facility with appropriate resources: Identify barriers to discharge with patient and caregiver     Problem: ABCDS Injury Assessment  Goal: Absence of physical injury  2/23/2024 1557 by Grace Johnson RN  Flowsheets (Taken 2/23/2024 0404 by Schober, Robyn L, RN)  Absence of Physical Injury: Implement safety measures based on patient assessment

## 2024-02-23 NOTE — CONSULTS
[facility]   CONSULT NOTE:      DATE OF CONSULT 2/23/2024    REASON FOR CONSULTATION:  Pulmonary management      PCP Zaid Teixeira MD     CHIEF COMPLAINT: Dyspnea    HISTORY OF PRESENT ILLNESS:     Armando is a 78-year-old male who is a patient of my partner Dr. Bañuelos.  He has underlying obstructive sleep apnea and in the past has been very compliant with his machine (auto CPAP 9 to 16 cm EPR 3).  He was last seen in the office in September 2023.  He is admitted with increasing shortness of breath.  He had a stent placed in 2017 and apparently had another stent placed in the LAD on Tuesday.  He had been started on Brilinta and is not sure if that is caused him to have more shortness of breath.  He denies any fevers, chills, worsening cough or sputum production.  He does feel lightheadedness.    He has had increasing dyspnea over the last year.  Pulmonary function test done in September were normal with an FVC of 94% of predicted, and FEV1 of 104% of predicted and the ratio 51 to FVC was 81.  Diffusion capacity was also normal at 99% of predicted and lung valves were all normal.    He quit smoking over 50 years ago.  He does have occupational exposures to asbestos.  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  He says that he has pleural plaques on his x-rays in the past.    His last data download showed 99% compliance, with an AHI of 3.6    He has not worn his CPAP machine while has been in the hospital because \"it is inconvenient\".      ALLERGIES:  Allergies   Allergen Reactions    Mobic [Meloxicam] Shortness Of Breath    Nsaids Shortness Of Breath    Amlodipine Dizziness or Vertigo       HOME MEDICATIONS:  Medications Prior to Admission: atorvastatin (LIPITOR) 80 MG tablet, Take 1 tablet by mouth at bedtime  ticagrelor (BRILINTA) 90 MG TABS tablet, Take 1 tablet by mouth 2 times daily  potassium chloride (KLOR-CON M) 20 MEQ extended release tablet, 
Last Year: 1     Unstable Housing in the Last Year: No        Family History:   Family History   Problem Relation Age of Onset    Cancer Father     Cancer Brother        REVIEW OF SYSTEMS:    Constitutional: there has been no unanticipated weight loss. There's been No change in energy level, No change in activity level.     Eyes: No visual changes or diplopia. No scleral icterus.  ENT: No Headaches, hearing loss or vertigo. No mouth sores or sore throat.  Cardiovascular: As HPI  Respiratory: As HPI  Gastrointestinal: No abdominal pain, appetite loss, blood in stools. No change in bowel or bladder habits.  Genitourinary: No dysuria, trouble voiding, or hematuria.  Musculoskeletal:  No gait disturbance, No weakness or joint complaints.  Integumentary: No rash or pruritis.  Neurological: No headache, diplopia, change in muscle strength, numbness or tingling. No change in gait, balance, coordination, mood, affect, memory, mentation, behavior.  Psychiatric: No anxiety, or depression.  Endocrine: No temperature intolerance. No excessive thirst, fluid intake, or urination. No tremor.  Hematologic/Lymphatic: No abnormal bruising or bleeding, blood clots or swollen lymph nodes.  Allergic/Immunologic: No nasal congestion or hives.    PHYSICAL EXAM:    Physical Examination:    BP (!) 152/90   Pulse 66   Temp 98.3 °F (36.8 °C) (Oral)   Resp 16   Ht 1.78 m (5' 10.08\")   Wt 109.8 kg (242 lb)   SpO2 97%   BMI 34.65 kg/m²    Constitutional and General Appearance: alert, cooperative, no distress and appears stated age  HEENT: PERRL, no cervical lymphadenopathy. No masses palpable. Normal oral mucosa  Respiratory:  Normal excursion and expansion without use of accessory muscles  Resp Auscultation: Good respiratory effort. No for increased work of breathing. On auscultation: clear  Cardiovascular:  Heart tones are crisp and normal. regular S1 and S2. Murmurs: none  Jugular venous pulsation Normal  Abdomen:  No masses or

## 2024-02-23 NOTE — PLAN OF CARE
Problem: Discharge Planning  Goal: Discharge to home or other facility with appropriate resources  2/23/2024 0632 by Schober, Robyn L, RN  Outcome: Progressing  Flowsheets (Taken 2/22/2024 2214)  Discharge to home or other facility with appropriate resources: Identify barriers to discharge with patient and caregiver     Problem: ABCDS Injury Assessment  Goal: Absence of physical injury  2/23/2024 0632 by Schober, Robyn L, RN  Outcome: Progressing  Flowsheets (Taken 2/23/2024 0404)  Absence of Physical Injury: Implement safety measures based on patient assessment

## 2024-02-24 LAB
GLUCOSE BLD-MCNC: 109 MG/DL (ref 75–110)
GLUCOSE BLD-MCNC: 122 MG/DL (ref 75–110)
GLUCOSE BLD-MCNC: 81 MG/DL (ref 75–110)

## 2024-02-24 PROCEDURE — 6370000000 HC RX 637 (ALT 250 FOR IP): Performed by: INTERNAL MEDICINE

## 2024-02-24 PROCEDURE — 99232 SBSQ HOSP IP/OBS MODERATE 35: CPT | Performed by: INTERNAL MEDICINE

## 2024-02-24 PROCEDURE — 2580000003 HC RX 258: Performed by: INTERNAL MEDICINE

## 2024-02-24 PROCEDURE — 82947 ASSAY GLUCOSE BLOOD QUANT: CPT

## 2024-02-24 PROCEDURE — 1200000000 HC SEMI PRIVATE

## 2024-02-24 RX ADMIN — LISINOPRIL 20 MG: 20 TABLET ORAL at 08:19

## 2024-02-24 RX ADMIN — OXYCODONE HYDROCHLORIDE AND ACETAMINOPHEN 1000 MG: 500 TABLET ORAL at 21:48

## 2024-02-24 RX ADMIN — CARVEDILOL 25 MG: 25 TABLET, FILM COATED ORAL at 17:40

## 2024-02-24 RX ADMIN — ATORVASTATIN CALCIUM 80 MG: 80 TABLET, FILM COATED ORAL at 21:49

## 2024-02-24 RX ADMIN — Medication 200 MG: at 21:49

## 2024-02-24 RX ADMIN — SODIUM CHLORIDE, PRESERVATIVE FREE 10 ML: 5 INJECTION INTRAVENOUS at 08:22

## 2024-02-24 RX ADMIN — ASPIRIN 81 MG: 81 TABLET, COATED ORAL at 08:19

## 2024-02-24 RX ADMIN — ISOSORBIDE MONONITRATE 30 MG: 30 TABLET, EXTENDED RELEASE ORAL at 08:19

## 2024-02-24 RX ADMIN — LISINOPRIL 20 MG: 20 TABLET ORAL at 21:48

## 2024-02-24 RX ADMIN — POTASSIUM CHLORIDE 20 MEQ: 1500 TABLET, EXTENDED RELEASE ORAL at 08:19

## 2024-02-24 RX ADMIN — FINASTERIDE 5 MG: 5 TABLET, FILM COATED ORAL at 08:19

## 2024-02-24 RX ADMIN — SODIUM CHLORIDE, PRESERVATIVE FREE 10 ML: 5 INJECTION INTRAVENOUS at 21:47

## 2024-02-24 RX ADMIN — TAMSULOSIN HYDROCHLORIDE 0.4 MG: 0.4 CAPSULE ORAL at 21:49

## 2024-02-24 RX ADMIN — CARVEDILOL 25 MG: 25 TABLET, FILM COATED ORAL at 08:20

## 2024-02-24 RX ADMIN — CLOPIDOGREL BISULFATE 75 MG: 75 TABLET ORAL at 08:19

## 2024-02-24 RX ADMIN — Medication 400 UNITS: at 21:50

## 2024-02-24 NOTE — CARE COORDINATION
ONGOING DISCHARGE PLAN:    Patient is alert and oriented x4.    Spoke with patient regarding discharge plan and patient confirms that plan is still to dishcarge to home with no needs    Pulmonary following     Cardiology following     cardiac cath and stent placement was done Tuesday    Will continue to follow for additional discharge needs.    If patient is discharged prior to next notation, then this note serves as note for discharge by case management.    Electronically signed by Nicole Lacy RN on 2/24/2024 at 3:33 PM    
Assistance needed at discharge: N/A            Potential DME:  n/a  Patient expects to discharge to: House  Plan for transportation at discharge: Self    Financial    Payor: [employer] MEDICARE / Plan: [employer] MEDICARE-ADVANTAGE PPO / Product Type: Medicare /     Does insurance require precert for SNF: Yes    Potential assistance Purchasing Medications: No  Meds-to-Beds request:        [id number] - [place], [place] - [address] - P [phone] - F [phone]  [address] [place] [address]  Phone: [phone] Fax: [phone]    [facility] - [place], [place] - [address] - P [phone] - F [phone]  [address]  [address]  [place] [address]  Phone: [phone] Fax: [phone]      Notes:    Factors facilitating achievement of predicted outcomes: Cooperative    Barriers to discharge: n/a    Additional Case Management Notes: From home alone, independent and drives. Pt's wife has dementia and is at [facility]. DME: cpap. Declined VNS. Denies needs    The Plan for Transition of Care is related to the following treatment goals of Shortness of breath [R06.02]  SOB (shortness of breath) [R06.02]  Exertional dyspnea [R06.09]    IF APPLICABLE: The Patient and/or patient representative Armando and his family were provided with a choice of provider and agrees with the discharge plan. Freedom of choice list with basic dialogue that supports the patient's individualized plan of care/goals and shares the quality data associated with the providers was provided to: Patient   Patient Representative Name:       The Patient and/or Patient Representative Agree with the Discharge Plan? Yes    Margaret Marie RN  Case Management Department  Ph: [phone] Fax: [phone]

## 2024-02-24 NOTE — PLAN OF CARE
Problem: Discharge Planning  Goal: Discharge to home or other facility with appropriate resources  Outcome: Progressing     Problem: ABCDS Injury Assessment  Goal: Absence of physical injury  Outcome: Progressing  Flowsheets (Taken 2/24/2024 8499)  Absence of Physical Injury: Implement safety measures based on patient assessment     Problem: Chronic Conditions and Co-morbidities  Goal: Patient's chronic conditions and co-morbidity symptoms are monitored and maintained or improved  Outcome: Progressing

## 2024-02-25 VITALS
RESPIRATION RATE: 18 BRPM | WEIGHT: 245.81 LBS | HEART RATE: 69 BPM | DIASTOLIC BLOOD PRESSURE: 91 MMHG | TEMPERATURE: 97.8 F | OXYGEN SATURATION: 96 % | BODY MASS INDEX: 35.19 KG/M2 | SYSTOLIC BLOOD PRESSURE: 172 MMHG | HEIGHT: 70 IN

## 2024-02-25 LAB
GLUCOSE BLD-MCNC: 108 MG/DL (ref 75–110)
GLUCOSE BLD-MCNC: 98 MG/DL (ref 75–110)

## 2024-02-25 PROCEDURE — 82947 ASSAY GLUCOSE BLOOD QUANT: CPT

## 2024-02-25 PROCEDURE — 6370000000 HC RX 637 (ALT 250 FOR IP): Performed by: INTERNAL MEDICINE

## 2024-02-25 PROCEDURE — 99239 HOSP IP/OBS DSCHRG MGMT >30: CPT | Performed by: INTERNAL MEDICINE

## 2024-02-25 PROCEDURE — 2580000003 HC RX 258: Performed by: INTERNAL MEDICINE

## 2024-02-25 PROCEDURE — 6360000002 HC RX W HCPCS: Performed by: INTERNAL MEDICINE

## 2024-02-25 RX ORDER — LISINOPRIL 20 MG/1
20 TABLET ORAL 2 TIMES DAILY
Qty: 30 TABLET | Refills: 3 | Status: SHIPPED | OUTPATIENT
Start: 2024-02-25

## 2024-02-25 RX ORDER — ISOSORBIDE MONONITRATE 30 MG/1
30 TABLET, EXTENDED RELEASE ORAL DAILY
Qty: 30 TABLET | Refills: 3 | Status: SHIPPED | OUTPATIENT
Start: 2024-02-26

## 2024-02-25 RX ADMIN — ASPIRIN 81 MG: 81 TABLET, COATED ORAL at 09:41

## 2024-02-25 RX ADMIN — POTASSIUM CHLORIDE 20 MEQ: 1500 TABLET, EXTENDED RELEASE ORAL at 09:41

## 2024-02-25 RX ADMIN — LISINOPRIL 20 MG: 20 TABLET ORAL at 09:41

## 2024-02-25 RX ADMIN — ISOSORBIDE MONONITRATE 30 MG: 30 TABLET, EXTENDED RELEASE ORAL at 09:41

## 2024-02-25 RX ADMIN — CLOPIDOGREL BISULFATE 75 MG: 75 TABLET ORAL at 09:41

## 2024-02-25 RX ADMIN — FINASTERIDE 5 MG: 5 TABLET, FILM COATED ORAL at 09:41

## 2024-02-25 RX ADMIN — SODIUM CHLORIDE, PRESERVATIVE FREE 10 ML: 5 INJECTION INTRAVENOUS at 09:43

## 2024-02-25 RX ADMIN — CARVEDILOL 25 MG: 25 TABLET, FILM COATED ORAL at 08:03

## 2024-02-25 RX ADMIN — ENOXAPARIN SODIUM 30 MG: 100 INJECTION SUBCUTANEOUS at 09:43

## 2024-02-25 NOTE — PLAN OF CARE
Problem: Discharge Planning  Goal: Discharge to home or other facility with appropriate resources  2/25/2024 0334 by González Rojas, RN  Outcome: Progressing  Flowsheets (Taken 2/24/2024 2150)  Discharge to home or other facility with appropriate resources: Identify barriers to discharge with patient and caregiver     Problem: ABCDS Injury Assessment  Goal: Absence of physical injury  2/25/2024 0334 by González Rojas, RN  Outcome: Progressing  Flowsheets (Taken 2/24/2024 2301)  Absence of Physical Injury: Implement safety measures based on patient assessment     Problem: Chronic Conditions and Co-morbidities  Goal: Patient's chronic conditions and co-morbidity symptoms are monitored and maintained or improved  2/25/2024 0334 by González Rojas, RN  Outcome: Progressing  Flowsheets (Taken 2/24/2024 2150)  Care Plan - Patient's Chronic Conditions and Co-Morbidity Symptoms are Monitored and Maintained or Improved: Monitor and assess patient's chronic conditions and comorbid symptoms for stability, deterioration, or improvement     Problem: Respiratory - Adult  Goal: Achieves optimal ventilation and oxygenation  Outcome: Progressing  Flowsheets (Taken 2/24/2024 2150)  Achieves optimal ventilation and oxygenation:   Assess for changes in respiratory status   Oxygen supplementation based on oxygen saturation or arterial blood gases

## 2024-02-25 NOTE — DISCHARGE SUMMARY
IN-PATIENT SERVICE   [facility]    Discharge Summary     Patient ID: Armando Belcher  :  1945   MRN: [id number]     ACCOUNT:  [id number]   Patient's PCP: Zaid Teixeira MD  Admit Date: 2024   Discharge Date: 2024    Length of Stay: 3  Code Status:  Full Code  Admitting Physician: Sherwin Castorena MD  Discharge Physician: Zaid Teixeira MD     Active Discharge Diagnoses:     Primary Problem  Shortness of breath      Hospital Problems  Active Hospital Problems    Diagnosis Date Noted    Exertional dyspnea [R06.09] 2024    Shortness of breath [R06.02] 2017       Admission Condition:  fair     Discharged Condition: fair    Hospital Stay:     Hospital Course:  Armando Belchre is a 78 y.o. male who was admitted for the management of Shortness of breath , presented to ER with Shortness of Breath and Dizziness  78-year-old pleasant gentleman who has history of asbestos exposure from the [employer] some asbestos plaques in the lungs has ongoing dyspnea with exertion patient had a recent cardiac cath done on Tuesday had RCA stent placed off Brilinta now on aspirin and Plavix patient has ongoing dyspnea after cardiac cath troponins ruled out MI  Outpatient PFT planned CT chest was done no PE no pneumonia noted no distal lung disease  Unable to explain dyspnea possible it could be psychogenic given stress from family his wife's dementia and admitted to the medical health unit recently now in dementia care  Patient is refusing to take an antidepressant frustrated with his exertional dyspnea I offered a psychiatric consult which patient adamantly declined patient has no suicidal or homicidal thoughts  Plan is for outpatient pulmonary follow-up and cardiology follow-up  Plan care discussed with patient he understands and is in agreement  [facility]ix sent to [facility]  Brilinta dced by dr sheridan camacho  Advised to phone to RN  By him    Significant

## 2024-02-25 NOTE — PLAN OF CARE
Problem: Discharge Planning  Goal: Discharge to home or other facility with appropriate resources  2/25/2024 1503 by Niesha Woodruff, RN  Outcome: Completed     Problem: ABCDS Injury Assessment  Goal: Absence of physical injury  2/25/2024 1503 by Niesha Woodruff, RN  Outcome: Completed     Problem: Chronic Conditions and Co-morbidities  Goal: Patient's chronic conditions and co-morbidity symptoms are monitored and maintained or improved  2/25/2024 1503 by Niesha Woodruff, RN  Outcome: Completed     Problem: Respiratory - Adult  Goal: Achieves optimal ventilation and oxygenation  2/25/2024 1503 by Niesha Woodruff, RN  Outcome: Completed

## 2024-02-26 ENCOUNTER — CARE COORDINATION (OUTPATIENT)
Dept: CASE MANAGEMENT | Age: 79
End: 2024-02-26

## 2024-02-26 ENCOUNTER — TELEPHONE (OUTPATIENT)
Dept: INTERNAL MEDICINE CLINIC | Age: 79
End: 2024-02-26

## 2024-02-26 DIAGNOSIS — R06.02 SHORTNESS OF BREATH: ICD-10-CM

## 2024-02-26 DIAGNOSIS — M48.061 SPINAL STENOSIS OF LUMBAR REGION AT MULTIPLE LEVELS: Primary | ICD-10-CM

## 2024-02-26 PROCEDURE — 1111F DSCHRG MED/CURRENT MED MERGE: CPT | Performed by: INTERNAL MEDICINE

## 2024-02-26 RX ORDER — CLOPIDOGREL BISULFATE 75 MG/1
75 TABLET ORAL DAILY
Qty: 90 TABLET | Refills: 3 | Status: SHIPPED | OUTPATIENT
Start: 2024-02-26

## 2024-02-26 NOTE — TELEPHONE ENCOUNTER
Patient was just discharged from the hospital yesterday. He was supposed to be given Plavix instead of the Brillinta he was on that he had a reaction to which was why he was admitted.     He did not go home with either.     Patient is very anxious about this and worried that he has none to take. He had a dose yesterday before he was let go.    [facility]

## 2024-02-26 NOTE — PROGRESS NOTES
02/22/24 1644   Encounter Summary   Encounter Overview/Reason  Volunteer Encounter   Service Provided For: Patient   Referral/Consult From: Santosh   Last Encounter  02/22/24   Complexity of Encounter Low   Spiritual/Emotional needs   Type Spiritual Support   Rituals, Rites and Sacraments   Type Anabaptist Communion   Assessment/Intervention/Outcome   Intervention Prayer (assurance of)/[place]       
   02/23/24 1918   Encounter Summary   Encounter Overview/Reason  Volunteer Encounter   Service Provided For: Patient   Referral/Consult From: Santosh   Last Encounter  02/23/24   Complexity of Encounter Low   Spiritual/Emotional needs   Type Spiritual Support   Rituals, Rites and Sacraments   Type Sabianist Communion   Assessment/Intervention/Outcome   Intervention Prayer (assurance of)/[place]       
   02/25/24 1300   Encounter Summary   Encounter Overview/Reason  Volunteer Encounter   Service Provided For: Patient   Referral/Consult From: Santosh   Last Encounter  02/25/24   Complexity of Encounter Low   Begin Time 1000   End Time  1015   Total Time Calculated 15 min   Spiritual/Emotional needs   Type Spiritual Support   Rituals, Rites and Sacraments   Type Scientology Communion       
0.9 ns infusion at 75 ml/hr per Dr Myrick. EKG and troponin ordered  
Dr Myrick notified of EKG results   
Dr Myrick notified of positive orthostatic blood pressure   
Dr Reyes d/c nifedipine due to increase dizziness   
Dr Reyes notified of EKG results   
New consult sent to Dr Hauser  
Patient wakes and  complains of shortness of breath with rest. Patient continues to be on room air spo2 WNL. Patient wears a cpap at home and refuses to wear hospital issued cpap  
Patient was given discharge instructions and questions answered. IV was removed without complications. Personal belongings were gathered. Patient was taken down by wheelchair.   
Pharmacy Medication History Note      List of current medications patient is taking is complete.     Source of information: Patient, Dispense Report, OARRS    Changes made to medication list:  Medications flagged for removal (include reason, ex. noncompliance):  None    Medications removed (include reason, ex. therapy complete or physician discontinued):  None    Medications added/doses adjusted:  None    Other notes (ex. Recent course of antibiotics, Coumadin dosing):  OARRS Reviewed & Negative    Denies use of other OTC or herbal medications.      Allergies clarified    Medication list provided to the patient: no  Medication education provided to the patient: none      Electronically signed by Ashley Banuelos on 2/22/2024 at 3:12 PM                          
Physical Therapy        Physical Therapy Cancel Note      DATE: 2024    NAME: Armando Belcher  MRN: [id number]   : 1945      Patient not seen this date for Physical Therapy due to:    Patient is independent for self care and functional mobility, reported ambulating throughout room with no difficulties. He denied concerns upon discharge. Discontinue physical  therapy as no skilled services are identified. Please re-order if change in status occurs.    - [phone]         Electronically signed by Josy Mccartney PT on 2024 at 11:25 AM     
RT consult for Use of Hospital CPAP machine. Patient declines the use of Hospital CPAP Unit. He does not want to have his brought in at this time.   
[facility]   OCCUPATIONAL THERAPY MISSED TREATMENT NOTE   INPATIENT   Date: 24  Patient Name: Armando Belcher       Room:   MRN: [id number]   Account #: [id number]    : 1945  (78 y.o.)  Gender: male                 REASON FOR MISSED TREATMENT:  Patient is independent for self care and functional mobility, reported ambulating throughout room with no difficulties. He denied concerns upon discharge. Discontinue occupational therapy as no skilled services are identified. Please re-order if change in status occurs.    -    0938        Electronically signed by NIKI Eugene on 24 at 10:15 AM EST    
Spoke with Dr Hauser about consult. He was given patient information and stated that he would take a look at information.   
Edwin Reyes DO, 75 mg at 02/24/24 0819    isosorbide mononitrate (IMDUR) extended release tablet 30 mg, 30 mg, Oral, Daily, Edwin Reyes DO, 30 mg at 02/24/24 0819    sodium chloride flush 0.9 % injection 5-40 mL, 5-40 mL, IntraVENous, 2 times per day, Sherwin Castorena MD, 10 mL at 02/24/24 0822    sodium chloride flush 0.9 % injection 5-40 mL, 5-40 mL, IntraVENous, PRN, Sherwin Castorena MD    0.9 % sodium chloride infusion, , IntraVENous, PRN, Sherwin Castorena MD    potassium chloride (KLOR-CON M) extended release tablet 40 mEq, 40 mEq, Oral, PRN **OR** potassium bicarb-citric acid (EFFER-K) effervescent tablet 40 mEq, 40 mEq, Oral, PRN **OR** potassium chloride 10 mEq/100 mL IVPB (Peripheral Line), 10 mEq, IntraVENous, PRN, Sherwin Castorena MD    magnesium sulfate 2000 mg in water 50 mL IVPB, 2,000 mg, IntraVENous, PRN, Sherwin Castorena MD    enoxaparin Sodium (LOVENOX) injection 30 mg, 30 mg, SubCUTAneous, BID, Sherwin Castroena MD, 30 mg at 02/23/24 2003    ondansetron (ZOFRAN-ODT) disintegrating tablet 4 mg, 4 mg, Oral, Q8H PRN **OR** ondansetron (ZOFRAN) injection 4 mg, 4 mg, IntraVENous, Q6H PRN, Sherwin Castorena MD    polyethylene glycol (GLYCOLAX) packet 17 g, 17 g, Oral, Daily PRN, Sherwin Castorena MD    acetaminophen (TYLENOL) tablet 650 mg, 650 mg, Oral, Q6H PRN **OR** acetaminophen (TYLENOL) suppository 650 mg, 650 mg, Rectal, Q6H PRN, Sherwin Castorena MD    aspirin EC tablet 81 mg, 81 mg, Oral, Daily, Sherwin Castorena MD, 81 mg at 02/24/24 0819    atorvastatin (LIPITOR) tablet 80 mg, 80 mg, Oral, Nightly, Sherwin Castorena MD, 80 mg at 02/23/24 1956    carvedilol (COREG) tablet 25 mg, 25 mg, Oral, BID WC, Sherwin Castorena MD, 25 mg at 02/24/24 0820    finasteride (PROSCAR) tablet 5 mg, 5 mg, Oral, Daily, Sherwin Castorena MD, 5 mg at 02/24/24 0819    magnesium oxide (MAG-OX) tablet 200 mg, 200 mg, Oral, Nightly, Sherwin Castorena MD, 200 mg at 02/23/24 1955    potassium chloride (KLOR-CON M) extended release tablet 20 
Value Date    INR 1.0 02/22/2024    PROTIME 13.3 02/22/2024       Radiology:   No new chest imaging    ASSESSMENT:       Acute on chronic and dyspnea-appears to have gotten immediately worse in the days following recent stent placement.  He had been started on Brilinta which has now been changed over to Plavix  Subpleural fibrosis with asbestos related plaques-normal expiratory flows on recent PFT 9/2023 with isolated residual volume defect which is probably explained by the fibrosis  Obstructive sleep apnea on CPAP  History of asbestos exposure  Coronary disease status post recent stent placement 2/202024  Gout  Hypertension  Arthritis  History of psoriasis  Former smoker-quit 1970, approximately 40-pack-year  Obesity-BMI 34  CODE STATUS-full  PLAN:   He tells me he still feels short of breath, states usually it is when he sleeping he wakes up startled and gasping, I told him when he takes naps he needs to use his CPAP device  He states he is also short of breath with activity, feels he is not any different since prior to his stent, he is very frustrated, I did discuss with him that he just had the stent placed on Tuesday and will take some time, also would benefit from cardiac rehab  I did review his CPAP device, his AHI was over 6, he does use it faithfully, I did adjust the settings to AutoPap 10-18, I stated we can follow-up with us in the office  Dr. Frederick did talk to him in detail about his asbestos-related plaques yesterday and stated he would need outpatient PFT  No objection to discharge from our standpoint anytime  Follow-up in the office in 4 to 6 weeks, [phone]      Electronically signed by FRANCISCO Rankin - CNP on 02/25/24     This progress note was completed using a voice transcription system. Every effort was made to ensure accuracy. However, inadvertent computerized transcription errors may be present.    REYNA SINGH AGACNP-BC, NP-C  [facility] & 
Troponin, High Sensitivity 25 (H) 0 - 22 ng/L   Troponin    Collection Time: 02/23/24  4:40 PM   Result Value Ref Range    Troponin, High Sensitivity 26 (H) 0 - 22 ng/L   POC Glucose Fingerstick    Collection Time: 02/24/24 11:59 AM   Result Value Ref Range    POC Glucose 122 (H) 75 - 110 mg/dL       Imaging/Diagnostics:      Assessment :      Primary Problem  Shortness of breath    Active Hospital Problems    Diagnosis Date Noted    Exertional dyspnea [R06.09] 02/22/2024    Shortness of breath [R06.02] 12/01/2017       Plan:     Patient status Admit as inpatient in the  Med/Surge    Patient admitted with exertional shortness of breath, symptoms getting worse even after putting stent on Tuesday  Patient has pulmonary asbestosis, was following with pulmonologist, he told me last pulmonary function test was done last year and it was normal.  Although in system I could see last PFT done in October 2017 which was normal  DVT prophylaxis Lovenox  Hypertension, uncontrolled resuming home antihypertensive, hydralazine as needed  TARA on CPAP requesting RT consult  I suspect his symptoms are due to use of  Brilinta is associated with symptoms of dyspnea, will leave up to cardiologist if Brilinta can be changed to Plavix, if symptoms do not improve patient need to follow-up with pulmonologist and need to have repeat PFT, CT chest as outpatient      February 23  Admitted with shortness of breath,  On Brilinta, recently started, cardiology stopped the Brilinta started on Plavix,  Patient has asbestos exposure in the past,  Served in the [employer], possible occupational exposure, feeling short of breath,  This morning got 5 antihypertensive medications, check orthostatics,  Not feeling well, feeling very dizzy and weak when he stands,  Will get pulmonary on board,  DVT prophylaxis with Lovenox twice daily,  Will stop hydrochlorothiazide at this time,  On lisinopril, Coreg, Imdur, nifedipine,    Feb 24  Patient has been having dyspnea 
Tuesday  Patient has pulmonary asbestosis, was following with pulmonologist, he told me last pulmonary function test was done last year and it was normal.  Although in system I could see last PFT done in October 2017 which was normal  DVT prophylaxis Lovenox  Hypertension, uncontrolled resuming home antihypertensive, hydralazine as needed  TARA on CPAP requesting RT consult  I suspect his symptoms are due to use of  Brilinta is associated with symptoms of dyspnea, will leave up to cardiologist if Brilinta can be changed to Plavix, if symptoms do not improve patient need to follow-up with pulmonologist and need to have repeat PFT, CT chest as outpatient      February 23  Admitted with shortness of breath,  On Brilinta, recently started, cardiology stopped the Brilinta started on Plavix,  Patient has asbestos exposure in the past,  Served in the [employer], possible occupational exposure, feeling short of breath,  This morning got 5 antihypertensive medications, check orthostatics,  Not feeling well, feeling very dizzy and weak when he stands,  Will get pulmonary on board,  DVT prophylaxis with Lovenox twice daily,  Will stop hydrochlorothiazide at this time,  On lisinopril, Coreg, Imdur, nifedipine,    Consultations:   IP CONSULT TO CARDIOLOGY  IP CONSULT TO INTERNAL MEDICINE  IP CONSULT TO RESPIRATORY CARE  IP CONSULT TO PULMONOLOGY    Patient is admitted as inpatient status because of co-morbidities listed above, severity of signs and symptoms as outlined, requirement for current medical therapies and most importantly because of direct risk to patient if care not provided in a hospital setting.    Ines Myrick MD  2/23/2024  10:50 AM    Copy sent to Zaid Scott MD    Please note that this chart was generated using voice recognition Dragon dictation software.  Although every effort was made to ensure the accuracy of this automated transcription, some errors in transcription may have occurred.

## 2024-02-26 NOTE — CARE COORDINATION
Care Transitions Initial Follow Up Call    Call within 2 business days of discharge: Yes    Patient Current Location:  Home: [address] [place] [address] [place] [address]    LPN Care Coordinator contacted the patient by telephone to perform post hospital discharge assessment. Verified name and  with patient as identifiers. Provided introduction to self, and explanation of the LPN Care Coordinator role.     Patient: Armando Belcher Patient : 1945   MRN: [id number]  Reason for Admission: SOB  Discharge Date: 24 RARS: Readmission Risk Score: 9.4      Last Discharge Facility       Date Complaint Diagnosis Description Type Department Provider    24 Shortness of Breath; Dizziness Shortness of breath ED to Hosp-Admission (Discharged) (ADMITTED) [facility] Sherwin Castorena MD; Sarmad Rutledge...            Was this an external facility discharge? No Discharge Facility: [facility]    Challenges to be reviewed by the provider   Additional needs identified to be addressed with provider: Yes  medications-Per notes from hospitalization Brillinta was D/C'd and he was to take Plavix 75mg QD. This is not on his med list and he was not given an RX for this upon discharge. Can new RX be sent to [facility] for him please?               Method of communication with provider: chart routing.    Writer spoke with Armando for his initial care transitions call. He states he is doing okay. Continues with SOB and some lightheadedness but not as bad as it was previously. Denies chest pain or palpitations.   Medications reviewed. He was not given a prescription for Plavix upon D/C and it is not on his medication list, however it is in the notes that he was to D/C Brillinta and take Plavix 75 mg QD. Message routed to PCP for clarification/refill.   He has already scheduled his PCP and cardiology follow ups. He denies having any further questions or concerns at this time.     LPN Care Coordinator reviewed discharge instructions and red flags

## 2024-03-01 ENCOUNTER — CARE COORDINATION (OUTPATIENT)
Dept: CASE MANAGEMENT | Age: 79
End: 2024-03-01

## 2024-03-01 NOTE — CARE COORDINATION
Care Transitions Outreach Attempt    Call within 2 business days of discharge: Yes   Attempted to reach patient for transitions of care follow up. Unable to reach patient. 1st attempt.     Patient: Armando Belcher Patient : 1945 MRN: [id number]    Last Discharge Facility       Date Complaint Diagnosis Description Type Department Provider    24 Shortness of Breath; Dizziness Shortness of breath ED to Hosp-Ad[facility] (Discharged[facility] Sher[facility]; Sarmad Rutledge...              Was this an external facility discharge? No Discharge Facility Name: [facility]    Noted following upcoming appointments from discharge chart review:   [facility] follow up appointment(s):   Future Appointments   Date Time Provider Department Center   3/4/2024 12:00 PM Shabnam Cantrell MD [facility]   3/8/2024  1:30 PM Augie Talamantes APRN - NP [facility]   2024  8:15 AM Zaid Teixeira MD [facility]     Non-[facility]  follow up appointment(s): n/a

## 2024-03-04 ENCOUNTER — OFFICE VISIT (OUTPATIENT)
Dept: INTERNAL MEDICINE CLINIC | Age: 79
End: 2024-03-04

## 2024-03-04 VITALS
HEIGHT: 71 IN | BODY MASS INDEX: 34.3 KG/M2 | DIASTOLIC BLOOD PRESSURE: 72 MMHG | OXYGEN SATURATION: 98 % | WEIGHT: 245 LBS | SYSTOLIC BLOOD PRESSURE: 120 MMHG | HEART RATE: 64 BPM

## 2024-03-04 DIAGNOSIS — Z09 HOSPITAL DISCHARGE FOLLOW-UP: Primary | ICD-10-CM

## 2024-03-04 NOTE — PROGRESS NOTES
Visit Information    Have you changed or started any medications since your last visit including any over-the-counter medicines, vitamins, or herbal medicines? yes    Are you having any side effects from any of your medications? -  no  Have you stopped taking any of your medications? Is so, why? -  no    Have you seen any other physician or provider since your last visit? Yes - Records Obtained  Have you had any other diagnostic tests since your last visit? Yes - Records Obtained  Have you been seen in the emergency room and/or had an admission to a hospital since we last saw you? Yes - Records Obtained  Have you had your routine dental cleaning in the past 6 months? no    Have you activated your [employer] account? If not, what are your barriers? Yes     Patient Care Team:  Zaid Teixeira MD as PCP - General (Internal Medicine)  [facility]  Schomaker, Bertha, RN as Care Transitions Nurse    Medical History Review  Past Medical, Family, and Social History reviewed and does not contribute to the patient presenting condition    Health Maintenance   Topic Date Due    Respiratory Syncytial Virus (RSV) Pregnant or age 60 yrs+ (1 - 1-dose 60+ series) Never done    Annual Wellness Visit (Medicare Advantage)  01/01/2024    Depression Monitoring  01/10/2025    Lipids  01/11/2025    DTaP/Tdap/Td vaccine (2 - Td or Tdap) 11/18/2031    Flu vaccine  Completed    Shingles vaccine  Completed    Pneumococcal 65+ years Vaccine  Completed    COVID-19 Vaccine  Completed    Hepatitis A vaccine  Aged Out    Hepatitis B vaccine  Aged Out    Hib vaccine  Aged Out    Polio vaccine  Aged Out    Meningococcal (ACWY) vaccine  Aged Out    Depression Screen  Discontinued    Hepatitis C screen  Discontinued

## 2024-03-04 NOTE — PROGRESS NOTES
Post-Discharge Transitional Care  Follow Up      Armando Belcher   YOB: 1945    Date of Office Visit:  3/4/2024  Date of Hospital Admission: 2/22/24  Date of Hospital Discharge: 2/25/24  Risk of hospital readmission (high >=14%. Medium >=10%) :Readmission Risk Score: 9.4      Care management risk score Rising risk (score 2-5) and Complex Care (Scores >=6): No Risk Score On File     Non face to face  following discharge, date last encounter closed (first attempt may have been earlier): 03/01/2024    Call initiated 2 business days of discharge: Yes    ASSESSMENT/PLAN:   Hospital discharge follow-up  -     [place] DISCHARGE MEDS RECONCILED W/ CURRENT OUTPATIENT MED LIST      Medical Decision Making: moderate complexity  No follow-ups on file.    On this date 3/4/2024 I have spent  minutes reviewing previous notes, test results and face to face with the patient discussing the diagnosis and importance of compliance with the treatment plan as well as documenting on the day of the visit.       Subjective:   HPI:  Follow up of Hospital problems/diagnosis(es):     Inpatient course: Discharge summary reviewed- see chart.  Armando Belcher is a 78 y.o. male who was admitted for the management of Shortness of breath , presented to ER with Shortness of Breath and Dizziness  78-year-old pleasant gentleman who has history of asbestos exposure from the [employer] some asbestos plaques in the lungs has ongoing dyspnea with exertion patient had a recent cardiac cath done on Tuesday had RCA stent placed off Brilinta now on aspirin and Plavix patient has ongoing dyspnea after cardiac cath troponins ruled out MI  Outpatient PFT planned CT chest was done no PE no pneumonia noted no distal lung disease  Unable to explain dyspnea possible it could be psychogenic given stress from family his wife's dementia and admitted to the medical health unit recently now in dementia care  Patient is refusing to take an

## 2024-03-05 ENCOUNTER — CARE COORDINATION (OUTPATIENT)
Dept: CASE MANAGEMENT | Age: 79
End: 2024-03-05

## 2024-03-05 NOTE — CARE COORDINATION
Care Transitions Follow Up Call    Patient Current Location:  Home: [address] [place] [address] [place] [address]    Care Transition Nurse contacted the patient by telephone to follow up after admission on .  Verified name and  with patient as identifiers.    Patient: Armando Belcher  Patient : 1945   MRN: [id number]  Reason for Admission: Shortness of breath  Discharge Date: 24 RARS: Readmission Risk Score: 9.4      Needs to be reviewed by the provider   Additional needs identified to be addressed with provider: No  none             Method of communication with provider: none.    Patient returned call for follow up transitional call.  He is frustrated that he is still feeling unwell.  He c/o continued shortness of breath, states he has not had any improvement in his breathing since coming home.  He did have his hospital follow up yesterday at PCP office, notes reviewed.  Patient had same c/o in office, was advised to follow up with cardiology and pulmonary at the end of the month.  Patient had cardiac cath with stent placement, was placed on Brilinta which has since been switched to Plavix but this did not make any difference in his breathing.  O2 saturations remain good in the 90's however patient feels fatigued and c/o decreased stamina.  He is questioning today if maybe it is from his high dose statin he is taking.  He was encouraged to discuss this with cardiology on Friday.    Patient is quite frustrated with ongoing breathing issues, states he has been feeling poorly for some time.  He was offered anti depressant at office yesterday but declined.  Advised I would reach out next week to see what cardiology is suggesting.  Expressed if any new issues or needs arise to feel free to call.     Addressed changes since last contact:  none  Discussed follow-up appointments. If no appointment was previously scheduled, appointment scheduling offered: Yes.   Is follow up appointment scheduled within 7 days of

## 2024-03-05 NOTE — CARE COORDINATION
Care Transitions Outreach Attempt    Call within 2 business days of discharge: Yes   Attempted to reach patient for transitions of care follow up. Unable to reach patient. 2nd attempt to reach.  Patient did have follow up with PCP yesterday, noted that he still is having some shortness of breath and has follow up with pulmonary at end of month.  Notes reviewed. If no return call by end of day will resolve program.     Patient: Armando Belcher Patient : 1945 MRN: [id number]    Last Discharge Facility       Date Complaint Diagnosis Description Type Department Provider    24 Shortness of Breath; Dizziness Shortness of breath ED to Hosp-Admission (Discharged) (ADMITTED) Sherwin López MD; Sarmad Rutledge...              Was this an external facility discharge? No Discharge Facility Name: [facility]    Noted following upcoming appointments from discharge chart review:   [facility] follow up appointment(s):   Future Appointments   Date Time Provider Department Center   3/8/2024  1:30 PM Augie Talamantes, APRN - NP [facility]   2024  8:15 AM Zaid Teixeira MD [facility]     Non-[facility]  follow up appointment(s): n/a       Attending to bill

## 2024-03-11 RX ORDER — LISINOPRIL 20 MG/1
20 TABLET ORAL 2 TIMES DAILY
Qty: 30 TABLET | Refills: 3 | Status: SHIPPED | OUTPATIENT
Start: 2024-03-11

## 2024-03-11 RX ORDER — ISOSORBIDE MONONITRATE 30 MG/1
30 TABLET, EXTENDED RELEASE ORAL DAILY
Qty: 30 TABLET | Refills: 3 | Status: SHIPPED | OUTPATIENT
Start: 2024-03-11

## 2024-03-11 NOTE — TELEPHONE ENCOUNTER
LV 3/4/2024    Patient is in need of 90 day supplies for insurance purposes and the cost.    Patient is out of lisinopril.    Please advise    [facility] [place] [facility]

## 2024-03-12 ENCOUNTER — CARE COORDINATION (OUTPATIENT)
Dept: CASE MANAGEMENT | Age: 79
End: 2024-03-12

## 2024-03-12 NOTE — CARE COORDINATION
Care Transitions Outreach Attempt # 1     Call within 2 business days of discharge: Yes   Attempted to reach patient for transitions of care follow up. Unable to reach patient. Left HIPAA compliant  requesting a return call.     Patient: Armando Belcher Patient : 1945 MRN: [id number]    Last Discharge Facility       Date Complaint Diagnosis Description Type Department Provider    24 Shortness of Breath; Dizziness Shortness of breath ED to Hosp-Admission (Discharged) (ADMITTED) [facility] Sherwin Herrera MD; Sarmad Rutledge...              Was this an external facility discharge? No Discharge Facility Name: [facility]    Future Appointments   Date Time Provider Department Center   2024  8:15 AM Zaid Teixeira MD [facility]   2024  8:30 AM Tom Henderson MD [facility]

## 2024-03-14 ENCOUNTER — CARE COORDINATION (OUTPATIENT)
Dept: CASE MANAGEMENT | Age: 79
End: 2024-03-14

## 2024-03-14 NOTE — CARE COORDINATION
Care Transitions Outreach Attempt #2      Attempted to reach patient for transitions of care follow up. Unable to reach patient. HIPAA compliant message left on  requesting a return call. Will end care transitions if no return call received.     Patient: Armando Belcher Patient : 1945 MRN: [id number]    Last Discharge Facility       Date Complaint Diagnosis Description Type Department Provider    24 Shortness of Breath; Dizziness Shortness of breath ED to Hosp-Admission (Discharged) (ADMITTED) Sherwin López MD; Sarmad Rutledge...              Was this an external facility discharge? No Discharge Facility: [facility]    Noted following upcoming appointments from discharge chart review:   [facility] follow up appointment(s):   Future Appointments   Date Time Provider Department Center   2024  8:15 AM [facility]   2024  8:30 AM Tom Henderson MD [facility] Natarajan LPN  Care Transition Nurse

## 2024-03-18 ENCOUNTER — HOSPITAL ENCOUNTER (OUTPATIENT)
Age: 79
Setting detail: SPECIMEN
Discharge: HOME | End: 2024-03-18

## 2024-03-18 DIAGNOSIS — I25.5 ISCHEMIC CARDIOMYOPATHY: ICD-10-CM

## 2024-03-18 LAB
ANION GAP SERPL CALCULATED.3IONS-SCNC: 11 MMOL/L (ref 9–16)
BUN SERPL-MCNC: 16 MG/DL (ref 8–23)
CALCIUM SERPL-MCNC: 9.3 MG/DL (ref 8.6–10.4)
CHLORIDE SERPL-SCNC: 101 MMOL/L (ref 98–107)
CO2 SERPL-SCNC: 27 MMOL/L (ref 20–31)
CREAT SERPL-MCNC: 1 MG/DL (ref 0.7–1.2)
GFR SERPL CREATININE-BSD FRML MDRD: >60 ML/MIN/1.73M2
GLUCOSE SERPL-MCNC: 89 MG/DL (ref 74–99)
POTASSIUM SERPL-SCNC: 4.7 MMOL/L (ref 3.7–5.3)
SODIUM SERPL-SCNC: 139 MMOL/L (ref 136–145)

## 2024-04-09 RX ORDER — ALBUTEROL SULFATE 2.5 MG/3ML
2.5 SOLUTION RESPIRATORY (INHALATION) ONCE
Status: DISCONTINUED | OUTPATIENT
Start: 2024-04-12 | End: 2024-04-09

## 2024-04-11 RX ORDER — LISINOPRIL 20 MG/1
20 TABLET ORAL 2 TIMES DAILY
Qty: 30 TABLET | Refills: 3 | Status: SHIPPED | OUTPATIENT
Start: 2024-04-11

## 2024-04-12 ENCOUNTER — HOSPITAL ENCOUNTER (OUTPATIENT)
Dept: PULMONOLOGY | Age: 79
Discharge: HOME | End: 2024-04-12
Payer: MEDICARE

## 2024-04-12 DIAGNOSIS — R06.02 SHORTNESS OF BREATH: ICD-10-CM

## 2024-04-12 LAB
EXPIRATORY TIME-POST: NORMAL
EXPIRATORY TIME: NORMAL
FEF 25-75 %CHNG: NORMAL
FEF 25-75 POST %PRED: NORMAL
FEF 25-75% %PRED-PRE: NORMAL
FEF 25-75% PRED: NORMAL
FEF 25-75-POST: NORMAL
FEF 25-75-PRE: NORMAL
FEV1 %PRED-POST: NORMAL
FEV1 %PRED-PRE: NORMAL
FEV1 PRED: NORMAL
FEV1-POST: NORMAL
FEV1-PRE: NORMAL
FEV1/FVC %PRED-POST: NORMAL
FEV1/FVC %PRED-PRE: NORMAL
FEV1/FVC PRED: NORMAL
FEV1/FVC-POST: NORMAL
FEV1/FVC-PRE: NORMAL
FVC %PRED-POST: NORMAL
FVC %PRED-PRE: NORMAL
FVC PRED: NORMAL
FVC-POST: NORMAL
FVC-PRE: NORMAL
PEF %PRED-POST: NORMAL
PEF %PRED-PRE: NORMAL
PEF PRED: NORMAL
PEF%CHNG: NORMAL
PEF-POST: NORMAL
PEF-PRE: NORMAL

## 2024-04-12 PROCEDURE — 6360000002 HC RX W HCPCS: Performed by: INTERNAL MEDICINE

## 2024-04-12 PROCEDURE — 94060 EVALUATION OF WHEEZING: CPT

## 2024-04-12 PROCEDURE — 94070 EVALUATION OF WHEEZING: CPT

## 2024-04-12 RX ORDER — ALBUTEROL SULFATE 2.5 MG/3ML
2.5 SOLUTION RESPIRATORY (INHALATION) ONCE
Status: COMPLETED | OUTPATIENT
Start: 2024-04-12 | End: 2024-04-12

## 2024-04-12 RX ADMIN — ALBUTEROL SULFATE 2.5 MG: 2.5 SOLUTION RESPIRATORY (INHALATION) at 16:07

## 2024-04-12 RX ADMIN — METHACHOLINE CHLORIDE 100 MG: 100 POWDER, FOR SOLUTION RESPIRATORY (INHALATION) at 15:49

## 2024-04-12 NOTE — PROGRESS NOTES
BRONCHIAL / METHACHOLINE CHALLENGE CONTRAINDICATIONS REVIEWED WITH PATIENT PRIOR TO TESTING     Pt states they have none of the following ABSOLUTE CONTRAINDICATIONS    [x]  Known hypersensitivity to methacholine or other parasympathomimetic agents   [x]  FEV 1 of less than 50% normal predicted level or less than 1L (can be determined on baseline testing)   [x]  Myocardial infarction or stroke within last 3 months  [x]  Uncontrolled Hypertension (systolic BP>200 mmHg or diastolic BP>100 mmHg)   - 150/94 : current patient BP  [x]  Known or suspected aortic aneurism  [x]  Recent Eye surgery in which raised intracranial pressure would be harmful           Pt states they have none of the following RELATIVE CONTRAINDICATIONS    [x]   Moderate airflow limitation (reduced FEV1/forced vital capacity ratio) and FEV1 of less than 60% of the predicted normal value, or less than 1.5L. (can be determined on baseline testing)   [x]   Inability to perform acceptable or repeatable spirometry at baseline   [x]   Pregnancy or lactation  [x]   Upper or lower respiratory tract infection   [x]   Current use of beta-adrenic blocking agent         VITAS PRIOR TO TEST     SpO2: 99  HR: 75  BP: 150/94  RR: 16  Work of Breathing: non labored  Breathsounds: clear

## 2024-05-13 ENCOUNTER — HOSPITAL ENCOUNTER (OUTPATIENT)
Age: 79
Setting detail: SPECIMEN
Discharge: HOME | End: 2024-05-13

## 2024-05-13 ENCOUNTER — OFFICE VISIT (OUTPATIENT)
Dept: INTERNAL MEDICINE CLINIC | Age: 79
End: 2024-05-13
Payer: MEDICARE

## 2024-05-13 VITALS
WEIGHT: 250 LBS | SYSTOLIC BLOOD PRESSURE: 138 MMHG | HEIGHT: 71 IN | OXYGEN SATURATION: 97 % | DIASTOLIC BLOOD PRESSURE: 74 MMHG | BODY MASS INDEX: 35 KG/M2 | HEART RATE: 67 BPM

## 2024-05-13 DIAGNOSIS — E66.01 SEVERE OBESITY (BMI 35.0-39.9) WITH COMORBIDITY (HCC): ICD-10-CM

## 2024-05-13 DIAGNOSIS — I50.22 CHRONIC SYSTOLIC CONGESTIVE HEART FAILURE (HCC): Primary | ICD-10-CM

## 2024-05-13 DIAGNOSIS — I25.119 CORONARY ARTERY DISEASE INVOLVING NATIVE CORONARY ARTERY OF NATIVE HEART WITH ANGINA PECTORIS (HCC): ICD-10-CM

## 2024-05-13 DIAGNOSIS — I50.22 CHRONIC SYSTOLIC CONGESTIVE HEART FAILURE (HCC): ICD-10-CM

## 2024-05-13 DIAGNOSIS — Z00.00 MEDICARE ANNUAL WELLNESS VISIT, SUBSEQUENT: ICD-10-CM

## 2024-05-13 LAB
ANION GAP SERPL CALCULATED.3IONS-SCNC: 9 MMOL/L (ref 9–16)
BUN SERPL-MCNC: 18 MG/DL (ref 8–23)
CALCIUM SERPL-MCNC: 9.2 MG/DL (ref 8.6–10.4)
CHLORIDE SERPL-SCNC: 101 MMOL/L (ref 98–107)
CO2 SERPL-SCNC: 26 MMOL/L (ref 20–31)
CREAT SERPL-MCNC: 0.9 MG/DL (ref 0.7–1.2)
GFR, ESTIMATED: 82 ML/MIN/1.73M2
GLUCOSE SERPL-MCNC: 100 MG/DL (ref 74–99)
POTASSIUM SERPL-SCNC: 4.4 MMOL/L (ref 3.7–5.3)
SODIUM SERPL-SCNC: 136 MMOL/L (ref 136–145)

## 2024-05-13 PROCEDURE — G0439 PPPS, SUBSEQ VISIT: HCPCS | Performed by: INTERNAL MEDICINE

## 2024-05-13 PROCEDURE — 3075F SYST BP GE 130 - 139MM HG: CPT | Performed by: INTERNAL MEDICINE

## 2024-05-13 PROCEDURE — 3078F DIAST BP <80 MM HG: CPT | Performed by: INTERNAL MEDICINE

## 2024-05-13 PROCEDURE — 1123F ACP DISCUSS/DSCN MKR DOCD: CPT | Performed by: INTERNAL MEDICINE

## 2024-05-13 RX ORDER — LISINOPRIL 20 MG/1
20 TABLET ORAL 2 TIMES DAILY
Qty: 180 TABLET | Refills: 3 | Status: SHIPPED | OUTPATIENT
Start: 2024-05-13 | End: 2024-08-11

## 2024-05-13 RX ORDER — ISOSORBIDE MONONITRATE 30 MG/1
30 TABLET, EXTENDED RELEASE ORAL DAILY
Qty: 90 TABLET | Refills: 3 | Status: SHIPPED | OUTPATIENT
Start: 2024-05-13

## 2024-05-13 RX ORDER — FAMOTIDINE 20 MG/1
20 TABLET, FILM COATED ORAL 2 TIMES DAILY
COMMUNITY
Start: 2024-03-16

## 2024-05-13 SDOH — ECONOMIC STABILITY: FOOD INSECURITY: WITHIN THE PAST 12 MONTHS, YOU WORRIED THAT YOUR FOOD WOULD RUN OUT BEFORE YOU GOT MONEY TO BUY MORE.: NEVER TRUE

## 2024-05-13 SDOH — ECONOMIC STABILITY: INCOME INSECURITY: HOW HARD IS IT FOR YOU TO PAY FOR THE VERY BASICS LIKE FOOD, HOUSING, MEDICAL CARE, AND HEATING?: NOT HARD AT ALL

## 2024-05-13 SDOH — ECONOMIC STABILITY: FOOD INSECURITY: WITHIN THE PAST 12 MONTHS, THE FOOD YOU BOUGHT JUST DIDN'T LAST AND YOU DIDN'T HAVE MONEY TO GET MORE.: NEVER TRUE

## 2024-05-13 ASSESSMENT — PATIENT HEALTH QUESTIONNAIRE - PHQ9
10. IF YOU CHECKED OFF ANY PROBLEMS, HOW DIFFICULT HAVE THESE PROBLEMS MADE IT FOR YOU TO DO YOUR WORK, TAKE CARE OF THINGS AT HOME, OR GET ALONG WITH OTHER PEOPLE: NOT DIFFICULT AT ALL
2. FEELING DOWN, DEPRESSED OR HOPELESS: NEARLY EVERY DAY
8. MOVING OR SPEAKING SO SLOWLY THAT OTHER PEOPLE COULD HAVE NOTICED. OR THE OPPOSITE, BEING SO FIGETY OR RESTLESS THAT YOU HAVE BEEN MOVING AROUND A LOT MORE THAN USUAL: NOT AT ALL
SUM OF ALL RESPONSES TO PHQ9 QUESTIONS 1 & 2: 3
SUM OF ALL RESPONSES TO PHQ QUESTIONS 1-9: 8
6. FEELING BAD ABOUT YOURSELF - OR THAT YOU ARE A FAILURE OR HAVE LET YOURSELF OR YOUR FAMILY DOWN: NOT AT ALL
SUM OF ALL RESPONSES TO PHQ QUESTIONS 1-9: 8
SUM OF ALL RESPONSES TO PHQ QUESTIONS 1-9: 8
3. TROUBLE FALLING OR STAYING ASLEEP: SEVERAL DAYS
4. FEELING TIRED OR HAVING LITTLE ENERGY: NEARLY EVERY DAY
1. LITTLE INTEREST OR PLEASURE IN DOING THINGS: NOT AT ALL
5. POOR APPETITE OR OVEREATING: NOT AT ALL
SUM OF ALL RESPONSES TO PHQ QUESTIONS 1-9: 8
7. TROUBLE CONCENTRATING ON THINGS, SUCH AS READING THE NEWSPAPER OR WATCHING TELEVISION: SEVERAL DAYS
9. THOUGHTS THAT YOU WOULD BE BETTER OFF DEAD, OR OF HURTING YOURSELF: NOT AT ALL

## 2024-05-13 NOTE — PROGRESS NOTES
Visit Information    Have you changed or started any medications since your last visit including any over-the-counter medicines, vitamins, or herbal medicines? no   Are you having any side effects from any of your medications? -  no  Have you stopped taking any of your medications? Is so, why? -  no    Have you seen any other physician or provider since your last visit? Yes - Records Obtained  Have you had any other diagnostic tests since your last visit? Yes - Records Obtained  Have you been seen in the emergency room and/or had an admission to a hospital since we last saw you? No  Have you had your routine dental cleaning in the past 6 months? no    Have you activated your [employer] account? If not, what are your barriers? Yes     Patient Care Team:  Zaid Teixeira MD as PCP - General (Internal Medicine)  Zaid Teixeira MD as PCP - Empaneled Provider    Medical History Review  Past Medical, Family, and Social History reviewed and does not contribute to the patient presenting condition    Health Maintenance   Topic Date Due    Respiratory Syncytial Virus (RSV) Pregnant or age 60 yrs+ (1 - 1-dose 60+ series) Never done    Annual Wellness Visit (Medicare Advantage)  01/01/2024    Depression Monitoring  01/10/2025    Lipids  01/11/2025    DTaP/Tdap/Td vaccine (2 - Td or Tdap) 11/18/2031    Flu vaccine  Completed    Shingles vaccine  Completed    Pneumococcal 65+ years Vaccine  Completed    COVID-19 Vaccine  Completed    Hepatitis A vaccine  Aged Out    Hepatitis B vaccine  Aged Out    Hib vaccine  Aged Out    Polio vaccine  Aged Out    Meningococcal (ACWY) vaccine  Aged Out    Depression Screen  Discontinued    Hepatitis C screen  Discontinued       
severe   20-27 severe   Interventions:  Patient advised to follow-up in this office for further evaluation and treatment           Activity, Diet, and Weight:  On average, how many days per week do you engage in moderate to strenuous exercise (like a brisk walk)?: 5 days  On average, how many minutes do you engage in exercise at this level?: 30 min    Do you eat balanced/healthy meals regularly?: (!) No    Body mass index is 35.36 kg/m². (!) Abnormal    Do you eat balanced/healthy meals regularly Interventions:  Patient comments: yes  Obesity Interventions:  Patient comments: diet and life style changes    Obesity Counseling: Patient was asked about his current diet and exercise habits, and personalized advice was provided regarding recommended lifestyle changes. Patient's comorbid health conditions associated with elevated BMI were discussed, as well as the likely benefits of weight loss. Based upon patient's motivation to change his behavior, the following plan was agreed upon to work toward a weight loss goal of 10 pounds: Patient is not ready to make any meaningful lifestyle changes at this time. Educational materials for weight loss were provided.  Patient will follow-up in 3 month(s) with PCP. Time spent counseling patient: 30 minutes            ADL's:   Patient reports needing help with:  Select all that apply: (!) Housekeeping  Interventions:  Patient comments: lives alone wife  in long term care      LDCT Screening: Discussed with patient the benefits and harms of screening, follow-up diagnostic testing, over-diagnosis, false positive rate, and total radiation exposure. Counseled on the importance of adherence to annual lung cancer LDCT screening, impact of comorbidities, ability and willingness to undergo diagnosis and treatment. Counseled on the importance of maintaining cigarette smoking abstinence and cessation. The patient has a history of >20 pack years and is either still smoking or quit within the

## 2024-06-06 ENCOUNTER — OFFICE VISIT (OUTPATIENT)
Age: 79
End: 2024-06-06

## 2024-06-06 VITALS
WEIGHT: 243.6 LBS | OXYGEN SATURATION: 99 % | DIASTOLIC BLOOD PRESSURE: 82 MMHG | HEART RATE: 64 BPM | SYSTOLIC BLOOD PRESSURE: 136 MMHG | BODY MASS INDEX: 34.46 KG/M2

## 2024-06-06 DIAGNOSIS — I25.119 CORONARY ARTERY DISEASE INVOLVING NATIVE CORONARY ARTERY OF NATIVE HEART WITH ANGINA PECTORIS (HCC): Primary | ICD-10-CM

## 2024-06-06 RX ORDER — ATORVASTATIN CALCIUM 20 MG/1
40 TABLET, FILM COATED ORAL DAILY
Qty: 90 TABLET | Refills: 3 | Status: SHIPPED | OUTPATIENT
Start: 2024-06-06

## 2024-06-06 NOTE — PROGRESS NOTES
hours.  BMP: No results for input(s): \"NA\", \"K\", \"CO2\", \"BUN\", \"CREATININE\", \"LABGLOM\", \"GLUCOSE\" in the last 72 hours.  PT/INR: No results for input(s): \"PROTIME\", \"INR\" in the last 72 hours.  FASTING LIPID PANEL:  Lab Results   Component Value Date/Time    HDL 41 01/11/2024 07:18 AM    LDLDIRECT 129 03/01/2012 09:03 AM    TRIG 109 10/27/2022 07:05 AM     LIVER PROFILE:No results for input(s): \"AST\", \"ALT\", \"LABALBU\" in the last 72 hours.    EKG 6/6/2024 : Normal sinus rhythm, nonspecific QRS widening, inferior infarct    LHC 2/16/2024    Three vessel coronary artery disease.     of RCA- known. There are left to right collaterals.    Patent OM1 stent.    Mid LAD 80% focal stenosis. Successful PTCA/TAM of mid LAD.      Past Medical and Surgical History, Problem List, Allergies, Medications, Labs, Imaging, all reviewed extensively in EMR and with the patient.    Assessment:   Three vessel CAD with history of acute MI in 2017 status post PCI to 1st OM with 3.5 into 15 mm Xience stent.  RCA was noted to be chronically occluded with collaterals. S/p PCI - LAD in 02/2024. OM stent was patent.   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 Patient's medications and side effects were discussed. All questions and concerns were addressed to patient's satisfaction.     The patient is to follow up in 6 months or sooner if necessary.     Thank you for allowing me to participate in the care of this patient, please do not hesitate to call if you have any questions.      Payton Cintron MD, [facility], [facility]

## 2024-06-14 DIAGNOSIS — I10 ESSENTIAL HYPERTENSION: Primary | ICD-10-CM

## 2024-06-14 RX ORDER — ATORVASTATIN CALCIUM 40 MG/1
40 TABLET, FILM COATED ORAL DAILY
Qty: 90 TABLET | Refills: 0 | Status: SHIPPED | OUTPATIENT
Start: 2024-06-14

## 2024-06-14 NOTE — TELEPHONE ENCOUNTER
[facility] called and said the patient's insurance will not cover the current atorvastatin medication 20mg BID and was wondering if we could change it to Atorvastatin 40mg once a day instead?

## 2024-07-18 ENCOUNTER — OFFICE VISIT (OUTPATIENT)
Dept: INTERNAL MEDICINE CLINIC | Age: 79
End: 2024-07-18

## 2024-07-18 VITALS
HEIGHT: 71 IN | BODY MASS INDEX: 34.3 KG/M2 | SYSTOLIC BLOOD PRESSURE: 138 MMHG | WEIGHT: 245 LBS | DIASTOLIC BLOOD PRESSURE: 84 MMHG | OXYGEN SATURATION: 97 % | HEART RATE: 77 BPM

## 2024-07-18 DIAGNOSIS — R53.83 FATIGUE, UNSPECIFIED TYPE: ICD-10-CM

## 2024-07-18 DIAGNOSIS — I50.22 CHRONIC SYSTOLIC CONGESTIVE HEART FAILURE (HCC): ICD-10-CM

## 2024-07-18 DIAGNOSIS — N50.89 TESTICLE LUMP: ICD-10-CM

## 2024-07-18 DIAGNOSIS — N64.4 NIPPLE PAIN: Primary | ICD-10-CM

## 2024-07-18 DIAGNOSIS — Z86.39 H/O VITAMIN D DEFICIENCY: ICD-10-CM

## 2024-07-18 RX ORDER — SPIRONOLACTONE 25 MG/1
12.5 TABLET ORAL DAILY
Qty: 90 TABLET | Refills: 0 | Status: SHIPPED | OUTPATIENT
Start: 2024-07-18

## 2024-07-18 ASSESSMENT — ENCOUNTER SYMPTOMS
ALLERGIC/IMMUNOLOGIC NEGATIVE: 1
ABDOMINAL PAIN: 0
DIARRHEA: 0
WHEEZING: 0
SHORTNESS OF BREATH: 0
NAUSEA: 0
COUGH: 0
VOMITING: 0
BACK PAIN: 0
CONSTIPATION: 0

## 2024-07-18 ASSESSMENT — PATIENT HEALTH QUESTIONNAIRE - PHQ9
SUM OF ALL RESPONSES TO PHQ QUESTIONS 1-9: 0
SUM OF ALL RESPONSES TO PHQ QUESTIONS 1-9: 0
SUM OF ALL RESPONSES TO PHQ9 QUESTIONS 1 & 2: 0
SUM OF ALL RESPONSES TO PHQ QUESTIONS 1-9: 0
2. FEELING DOWN, DEPRESSED OR HOPELESS: NOT AT ALL
SUM OF ALL RESPONSES TO PHQ QUESTIONS 1-9: 0
1. LITTLE INTEREST OR PLEASURE IN DOING THINGS: NOT AT ALL

## 2024-07-18 NOTE — PROGRESS NOTES
Visit Information    Have you changed or started any medications since your last visit including any over-the-counter medicines, vitamins, or herbal medicines? no   Are you having any side effects from any of your medications? -  no  Have you stopped taking any of your medications? Is so, why? -  no    Have you seen any other physician or provider since your last visit? Yes - Records Obtained  Have you had any other diagnostic tests since your last visit? Yes - Records Obtained  Have you been seen in the emergency room and/or had an admission to a hospital since we last saw you? Yes - Records Obtained  Have you had your routine dental cleaning in the past 6 months? yes -     Have you activated your [employer] account? If not, what are your barriers? Yes     Patient Care Team:  Zaid Teixeira MD as PCP - General (Internal Medicine)  Zaid Teixeira MD as PCP - Empaneled Provider    Medical History Review  Past Medical, Family, and Social History reviewed and does contribute to the patient presenting condition    Health Maintenance   Topic Date Due    Respiratory Syncytial Virus (RSV) Pregnant or age 60 yrs+ (1 - 1-dose 60+ series) Never done    Flu vaccine (1) 08/01/2024    Lipids  01/11/2025    Depression Screen  05/13/2025    DTaP/Tdap/Td vaccine (2 - Td or Tdap) 11/18/2031    Annual Wellness Visit (Medicare Advantage)  Completed    Shingles vaccine  Completed    Pneumococcal 65+ years Vaccine  Completed    COVID-19 Vaccine  Completed    Hepatitis A vaccine  Aged Out    Hepatitis B vaccine  Aged Out    Hib vaccine  Aged Out    Polio vaccine  Aged Out    Meningococcal (ACWY) vaccine  Aged Out    Depression Monitoring  Discontinued    Hepatitis C screen  Discontinued

## 2024-07-18 NOTE — PROGRESS NOTES
[facility]  [address] [address]  Dept: [phone]  Dept Fax: [phone]    OFFICE VISIT NOTE  Date of patient's visit: 7/18/2024  Patient's Name:  Armando Belcher YOB: 1945            Patient Care Team:  Zaid Teixeira MD as PCP - General (Internal Medicine)  Zaid Teixeira MD as PCP - Empaneled Provider  _________________________________________    ________________________________________________  Chief Complaint:   Pain (Pain behind nipples; began 3-4 weeks ago./)    _______________________________________________  History of Presenting Illness:  History was obtained from the patient. Armando Belcher is a 79 y.o. male.     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 Schumacher (uro) appt in a month        Lab Results   Component Value Date    HGB 12.8 (L) 02/23/2024    LABA1C 5.4 07/03/2013    CHOL 152 10/27/2022    HDL 41 01/11/2024    LDL 65 01/11/2024    TRIG 109 10/27/2022    VITD25 85.5 03/11/2021    CREATININE 0.9 05/13/2024     _____________________________________________________  Past Medical/Surgical History:        Diagnosis Date    Acute gouty arthropathy     Acute sinusitis 02/10/2016    Backache, unspecified     Cellulitis and abscess of upper arm and forearm     Degeneration of lumbar or lumbosacral intervertebral disc     Essential hypertension, benign     Obstructive sleep apnea on CPAP     Osteoarthrosis, unspecified whether generalized or localized, unspecified site     Other  Addison Martell, PGY2: 46 year old female p/w fatigue, back pain, and abd pain. Recently admitted w/ anemia, wants to check that she's not anemic again. Low grade fever the other day. Denies sick contacts. Exam benign. Plan for labs, UA/UCx, reassess.

## 2024-07-19 ENCOUNTER — HOSPITAL ENCOUNTER (OUTPATIENT)
Age: 79
Setting detail: SPECIMEN
Discharge: HOME | End: 2024-07-19

## 2024-07-19 DIAGNOSIS — R53.83 FATIGUE, UNSPECIFIED TYPE: ICD-10-CM

## 2024-07-19 DIAGNOSIS — Z86.39 H/O VITAMIN D DEFICIENCY: ICD-10-CM

## 2024-07-19 LAB
25(OH)D3 SERPL-MCNC: 51.1 NG/ML (ref 30–100)
FOLATE SERPL-MCNC: >20 NG/ML (ref 4.8–24.2)
SHBG SERPL-SCNC: 34 NMOL/L (ref 19–76)
TESTOST FREE MFR SERPL: 49.2 PG/ML (ref 47–244)
TESTOST SERPL-MCNC: 258 NG/DL (ref 193–740)
TSH SERPL DL<=0.05 MIU/L-ACNC: 1.3 UIU/ML (ref 0.27–4.2)
VIT B12 SERPL-MCNC: 739 PG/ML (ref 232–1245)

## 2024-07-24 ENCOUNTER — HOSPITAL ENCOUNTER (OUTPATIENT)
Dept: ULTRASOUND IMAGING | Age: 79
Discharge: HOME | End: 2024-07-26
Payer: MEDICARE

## 2024-07-24 DIAGNOSIS — N50.89 TESTICLE LUMP: ICD-10-CM

## 2024-07-24 PROCEDURE — 93976 VASCULAR STUDY: CPT

## 2024-07-26 ENCOUNTER — OFFICE VISIT (OUTPATIENT)
Dept: INTERNAL MEDICINE CLINIC | Age: 79
End: 2024-07-26

## 2024-07-26 VITALS
BODY MASS INDEX: 34.44 KG/M2 | DIASTOLIC BLOOD PRESSURE: 82 MMHG | WEIGHT: 246 LBS | HEIGHT: 71 IN | OXYGEN SATURATION: 97 % | SYSTOLIC BLOOD PRESSURE: 138 MMHG | HEART RATE: 66 BPM

## 2024-07-26 DIAGNOSIS — I10 ESSENTIAL HYPERTENSION: Primary | ICD-10-CM

## 2024-07-26 DIAGNOSIS — N64.4 NIPPLE PAIN: ICD-10-CM

## 2024-07-26 ASSESSMENT — ENCOUNTER SYMPTOMS
VOMITING: 0
ABDOMINAL PAIN: 0
CONSTIPATION: 0
COUGH: 0
BACK PAIN: 0
WHEEZING: 0
NAUSEA: 0
ALLERGIC/IMMUNOLOGIC NEGATIVE: 1
DIARRHEA: 0
SHORTNESS OF BREATH: 0

## 2024-07-26 NOTE — PROGRESS NOTES
[facility]  [address] [address]  Dept: [phone]  Dept Fax: [phone]    OFFICE VISIT NOTE  Date of patient's visit: 7/26/2024  Patient's Name:  Armando Belcher YOB: 1945            Patient Care Team:  Zaid Teixeira MD as PCP - General (Internal Medicine)  Zaid Teixeira MD as PCP - Empaneled Provider  _________________________________________    ________________________________________________  Chief Complaint:   Breast Problem (Nipple pain, follow up. )    _______________________________________________  History of Presenting Illness:  History was obtained from the patient. Armando Belcher is a 79 y.o. male.     BP: normal   HR: normal     1 wk Follow-up   Have not taken the aldactone since last visit.   Nipple pain about the same.   Had been measuring blood pressure at home, with a wrist blood pressure machine, it has been above 150/70, patient brought the machine here, blood pressure during the office visit was below 140/90.  Discussed lab results with the patient  Ultrasound scrotum results still pending.      Lab Results   Component Value Date    HGB 12.8 (L) 02/23/2024    LABA1C 5.4 07/03/2013    CHOL 152 10/27/2022    HDL 41 01/11/2024    LDL 65 01/11/2024    TRIG 109 10/27/2022    VITD25 51.1 07/19/2024    CREATININE 0.9 05/13/2024     _____________________________________________________  Past Medical/Surgical History:        Diagnosis Date    Acute gouty arthropathy     Acute sinusitis 02/10/2016    Backache, unspecified     Cellulitis and abscess of upper arm and forearm     Degeneration of lumbar or lumbosacral intervertebral disc     Essential hypertension, benign     Obstructive sleep apnea on CPAP     Osteoarthrosis, unspecified whether generalized or localized, unspecified site     Other and unspecified hyperlipidemia     Other malaise and fatigue     Other psoriasis     Sleep apnea     Thoracic or lumbosacral

## 2024-07-26 NOTE — PROGRESS NOTES
Visit Information    Have you changed or started any medications since your last visit including any over-the-counter medicines, vitamins, or herbal medicines? no   Are you having any side effects from any of your medications? -  no  Have you stopped taking any of your medications? Is so, why? -  no    Have you seen any other physician or provider since your last visit? No  Have you had any other diagnostic tests since your last visit? Yes - Records Obtained  Have you been seen in the emergency room and/or had an admission to a hospital since we last saw you? No  Have you had your routine dental cleaning in the past 6 months? no    Have you activated your [employer] account? If not, what are your barriers? Yes     Patient Care Team:  Zaid Teixeira MD as PCP - General (Internal Medicine)  Zaid Teixeira MD as PCP - Empaneled Provider    Medical History Review  Past Medical, Family, and Social History reviewed and does contribute to the patient presenting condition    Health Maintenance   Topic Date Due    Respiratory Syncytial Virus (RSV) Pregnant or age 60 yrs+ (1 - 1-dose 60+ series) Never done    Flu vaccine (1) 08/01/2024    Lipids  01/11/2025    Depression Screen  07/18/2025    DTaP/Tdap/Td vaccine (2 - Td or Tdap) 11/18/2031    Annual Wellness Visit (Medicare Advantage)  Completed    Shingles vaccine  Completed    Pneumococcal 65+ years Vaccine  Completed    COVID-19 Vaccine  Completed    Hepatitis A vaccine  Aged Out    Hepatitis B vaccine  Aged Out    Hib vaccine  Aged Out    Polio vaccine  Aged Out    Meningococcal (ACWY) vaccine  Aged Out    Depression Monitoring  Discontinued    Hepatitis C screen  Discontinued

## 2024-07-29 DIAGNOSIS — N50.3 EPIDIDYMAL CYST: Primary | ICD-10-CM

## 2024-07-30 ENCOUNTER — TELEPHONE (OUTPATIENT)
Dept: UROLOGY | Age: 79
End: 2024-07-30

## 2024-07-30 NOTE — TELEPHONE ENCOUNTER
wroter spoke with pt. pt states \"I've been with Dr. Schumacher and i will stick with him till he retires.\" referral closed & sent back to pcp

## 2024-08-12 ENCOUNTER — HOSPITAL ENCOUNTER (OUTPATIENT)
Dept: CARDIAC REHAB | Age: 79
Setting detail: THERAPIES SERIES
Discharge: HOME | End: 2024-08-12
Payer: MEDICARE

## 2024-08-12 VITALS — HEIGHT: 70 IN | BODY MASS INDEX: 35.03 KG/M2 | RESPIRATION RATE: 16 BRPM | OXYGEN SATURATION: 96 % | WEIGHT: 244.7 LBS

## 2024-08-12 PROCEDURE — 93797 PHYS/QHP OP CAR RHAB WO ECG: CPT

## 2024-08-12 PROCEDURE — 93798 PHYS/QHP OP CAR RHAB W/ECG: CPT

## 2024-08-12 ASSESSMENT — PATIENT HEALTH QUESTIONNAIRE - PHQ9
3. TROUBLE FALLING OR STAYING ASLEEP: NEARLY EVERY DAY
SUM OF ALL RESPONSES TO PHQ9 QUESTIONS 1 & 2: 6
5. POOR APPETITE OR OVEREATING: NEARLY EVERY DAY
SUM OF ALL RESPONSES TO PHQ QUESTIONS 1-9: 19
1. LITTLE INTEREST OR PLEASURE IN DOING THINGS: NEARLY EVERY DAY
SUM OF ALL RESPONSES TO PHQ QUESTIONS 1-9: 22
9. THOUGHTS THAT YOU WOULD BE BETTER OFF DEAD, OR OF HURTING YOURSELF: NEARLY EVERY DAY
SUM OF ALL RESPONSES TO PHQ QUESTIONS 1-9: 22
10. IF YOU CHECKED OFF ANY PROBLEMS, HOW DIFFICULT HAVE THESE PROBLEMS MADE IT FOR YOU TO DO YOUR WORK, TAKE CARE OF THINGS AT HOME, OR GET ALONG WITH OTHER PEOPLE: SOMEWHAT DIFFICULT
SUM OF ALL RESPONSES TO PHQ QUESTIONS 1-9: 22
7. TROUBLE CONCENTRATING ON THINGS, SUCH AS READING THE NEWSPAPER OR WATCHING TELEVISION: MORE THAN HALF THE DAYS
2. FEELING DOWN, DEPRESSED OR HOPELESS: NEARLY EVERY DAY
4. FEELING TIRED OR HAVING LITTLE ENERGY: NEARLY EVERY DAY
8. MOVING OR SPEAKING SO SLOWLY THAT OTHER PEOPLE COULD HAVE NOTICED. OR THE OPPOSITE, BEING SO FIGETY OR RESTLESS THAT YOU HAVE BEEN MOVING AROUND A LOT MORE THAN USUAL: MORE THAN HALF THE DAYS

## 2024-08-12 ASSESSMENT — EXERCISE STRESS TEST
PEAK_BP: 138/78
PEAK_HR: 83
PEAK_BP: 138/78
PEAK_METS: 3.2
PEAK_RPE: 11

## 2024-08-12 ASSESSMENT — EJECTION FRACTION: EF_VALUE: 45

## 2024-08-12 NOTE — DISCHARGE INSTRUCTIONS
Cardiac Rehabilitation: After Your Visit  Your Care Instructions  Cardiac rehabilitation is a program for people who have had a heart attack or bypass surgery, or who have other heart problems. The program includes exercise, lifestyle changes, education, and emotional support. Cardiac rehab can help you improve the quality of your life through better overall health. It can help you lose weight and feel better about yourself.  On your cardiac rehab team, you may have your doctor, a nurse specialist, a dietitian, and a physical therapist. They will design your cardiac rehab program specifically for you. You will learn how to reduce your risk for heart problems, how to manage stress, and how to eat a heart-healthy diet. By the end of the program, you will be ready to maintain a healthier lifestyle on your own.  Follow-up care is a key part of your treatment and safety. Be sure to make and go to all appointments, and call your doctor if you are having problems. It’s also a good idea to know your test results and keep a list of the medicines you take.  How can you care for yourself at home?  Take your medicines exactly as prescribed. Call your doctor if you think you are having a problem with your medicine. You will get more details on the specific medicines your doctor prescribes.  Monitor your weight by weighing yourself at least once a week on the same scale with the same amount of clothing at the same time of day.  Plan your meals so that you are eating heart-healthy foods.  Eat a variety of foods daily. Fresh fruits and vegetables and whole-grains are good choices.  Limit your fat intake, especially saturated and trans fat.  Limit salt (sodium).  Increase fiber in your diet.  Limit alcohol.  Learn how to take your pulse so that you can track your heart rate during exercise.  Always check with your doctor before you begin a new exercise program.  Warm up before you exercise and cool down afterward. This will help

## 2024-08-12 NOTE — FLOWSHEET NOTE
08/12/24 0823   Treatment Diagnosis   Treatment Diagnosis 1 PCI   PCI Date 02/20/24   Referral Date 07/18/24   Significant Cardiovascular History Previous myocardial infarction;Previous PCI  (HTN,HLD, MI - 2017, STENT - 2017)   Co-morbidities Previous MI  (TARA - CPAP AT NIGHT, MALAISE, FATIGUE.)   Oxygen Saturation / Titration    Stages of Change  Maintenance   Oxygen Intervention   Oxygen Use No   O2 Sat Greater Than 90% Yes  (96%)   Nurse/Patient Discussion  YES   Individual Treatment Plan   ITP Visit Type Initial assessment   1st Date of Exercise  08/12/24   ITP Next Review Date 09/06/24   Visit #/Total Visits 1/36   EF% 45 %  (45-50% ECHO 12/28/2023)   Risk Stratification Moderate   ITP Exercise;Psychosocial;Tobacco;Nutrition;Education   Exercise    DASI Total Score 24.95   Bruce Total Score 15   Stages of Change Action   Assisted Devices None   Exercise Prescription   Mode Treadmill;Bike;Stepper;Ergometer  (FREE WEIGHTS)   Frequency per week 2   Duration Per Session 31 MIN   Intensity METS       3.2   RPE 11   Progression INITIAL   Resistance Training Yes   Exercise Blood Pressures   Resting /76   Peak /78   Is BP WDL Yes   Exercise Activity at Home   Type RIDES A STATIONARY BIKE   Frequency TRIES DAILY   Duration 5 MILES WHICH TAKES ABOUT 23 MINS   Resistance Training No   Exercise Education   Education Self pulse;Exercise safety;Signs/symptoms to report;RPE scale;Equipment orientation;Warm up/cool down;Physically active   Exercise Target Goal   Target Goal(s) Individual exercise RX;BP < 140/90 or < 130/80, if DM or CKD;Aerobic activity 30 + minutes/day  5 days/week   Patient Stated Exercise Goals TO INCREASE STRENGTH AND ENDURANCE   Psychosocial   Stages of Change Other (comment)  (PT IS UNDER A LOT OF STRESS; WIFE IS NOW IN A HOME D/T ALZHEIMER'S; VERY HARD ON PT;  FOR A LONG TIME. HE WILL NOT TAKE MEDS; BUT WISHES HE WAS DEAD; KNOWS IT IS IN GOD'S HANDS THOUGH. PHQ SENT TO

## 2024-08-12 NOTE — CARDIO/PULMONARY
Pt oriented to Cardiac Rehab, goals set and ITP initiated. CAD Risk Factors & Prevention video viewed. Rate Your Plate given.

## 2024-08-12 NOTE — FLOWSHEET NOTE
Please review attached PHQ9 results. Pt states his wife is in a home now d/t Alzheimer's; very hard for him. He states he will not take meds. He started Cardiac Rehab today. He is having a hard time with it;  for a very long time; wishes he was dead; but states he knows it is in God's hands.    08/12/24 0823   Special Test   Stage of Rehab Intake   Test Cardiac Knowledge Test;DASI;PHQ 9;Bruce Fall Risk   Storm Activity Status Index   Can You Take Care of Yourself 2.75   Can You Walk Indoors 1.75   Can You Walk a Block or Two on Level Ground 2.75   Can You Climb a Flight of Stairs or Walk Up a Hill 5.50   Can You Run a Short Distance 0   Can You Do Light Work Around the House 2.70   Can You Do Moderate Work Around the House 3.50   Can You Do Heavy Work Around the House 0   Can You Do Yardwork 0   Can You Have Sexual Relation 0   Can You Participate in Moderate Recreational Activites 6.00   Can You Participate in Strenuous Sports 0   DASI Total Score 24.95   Estimated Peak Oxygen Uptake 5.81 mL/min   Cardiac Knowledge Test   Most People Can Tell Whether or Not They Have High Blood Pressure 1-False   Walking and Gardening are Considered Types of Exercise That Can Lower Heart Disease Risk 0-False   Taking an Aspirin Each Day Decreases the Risk of Getting Heart Disease 1-True   Heart Disease is the Leading Cause of Death in the [place] 1-True   A Healthy Person's Pulse Should Return to Normal Within 15 Minutes After Exercise 1-True   HDL Refers to \"Good\" Cholesterol, and LDL Refers to \"Bad\" Cholesterol 1-True   \"High\" Blood Pressure is Defined as 110/80 (Systolic/Diastolic) or Higher 1-False   Most Women are More Likely to Die From Breast Cancer than Heart Disease 1-False   People Who Have Diabetes are at Higher Risk of Getting Heart Disease 1-True   Many Vegetables are High in Cholesterol 1-False   Cardiac Knowledge Total Score 9   SHRUTHI Index for COPD   BMI (Calculated) 35.2   Bruce Fall Risk   History of

## 2024-08-14 ENCOUNTER — HOSPITAL ENCOUNTER (OUTPATIENT)
Dept: CARDIAC REHAB | Age: 79
Setting detail: THERAPIES SERIES
Discharge: HOME | End: 2024-08-14
Payer: MEDICARE

## 2024-08-14 VITALS — BODY MASS INDEX: 35.07 KG/M2 | WEIGHT: 244.4 LBS

## 2024-08-14 PROCEDURE — 93798 PHYS/QHP OP CAR RHAB W/ECG: CPT

## 2024-08-14 ASSESSMENT — EXERCISE STRESS TEST
PEAK_BP: 150/82
PEAK_RPE: 11
PEAK_HR: 88
PEAK_METS: 3.6

## 2024-08-16 ENCOUNTER — HOSPITAL ENCOUNTER (OUTPATIENT)
Dept: CARDIAC REHAB | Age: 79
Setting detail: THERAPIES SERIES
Discharge: HOME | End: 2024-08-16
Payer: MEDICARE

## 2024-08-16 VITALS — BODY MASS INDEX: 34.92 KG/M2 | WEIGHT: 243.4 LBS

## 2024-08-16 PROCEDURE — 93798 PHYS/QHP OP CAR RHAB W/ECG: CPT

## 2024-08-16 ASSESSMENT — EXERCISE STRESS TEST
PEAK_HR: 82
PEAK_METS: 4.2
PEAK_RPE: 11
PEAK_BP: 150/80

## 2024-08-19 ENCOUNTER — HOSPITAL ENCOUNTER (OUTPATIENT)
Age: 79
Setting detail: SPECIMEN
Discharge: HOME | End: 2024-08-19

## 2024-08-19 LAB — PSA SERPL-MCNC: 0.3 NG/ML (ref 0–4)

## 2024-08-21 ENCOUNTER — HOSPITAL ENCOUNTER (OUTPATIENT)
Dept: CARDIAC REHAB | Age: 79
Setting detail: THERAPIES SERIES
Discharge: HOME | End: 2024-08-21
Payer: MEDICARE

## 2024-08-21 VITALS — WEIGHT: 243.2 LBS | BODY MASS INDEX: 34.9 KG/M2

## 2024-08-21 PROCEDURE — 93798 PHYS/QHP OP CAR RHAB W/ECG: CPT

## 2024-08-21 ASSESSMENT — EXERCISE STRESS TEST
PEAK_HR: 82
PEAK_RPE: 12
PEAK_BP: 146/84
PEAK_METS: 4

## 2024-08-23 ENCOUNTER — HOSPITAL ENCOUNTER (OUTPATIENT)
Dept: CARDIAC REHAB | Age: 79
Setting detail: THERAPIES SERIES
Discharge: HOME | End: 2024-08-23
Payer: MEDICARE

## 2024-08-23 VITALS — BODY MASS INDEX: 35.07 KG/M2 | WEIGHT: 244.4 LBS

## 2024-08-23 PROCEDURE — 93798 PHYS/QHP OP CAR RHAB W/ECG: CPT

## 2024-08-23 ASSESSMENT — EXERCISE STRESS TEST
PEAK_METS: 3.4
PEAK_HR: 79
PEAK_RPE: 13
PEAK_BP: 140/90

## 2024-08-24 RX ORDER — ATORVASTATIN CALCIUM 20 MG/1
40 TABLET, FILM COATED ORAL DAILY
Qty: 90 TABLET | Refills: 3 | Status: SHIPPED | OUTPATIENT
Start: 2024-08-24

## 2024-08-26 RX ORDER — ATORVASTATIN CALCIUM 40 MG/1
40 TABLET, FILM COATED ORAL DAILY
Qty: 90 TABLET | Refills: 3 | Status: SHIPPED | OUTPATIENT
Start: 2024-08-26

## 2024-08-28 ENCOUNTER — HOSPITAL ENCOUNTER (OUTPATIENT)
Dept: CARDIAC REHAB | Age: 79
Setting detail: THERAPIES SERIES
Discharge: HOME | End: 2024-08-28
Payer: MEDICARE

## 2024-08-28 VITALS — BODY MASS INDEX: 35.21 KG/M2 | WEIGHT: 245.4 LBS

## 2024-08-28 PROCEDURE — 93797 PHYS/QHP OP CAR RHAB WO ECG: CPT

## 2024-08-28 PROCEDURE — 93798 PHYS/QHP OP CAR RHAB W/ECG: CPT

## 2024-08-28 ASSESSMENT — EXERCISE STRESS TEST
PEAK_HR: 74
PEAK_METS: 5.7
PEAK_RPE: 12
PEAK_BP: 128/80

## 2024-08-30 ENCOUNTER — HOSPITAL ENCOUNTER (OUTPATIENT)
Dept: CARDIAC REHAB | Age: 79
Setting detail: THERAPIES SERIES
Discharge: HOME | End: 2024-08-30
Payer: MEDICARE

## 2024-08-30 VITALS — BODY MASS INDEX: 35.14 KG/M2 | WEIGHT: 244.9 LBS

## 2024-08-30 PROCEDURE — 93798 PHYS/QHP OP CAR RHAB W/ECG: CPT

## 2024-08-30 RX ORDER — CARVEDILOL 25 MG/1
TABLET ORAL
Qty: 180 TABLET | Refills: 0 | Status: SHIPPED | OUTPATIENT
Start: 2024-08-30

## 2024-08-30 ASSESSMENT — EXERCISE STRESS TEST
PEAK_METS: 6
PEAK_BP: 132/76
PEAK_HR: 91
PEAK_RPE: 13

## 2024-09-04 ENCOUNTER — APPOINTMENT (OUTPATIENT)
Dept: CARDIAC REHAB | Age: 79
End: 2024-09-04
Payer: MEDICARE

## 2024-09-04 ENCOUNTER — OFFICE VISIT (OUTPATIENT)
Dept: INTERNAL MEDICINE CLINIC | Age: 79
End: 2024-09-04

## 2024-09-04 VITALS
HEIGHT: 70 IN | SYSTOLIC BLOOD PRESSURE: 132 MMHG | BODY MASS INDEX: 35.33 KG/M2 | WEIGHT: 246.8 LBS | HEART RATE: 71 BPM | DIASTOLIC BLOOD PRESSURE: 82 MMHG | OXYGEN SATURATION: 97 %

## 2024-09-04 DIAGNOSIS — E55.9 VITAMIN D DEFICIENCY: ICD-10-CM

## 2024-09-04 DIAGNOSIS — I25.119 CORONARY ARTERY DISEASE INVOLVING NATIVE CORONARY ARTERY OF NATIVE HEART WITH ANGINA PECTORIS (HCC): Primary | ICD-10-CM

## 2024-09-04 DIAGNOSIS — I10 ESSENTIAL HYPERTENSION: ICD-10-CM

## 2024-09-04 DIAGNOSIS — J61 PULMONARY ASBESTOSIS (HCC): ICD-10-CM

## 2024-09-04 DIAGNOSIS — I50.22 CHRONIC SYSTOLIC CONGESTIVE HEART FAILURE (HCC): ICD-10-CM

## 2024-09-04 DIAGNOSIS — M54.17 LUMBOSACRAL RADICULOPATHY: ICD-10-CM

## 2024-09-04 RX ORDER — POTASSIUM CHLORIDE 1500 MG/1
TABLET, EXTENDED RELEASE ORAL
COMMUNITY
Start: 2024-07-30

## 2024-09-04 RX ORDER — ISOSORBIDE MONONITRATE 30 MG/1
TABLET, EXTENDED RELEASE ORAL
COMMUNITY
Start: 2024-09-01

## 2024-09-04 RX ORDER — ATORVASTATIN CALCIUM 20 MG/1
40 TABLET, FILM COATED ORAL DAILY
Qty: 90 TABLET | Refills: 3 | Status: SHIPPED | OUTPATIENT
Start: 2024-09-04

## 2024-09-04 ASSESSMENT — PATIENT HEALTH QUESTIONNAIRE - PHQ9
9. THOUGHTS THAT YOU WOULD BE BETTER OFF DEAD, OR OF HURTING YOURSELF: NOT AT ALL
1. LITTLE INTEREST OR PLEASURE IN DOING THINGS: NOT AT ALL
5. POOR APPETITE OR OVEREATING: NOT AT ALL
SUM OF ALL RESPONSES TO PHQ QUESTIONS 1-9: 0
SUM OF ALL RESPONSES TO PHQ QUESTIONS 1-9: 0
10. IF YOU CHECKED OFF ANY PROBLEMS, HOW DIFFICULT HAVE THESE PROBLEMS MADE IT FOR YOU TO DO YOUR WORK, TAKE CARE OF THINGS AT HOME, OR GET ALONG WITH OTHER PEOPLE: NOT DIFFICULT AT ALL
2. FEELING DOWN, DEPRESSED OR HOPELESS: NOT AT ALL
7. TROUBLE CONCENTRATING ON THINGS, SUCH AS READING THE NEWSPAPER OR WATCHING TELEVISION: NOT AT ALL
3. TROUBLE FALLING OR STAYING ASLEEP: NOT AT ALL
SUM OF ALL RESPONSES TO PHQ QUESTIONS 1-9: 0
SUM OF ALL RESPONSES TO PHQ QUESTIONS 1-9: 0
6. FEELING BAD ABOUT YOURSELF - OR THAT YOU ARE A FAILURE OR HAVE LET YOURSELF OR YOUR FAMILY DOWN: NOT AT ALL
8. MOVING OR SPEAKING SO SLOWLY THAT OTHER PEOPLE COULD HAVE NOTICED. OR THE OPPOSITE, BEING SO FIGETY OR RESTLESS THAT YOU HAVE BEEN MOVING AROUND A LOT MORE THAN USUAL: NOT AT ALL
SUM OF ALL RESPONSES TO PHQ9 QUESTIONS 1 & 2: 0
4. FEELING TIRED OR HAVING LITTLE ENERGY: NOT AT ALL

## 2024-09-04 ASSESSMENT — ENCOUNTER SYMPTOMS
EYE PAIN: 0
COLOR CHANGE: 0
COUGH: 0
ABDOMINAL DISTENTION: 0
EYE DISCHARGE: 0
DIARRHEA: 0
TROUBLE SWALLOWING: 0
WHEEZING: 0
BLOOD IN STOOL: 0
SHORTNESS OF BREATH: 0

## 2024-09-04 NOTE — PROGRESS NOTES
Visit Information    Have you changed or started any medications since your last visit including any over-the-counter medicines, vitamins, or herbal medicines? no   Are you having any side effects from any of your medications? -  no  Have you stopped taking any of your medications? Is so, why? -  no    Have you seen any other physician or provider since your last visit? Yes - Records Obtained  Have you had any other diagnostic tests since your last visit? Yes - Records Obtained  Have you been seen in the emergency room and/or had an admission to a hospital since we last saw you? Yes - Records Obtained  Have you had your routine dental cleaning in the past 6 months? yes -     Have you activated your [employer] account? If not, what are your barriers? Yes     Patient Care Team:  Zaid Teixeira MD as PCP - General (Internal Medicine)  Zaid Teixeira MD as PCP - Empaneled Provider    Medical History Review  Past Medical, Family, and Social History reviewed and does contribute to the patient presenting condition    Health Maintenance   Topic Date Due    Respiratory Syncytial Virus (RSV) Pregnant or age 60 yrs+ (1 - 1-dose 60+ series) Never done    Flu vaccine (1) 08/01/2024    COVID-19 Vaccine (8 - 2023-24 season) 09/01/2024    Lipids  01/11/2025    Depression Screen  08/12/2025    DTaP/Tdap/Td vaccine (2 - Td or Tdap) 11/18/2031    Annual Wellness Visit (Medicare Advantage)  Completed    Shingles vaccine  Completed    Pneumococcal 65+ years Vaccine  Completed    Hepatitis A vaccine  Aged Out    Hepatitis B vaccine  Aged Out    Hib vaccine  Aged Out    Polio vaccine  Aged Out    Meningococcal (ACWY) vaccine  Aged Out    Depression Monitoring  Discontinued    Hepatitis C screen  Discontinued

## 2024-09-04 NOTE — PROGRESS NOTES
[facility]  [address] [address]  Dept: [phone]  Dept Fax: [phone]    Armando Belcher is a 79 y.o. male who presents today for his medical conditions/complaintsas noted below.  Armando Belcher is c/o of   Chief Complaint   Patient presents with    Check-Up     3 month health check up with provider.         HPI:     HTN  Onset more than 2 years ago  oniel mild to mod  Controlled with current po meds  Not associated with headaches or blurry vision  No chest pain    HLD  Onset more than 5 years ago  Severity is mild, not getting worse  Not associated with pancreatitis  Tolerating statin well no muscle pain            Hemoglobin A1C (%)   Date Value   07/03/2013 5.4             ( goal A1Cis < 7)   No components found for: \"LABMICR\"  No components found for: \"LDLCHOLESTEROL\", \"LDLCALC\"    (goal LDL is <100)   AST (U/L)   Date Value   11/18/2021 20     ALT (U/L)   Date Value   11/18/2021 22     BUN (mg/dL)   Date Value   05/13/2024 18     BP Readings from Last 3 Encounters:   09/04/24 132/82   07/26/24 138/82   07/18/24 138/84          (goal 120/80)    Past Medical History:   Diagnosis Date    Acute gouty arthropathy     Acute sinusitis 02/10/2016    Backache, unspecified     Cellulitis and abscess of upper arm and forearm     Degeneration of lumbar or lumbosacral intervertebral disc     Essential hypertension, benign     Obstructive sleep apnea on CPAP     Osteoarthrosis, unspecified whether generalized or localized, unspecified site     Other and unspecified hyperlipidemia     Other malaise and fatigue     Other psoriasis     Sleep apnea     Thoracic or lumbosacral neuritis or radiculitis, unspecified     Unspecified vitamin D deficiency       Past Surgical History:   Procedure Laterality Date    CARDIAC CATHETERIZATION  2017    [facility]    CARDIAC PROCEDURE N/A 2/20/2024    ali / Left heart cath / coronary angiography performed by Tom Henderson

## 2024-09-06 ENCOUNTER — HOSPITAL ENCOUNTER (OUTPATIENT)
Dept: CARDIAC REHAB | Age: 79
Setting detail: THERAPIES SERIES
Discharge: HOME | End: 2024-09-06
Payer: MEDICARE

## 2024-09-06 VITALS — BODY MASS INDEX: 35.1 KG/M2 | WEIGHT: 244.6 LBS

## 2024-09-06 PROCEDURE — 93798 PHYS/QHP OP CAR RHAB W/ECG: CPT

## 2024-09-06 ASSESSMENT — EJECTION FRACTION: EF_VALUE: 45

## 2024-09-06 ASSESSMENT — EXERCISE STRESS TEST
PEAK_RPE: 13
PEAK_BP: 136/76
PEAK_BP: 136/76
PEAK_METS: 7.2
PEAK_HR: 88

## 2024-09-06 NOTE — FLOWSHEET NOTE
09/06/24 0748   Treatment Diagnosis   Treatment Diagnosis 1 PCI   PCI Date 02/20/24   Referral Date 07/18/24   Significant Cardiovascular History Previous myocardial infarction;Previous PCI  (HTN, HLD)   Co-morbidities Previous MI  (TARA-CPAP)   Oxygen Saturation / Titration    Stages of Change  Maintenance   Oxygen Intervention   Oxygen Use No   Individual Treatment Plan   ITP Visit Type Re-assessment   1st Date of Exercise  08/12/24   ITP Next Review Date 10/04/24   Visit #/Total Visits 8/36   EF% 45 %  (45-50% VIA ECHO 12/28/23)   Risk Stratification Moderate   ITP Exercise;Psychosocial;Tobacco;Nutrition;Education   Exercise    Stages of Change Action   Assisted Devices None   Exercise Prescription   Mode Treadmill;Bike;Ergometer;Other (comment)  (FREE WEIGHTS)   Frequency per week 2   Duration Per Session 37 MIN   Progression PT IS INCREASING EXERCISE TIME AS PRESCRIBED AND METS AS RPE ALLOWS   Resistance Training Yes   Exercise Blood Pressures   Resting /78   Peak /76   Is BP WDL No   Exercise Activity at Home   Type STATIONARY BIKE   Frequency THREE DAYS A WEEK   Duration 5 MILES WHICH TAKES ABOUT 23 MINS   Resistance Training No   Exercise Education   Education Self pulse;Exercise safety;Signs/symptoms to report;RPE scale;Equipment orientation;Warm up/cool down;Physically active   Exercise Target Goal   Target Goal(s) Individual exercise RX;BP < 140/90 or < 130/80, if DM or CKD;Aerobic activity 30 + minutes/day  5 days/week   Patient Stated Exercise Goals TO INCREASE STRENGTH AND ENDURANCE   Psychosocial   Stages of Change Maintenance   Psychosocial Intervention   Interventions No intervention indicated  (PT HAD APPT WITH PCP 09/04/24)   Currently Taking Psychotropic Meds No   Medication Changes No   Psychosocial Education   Education Benefits of CPR completion;Cardiac meds;Coping techniques;Environmental triggers;Impact self care behaviors on health;Relaxation techniques;Sexual

## 2024-09-06 NOTE — PROGRESS NOTES
Updating Individual treatment plan for cardiac rehab today. When reviewing home medications with pt, discrepancies with Isosorbide-pt indicates he is taking but then states \"I'm not sure.\" Per Dr. ZA Cintron's OV note on 06/09/24 Isosorbide was stopped, pt will check and let us know when he returns next week. Pt indicated he is no longer taking Klor-con and Lasix 20 mg he is only taking once a day. Pt given medication list to take home to review with what he is taking and update us on Weds when he returns for cardiac rehab.

## 2024-09-11 ENCOUNTER — HOSPITAL ENCOUNTER (OUTPATIENT)
Dept: CARDIAC REHAB | Age: 79
Setting detail: THERAPIES SERIES
Discharge: HOME | End: 2024-09-11
Payer: MEDICARE

## 2024-09-11 VITALS — WEIGHT: 246.1 LBS | BODY MASS INDEX: 35.31 KG/M2

## 2024-09-11 PROCEDURE — 93798 PHYS/QHP OP CAR RHAB W/ECG: CPT

## 2024-09-11 ASSESSMENT — EXERCISE STRESS TEST
PEAK_HR: 88
PEAK_BP: 154/76
PEAK_METS: 3.1
PEAK_RPE: 11

## 2024-09-13 ENCOUNTER — HOSPITAL ENCOUNTER (OUTPATIENT)
Dept: CARDIAC REHAB | Age: 79
Setting detail: THERAPIES SERIES
Discharge: HOME | End: 2024-09-13
Payer: MEDICARE

## 2024-09-13 VITALS — BODY MASS INDEX: 35.02 KG/M2 | WEIGHT: 244.1 LBS

## 2024-09-13 PROCEDURE — 93798 PHYS/QHP OP CAR RHAB W/ECG: CPT

## 2024-09-13 ASSESSMENT — EXERCISE STRESS TEST
PEAK_HR: 88
PEAK_METS: 3.1
PEAK_RPE: 11
PEAK_BP: 170/98

## 2024-09-16 ENCOUNTER — TELEPHONE (OUTPATIENT)
Dept: INTERNAL MEDICINE CLINIC | Age: 79
End: 2024-09-16

## 2024-09-18 ENCOUNTER — HOSPITAL ENCOUNTER (OUTPATIENT)
Dept: CARDIAC REHAB | Age: 79
Setting detail: THERAPIES SERIES
Discharge: HOME | End: 2024-09-18
Payer: MEDICARE

## 2024-09-18 VITALS — BODY MASS INDEX: 35.08 KG/M2 | WEIGHT: 244.5 LBS

## 2024-09-18 PROCEDURE — 93798 PHYS/QHP OP CAR RHAB W/ECG: CPT

## 2024-09-18 ASSESSMENT — EXERCISE STRESS TEST
PEAK_METS: 4
PEAK_BP: 146/86
PEAK_RPE: 11
PEAK_HR: 86

## 2024-09-20 ENCOUNTER — HOSPITAL ENCOUNTER (OUTPATIENT)
Dept: CARDIAC REHAB | Age: 79
Setting detail: THERAPIES SERIES
Discharge: HOME | End: 2024-09-20
Payer: MEDICARE

## 2024-09-20 VITALS — WEIGHT: 242.8 LBS | BODY MASS INDEX: 34.84 KG/M2

## 2024-09-20 PROCEDURE — 93798 PHYS/QHP OP CAR RHAB W/ECG: CPT

## 2024-09-20 ASSESSMENT — EXERCISE STRESS TEST
PEAK_METS: 3.4
PEAK_BP: 152/78
PEAK_HR: 82
PEAK_RPE: 12

## 2024-09-25 ENCOUNTER — HOSPITAL ENCOUNTER (OUTPATIENT)
Dept: CARDIAC REHAB | Age: 79
Setting detail: THERAPIES SERIES
Discharge: HOME | End: 2024-09-25
Payer: MEDICARE

## 2024-09-25 VITALS — BODY MASS INDEX: 34.92 KG/M2 | WEIGHT: 243.4 LBS

## 2024-09-25 PROCEDURE — 93798 PHYS/QHP OP CAR RHAB W/ECG: CPT

## 2024-09-25 ASSESSMENT — EXERCISE STRESS TEST
PEAK_HR: 81
PEAK_RPE: 12
PEAK_BP: 140/70
PEAK_METS: 4.5

## 2024-09-27 ENCOUNTER — HOSPITAL ENCOUNTER (OUTPATIENT)
Dept: CARDIAC REHAB | Age: 79
Setting detail: THERAPIES SERIES
Discharge: HOME | End: 2024-09-27
Payer: MEDICARE

## 2024-09-27 VITALS — BODY MASS INDEX: 34.68 KG/M2 | WEIGHT: 241.7 LBS

## 2024-09-27 PROCEDURE — 93798 PHYS/QHP OP CAR RHAB W/ECG: CPT

## 2024-09-27 ASSESSMENT — EXERCISE STRESS TEST
PEAK_METS: 4.1
PEAK_BP: 120/64
PEAK_HR: 95
PEAK_RPE: 12

## 2024-10-02 ENCOUNTER — HOSPITAL ENCOUNTER (OUTPATIENT)
Dept: CARDIAC REHAB | Age: 79
Setting detail: THERAPIES SERIES
Discharge: HOME | End: 2024-10-02
Payer: MEDICARE

## 2024-10-02 VITALS — WEIGHT: 241.1 LBS | BODY MASS INDEX: 34.59 KG/M2

## 2024-10-02 PROCEDURE — 93798 PHYS/QHP OP CAR RHAB W/ECG: CPT

## 2024-10-02 ASSESSMENT — EXERCISE STRESS TEST
PEAK_HR: 82
PEAK_BP: 138/82
PEAK_METS: 4.1
PEAK_RPE: 12

## 2024-10-04 ENCOUNTER — APPOINTMENT (OUTPATIENT)
Dept: CARDIAC REHAB | Age: 79
End: 2024-10-04
Payer: MEDICARE

## 2024-10-04 ENCOUNTER — HOSPITAL ENCOUNTER (OUTPATIENT)
Dept: CARDIAC REHAB | Age: 79
Setting detail: THERAPIES SERIES
Discharge: HOME | End: 2024-10-04
Payer: MEDICARE

## 2024-10-04 VITALS — BODY MASS INDEX: 34.65 KG/M2 | WEIGHT: 241.5 LBS

## 2024-10-04 PROCEDURE — 93798 PHYS/QHP OP CAR RHAB W/ECG: CPT

## 2024-10-04 ASSESSMENT — EXERCISE STRESS TEST
PEAK_BP: 170/84
PEAK_RPE: 13
PEAK_HR: 80
PEAK_METS: 4.3

## 2024-10-04 NOTE — PROGRESS NOTES
10/04/24 0801   Treatment Diagnosis   Treatment Diagnosis 1 PCI   PCI Date 02/20/24   Referral Date 07/18/24   Significant Cardiovascular History Previous myocardial infarction;Previous PCI  (HTN, HLD, MI/STENT 2017)   Co-morbidities Previous MI  (TARA-CPAP)   Individual Treatment Plan   ITP Visit Type Re-assessment   ITP Next Review Date 11/01/24   Visit #/Total Visits 16/36   EF% 45 %  (45-50% ON ECHO 12/28/2023)   Risk Stratification Moderate   Supplemental Oxygen   Oxygen Use No   Oxygen Assessment   Stages of Change  Maintenance   Exercise Assessment   Stages of Change Action   Assisted Devices None   Exercise Intervention/Prescription   Mode Treadmill;Bike;Ergometer;Other (comment)  (FREE WEIGHTS)   Frequency per week 2   Duration Per Session 46 MIN.   Intensity METS       4.3   RPE 13   Progression DURATION INCREASED EVERY 3 DAYS, INTENSITY AS RPE ALLOWS   Resistance Training Yes   Exercise Activity at Home   Type DAVIDN D-AD   Frequency THREE DAYS A WEEK.   Duration 23 MINUTES OR 5 MILES, WHICHEVER COMES FIRST   Resistance Training No   Exercise Education   Education Self pulse;Exercise safety;Signs/symptoms to report;RPE scale;Equipment orientation;Warm up/cool down;Physically active   Exercise Goals   Patient Target Goals Individual exercise RX;Maintain exercise and activity at a moderate intensity;Aerobic activity 30 + minutes/day  5 days/week   Patient Treatment Goal BE ABLE TO RIDE 5 MILES ON BIKE WITHIN 23 MINUTES   Goal Status In progress   Progress Towards Goal PT MEETING 5 MILES IN 23 MINUTES OVER HALF THE TIME NOW, COMPARED TO BEFORE REHAB   Psychosocial Assessment   Stages of Change Maintenance   Psychosocial Intervention   Interventions No intervention indicated   Stress Management Techniques Connects with others;Maintains physical exercise and healthy nutrition;Practices deep breathing   Currently Taking Psychotropic Meds No   Medication Changes No   Psychosocial Education   Education

## 2024-10-09 ENCOUNTER — APPOINTMENT (OUTPATIENT)
Dept: CARDIAC REHAB | Age: 79
End: 2024-10-09
Payer: MEDICARE

## 2024-10-09 ENCOUNTER — HOSPITAL ENCOUNTER (OUTPATIENT)
Dept: CARDIAC REHAB | Age: 79
Setting detail: THERAPIES SERIES
Discharge: HOME | End: 2024-10-09
Payer: MEDICARE

## 2024-10-09 VITALS — WEIGHT: 243.7 LBS | BODY MASS INDEX: 34.97 KG/M2

## 2024-10-09 PROCEDURE — 93798 PHYS/QHP OP CAR RHAB W/ECG: CPT

## 2024-10-09 PROCEDURE — 93797 PHYS/QHP OP CAR RHAB WO ECG: CPT

## 2024-10-09 ASSESSMENT — EXERCISE STRESS TEST
PEAK_RPE: 13
PEAK_BP: 130/68
PEAK_HR: 91
PEAK_METS: 5

## 2024-10-11 ENCOUNTER — HOSPITAL ENCOUNTER (OUTPATIENT)
Dept: CARDIAC REHAB | Age: 79
Setting detail: THERAPIES SERIES
Discharge: HOME | End: 2024-10-11
Payer: MEDICARE

## 2024-10-11 ENCOUNTER — APPOINTMENT (OUTPATIENT)
Dept: CARDIAC REHAB | Age: 79
End: 2024-10-11
Payer: MEDICARE

## 2024-10-11 VITALS — BODY MASS INDEX: 34.69 KG/M2 | WEIGHT: 241.8 LBS

## 2024-10-11 PROCEDURE — 93798 PHYS/QHP OP CAR RHAB W/ECG: CPT

## 2024-10-11 ASSESSMENT — EXERCISE STRESS TEST
PEAK_HR: 90
PEAK_METS: 5.1
PEAK_BP: 142/74
PEAK_RPE: 13

## 2024-10-16 ENCOUNTER — HOSPITAL ENCOUNTER (OUTPATIENT)
Dept: CARDIAC REHAB | Age: 79
Setting detail: THERAPIES SERIES
Discharge: HOME | End: 2024-10-16
Payer: MEDICARE

## 2024-10-16 ENCOUNTER — APPOINTMENT (OUTPATIENT)
Dept: CARDIAC REHAB | Age: 79
End: 2024-10-16
Payer: MEDICARE

## 2024-10-16 VITALS — BODY MASS INDEX: 34.67 KG/M2 | WEIGHT: 241.6 LBS

## 2024-10-16 PROCEDURE — 93797 PHYS/QHP OP CAR RHAB WO ECG: CPT

## 2024-10-16 PROCEDURE — 93798 PHYS/QHP OP CAR RHAB W/ECG: CPT

## 2024-10-16 ASSESSMENT — EXERCISE STRESS TEST
PEAK_BP: 146/74
PEAK_METS: 5.1
PEAK_RPE: 13
PEAK_HR: 85

## 2024-10-18 ENCOUNTER — HOSPITAL ENCOUNTER (OUTPATIENT)
Dept: CARDIAC REHAB | Age: 79
Setting detail: THERAPIES SERIES
Discharge: HOME | End: 2024-10-18
Payer: MEDICARE

## 2024-10-18 ENCOUNTER — APPOINTMENT (OUTPATIENT)
Dept: CARDIAC REHAB | Age: 79
End: 2024-10-18
Payer: MEDICARE

## 2024-10-18 VITALS — BODY MASS INDEX: 34.81 KG/M2 | WEIGHT: 242.6 LBS

## 2024-10-18 PROCEDURE — 93798 PHYS/QHP OP CAR RHAB W/ECG: CPT

## 2024-10-18 ASSESSMENT — EXERCISE STRESS TEST
PEAK_RPE: 13
PEAK_HR: 83
PEAK_METS: 5.2
PEAK_BP: 140/80

## 2024-10-23 ENCOUNTER — APPOINTMENT (OUTPATIENT)
Dept: CARDIAC REHAB | Age: 79
End: 2024-10-23
Payer: MEDICARE

## 2024-10-23 ENCOUNTER — HOSPITAL ENCOUNTER (OUTPATIENT)
Dept: CARDIAC REHAB | Age: 79
Setting detail: THERAPIES SERIES
Discharge: HOME | End: 2024-10-23
Payer: MEDICARE

## 2024-10-23 VITALS — WEIGHT: 241.8 LBS | BODY MASS INDEX: 34.69 KG/M2

## 2024-10-23 PROCEDURE — 93798 PHYS/QHP OP CAR RHAB W/ECG: CPT

## 2024-10-23 ASSESSMENT — EXERCISE STRESS TEST
PEAK_METS: 4.8
PEAK_BP: 112/66
PEAK_RPE: 13
PEAK_HR: 82

## 2024-10-25 ENCOUNTER — HOSPITAL ENCOUNTER (OUTPATIENT)
Dept: CARDIAC REHAB | Age: 79
Setting detail: THERAPIES SERIES
Discharge: HOME | End: 2024-10-25
Payer: MEDICARE

## 2024-10-25 ENCOUNTER — APPOINTMENT (OUTPATIENT)
Dept: CARDIAC REHAB | Age: 79
End: 2024-10-25
Payer: MEDICARE

## 2024-10-25 VITALS — WEIGHT: 244.7 LBS | BODY MASS INDEX: 35.11 KG/M2

## 2024-10-25 PROCEDURE — 93798 PHYS/QHP OP CAR RHAB W/ECG: CPT

## 2024-10-25 ASSESSMENT — EXERCISE STRESS TEST
PEAK_HR: 90
PEAK_METS: 5.2
PEAK_RPE: 13
PEAK_BP: 138/70

## 2024-10-30 ENCOUNTER — APPOINTMENT (OUTPATIENT)
Dept: CARDIAC REHAB | Age: 79
End: 2024-10-30
Payer: MEDICARE

## 2024-10-30 ENCOUNTER — HOSPITAL ENCOUNTER (OUTPATIENT)
Dept: CARDIAC REHAB | Age: 79
Setting detail: THERAPIES SERIES
Discharge: HOME | End: 2024-10-30
Payer: MEDICARE

## 2024-10-30 VITALS — WEIGHT: 245.3 LBS | BODY MASS INDEX: 35.2 KG/M2

## 2024-10-30 PROCEDURE — 93798 PHYS/QHP OP CAR RHAB W/ECG: CPT

## 2024-10-30 ASSESSMENT — EXERCISE STRESS TEST
PEAK_METS: 3.8
PEAK_BP: 130/66
PEAK_HR: 83
PEAK_RPE: 15

## 2024-11-01 ENCOUNTER — APPOINTMENT (OUTPATIENT)
Dept: CARDIAC REHAB | Age: 79
End: 2024-11-01
Payer: MEDICARE

## 2024-11-01 ENCOUNTER — HOSPITAL ENCOUNTER (OUTPATIENT)
Dept: CARDIAC REHAB | Age: 79
Setting detail: THERAPIES SERIES
Discharge: HOME | End: 2024-11-01
Payer: MEDICARE

## 2024-11-01 VITALS — BODY MASS INDEX: 35.14 KG/M2 | WEIGHT: 244.9 LBS

## 2024-11-01 PROCEDURE — 93798 PHYS/QHP OP CAR RHAB W/ECG: CPT

## 2024-11-01 ASSESSMENT — EXERCISE STRESS TEST
PEAK_RPE: 13
PEAK_METS: 5.8
PEAK_BP: 120/82
PEAK_HR: 89

## 2024-11-01 NOTE — FLOWSHEET NOTE
11/01/24 0832   Treatment Diagnosis   Treatment Diagnosis 1 Stent   PCI Date 02/20/24   Referral Date 07/18/24   Significant Cardiovascular History Previous myocardial infarction;Previous PCI  (HTN, HLD)   Co-morbidities Previous MI  (TARA)   Individual Treatment Plan   ITP Visit Type Re-assessment   ITP Next Review Date 11/27/24   Visit #/Total Visits 24/36   EF% 36 %  (1/29/2024 NUCLEAR STRESS TEST)   Risk Stratification Moderate   Supplemental Oxygen   Oxygen Use No   Oxygen Assessment   Stages of Change  Maintenance   Exercise Intervention/Prescription   Mode Treadmill;Stepper;Ergometer   Frequency per week 2   Duration Per Session 49 MIN   Intensity METS       5.8   RPE 13   Progression PT HAS INCREASED HIS EXERCISE TIME AND MET LEVELS SINCE STARTING CARDIAC REHAB   Symptoms with Exercise Denies   Resistance Training Yes   Exercise Activity at Home   Type STATIONARY BIKE   Frequency THREE DAYS A WEEK.   Duration 23-25 MIN   Resistance Training Yes   Exercise Education   Education RPE scale;Physically active;Signs/symptoms to report   Exercise Goals   Patient Target Goals Individual exercise RX;Maintain exercise and activity at a moderate intensity;Aerobic activity 30 + minutes/day  5 days/week   Patient Treatment Goal TO INCREASE HOME EXERCISE TO 30 MIN 3X/WEEK IN NEXT 4 WEEKS   Goal Status In progress   Progress Towards Goal PT MET ORIGINAL GOAL  OF 5 MILES, WILL INCREASE EXERCISE TIME FROM 23-25 MIN TO 30 MIN 3X/WEEK   Psychosocial Assessment   Stages of Change Other (comment)  (PT HAS STRESS ABOUT WIFE IN HOSPICE)   Psychosocial Intervention   Interventions No intervention indicated  (PT DECLINES NEED FOR STAFF INTERVENTION, EMOTIONAL SUPPORT PROVIDED)   Stress Management Techniques Maintains physical exercise and healthy nutrition   Currently Taking Psychotropic Meds No   Medication Changes No   Psychosocial Education   Education Attended class;Benefits of CPR completion;Cardiac meds;Coping

## 2024-11-06 ENCOUNTER — HOSPITAL ENCOUNTER (OUTPATIENT)
Dept: CARDIAC REHAB | Age: 79
Setting detail: THERAPIES SERIES
Discharge: HOME | End: 2024-11-06
Payer: MEDICARE

## 2024-11-06 ENCOUNTER — APPOINTMENT (OUTPATIENT)
Dept: CARDIAC REHAB | Age: 79
End: 2024-11-06
Payer: MEDICARE

## 2024-11-06 VITALS — WEIGHT: 243.3 LBS | BODY MASS INDEX: 34.91 KG/M2

## 2024-11-06 PROCEDURE — 93798 PHYS/QHP OP CAR RHAB W/ECG: CPT

## 2024-11-06 ASSESSMENT — EXERCISE STRESS TEST
PEAK_HR: 90
PEAK_BP: 128/74
PEAK_RPE: 12
PEAK_METS: 5.8

## 2024-11-08 ENCOUNTER — HOSPITAL ENCOUNTER (OUTPATIENT)
Dept: CARDIAC REHAB | Age: 79
Setting detail: THERAPIES SERIES
Discharge: HOME | End: 2024-11-08
Payer: MEDICARE

## 2024-11-08 ENCOUNTER — APPOINTMENT (OUTPATIENT)
Dept: CARDIAC REHAB | Age: 79
End: 2024-11-08
Payer: MEDICARE

## 2024-11-08 VITALS — BODY MASS INDEX: 35.01 KG/M2 | WEIGHT: 244 LBS

## 2024-11-08 PROCEDURE — 93798 PHYS/QHP OP CAR RHAB W/ECG: CPT

## 2024-11-08 ASSESSMENT — EXERCISE STRESS TEST
PEAK_METS: 5.6
PEAK_RPE: 14
PEAK_HR: 81
PEAK_BP: 124/82

## 2024-11-13 ENCOUNTER — APPOINTMENT (OUTPATIENT)
Dept: CARDIAC REHAB | Age: 79
End: 2024-11-13
Payer: MEDICARE

## 2024-11-14 NOTE — CARDIO/PULMONARY
PT ENTERED GYM, AND STATED HIS WIFE PASSED AWAY, AND WILL NOT BE RETURNING TO CARDIAC REHAB. STRESSED STAFFS CONDOLENCES TO PATIENT, AND LET PATIENT KNOW HE WAS WELCOME TO COME BACK AND FINISH HIS SESSIONS AT A LATER TIME. PT VERBALIZED UNDERSTANDING. APPOINTMENTS REMOVED FROM SCHEDULE.

## 2024-11-15 ENCOUNTER — APPOINTMENT (OUTPATIENT)
Dept: CARDIAC REHAB | Age: 79
End: 2024-11-15
Payer: MEDICARE

## 2024-11-20 ENCOUNTER — APPOINTMENT (OUTPATIENT)
Dept: CARDIAC REHAB | Age: 79
End: 2024-11-20
Payer: MEDICARE

## 2024-11-20 ENCOUNTER — HOSPITAL ENCOUNTER (OUTPATIENT)
Dept: CARDIAC REHAB | Age: 79
Setting detail: THERAPIES SERIES
Discharge: HOME | End: 2024-11-20
Payer: MEDICARE

## 2024-11-22 ENCOUNTER — APPOINTMENT (OUTPATIENT)
Dept: CARDIAC REHAB | Age: 79
End: 2024-11-22
Payer: MEDICARE

## 2024-11-27 ENCOUNTER — APPOINTMENT (OUTPATIENT)
Dept: CARDIAC REHAB | Age: 79
End: 2024-11-27
Payer: MEDICARE

## 2024-11-29 ENCOUNTER — APPOINTMENT (OUTPATIENT)
Dept: CARDIAC REHAB | Age: 79
End: 2024-11-29
Payer: MEDICARE

## 2024-12-20 ENCOUNTER — OFFICE VISIT (OUTPATIENT)
Age: 79
End: 2024-12-20
Payer: MEDICARE

## 2024-12-20 VITALS
HEART RATE: 69 BPM | BODY MASS INDEX: 35.35 KG/M2 | SYSTOLIC BLOOD PRESSURE: 128 MMHG | DIASTOLIC BLOOD PRESSURE: 79 MMHG | WEIGHT: 246.4 LBS | OXYGEN SATURATION: 94 %

## 2024-12-20 DIAGNOSIS — I25.119 CORONARY ARTERY DISEASE INVOLVING NATIVE CORONARY ARTERY OF NATIVE HEART WITH ANGINA PECTORIS (HCC): Primary | ICD-10-CM

## 2024-12-20 DIAGNOSIS — I10 ESSENTIAL HYPERTENSION: ICD-10-CM

## 2024-12-20 PROCEDURE — 3078F DIAST BP <80 MM HG: CPT | Performed by: INTERNAL MEDICINE

## 2024-12-20 PROCEDURE — 1123F ACP DISCUSS/DSCN MKR DOCD: CPT | Performed by: INTERNAL MEDICINE

## 2024-12-20 PROCEDURE — 3074F SYST BP LT 130 MM HG: CPT | Performed by: INTERNAL MEDICINE

## 2024-12-20 PROCEDURE — 93000 ELECTROCARDIOGRAM COMPLETE: CPT | Performed by: INTERNAL MEDICINE

## 2024-12-20 PROCEDURE — 1159F MED LIST DOCD IN RCRD: CPT | Performed by: INTERNAL MEDICINE

## 2024-12-20 PROCEDURE — 1160F RVW MEDS BY RX/DR IN RCRD: CPT | Performed by: INTERNAL MEDICINE

## 2024-12-20 PROCEDURE — 99214 OFFICE O/P EST MOD 30 MIN: CPT | Performed by: INTERNAL MEDICINE

## 2024-12-20 RX ORDER — AMLODIPINE BESYLATE 5 MG/1
5 TABLET ORAL DAILY
Qty: 30 TABLET | Refills: 3 | Status: SHIPPED | OUTPATIENT
Start: 2024-12-20

## 2024-12-20 NOTE — PROGRESS NOTES
results for input(s): \"WBC\", \"HGB\", \"HCT\", \"PLT\" in the last 72 hours.  BMP: No results for input(s): \"NA\", \"K\", \"CO2\", \"BUN\", \"CREATININE\", \"LABGLOM\", \"GLUCOSE\" in the last 72 hours.  PT/INR: No results for input(s): \"PROTIME\", \"INR\" in the last 72 hours.  FASTING LIPID PANEL:  Lab Results   Component Value Date/Time    HDL 41 01/11/2024 07:18 AM    LDLDIRECT 129 03/01/2012 09:03 AM    TRIG 109 10/27/2022 07:05 AM     LIVER PROFILE:No results for input(s): \"AST\", \"ALT\", \"LABALBU\" in the last 72 hours.    EKG 12/20/2024 : Normal sinus rhythm, nonspecific QRS widening, inferior infarct    LHC 2/16/2024    Three vessel coronary artery disease.     of RCA- known. There are left to right collaterals.    Patent OM1 stent.    Mid LAD 80% focal stenosis. Successful PTCA/TAM of mid LAD.      Past Medical and Surgical History, Problem List, Allergies, Medications, Labs, Imaging, all reviewed extensively in EMR and with the patient.    Assessment:   Three vessel CAD with history of acute MI in 2017 status post PCI to 1st OM with 3.5 x 15 mm Xience stent.  RCA was noted to be chronically occluded with collaterals. S/p PCI - LAD in 02/2024. OM stent was patent.   Mild ischemic cardiomyopathy with last LVEF 45-50% in January 2024  Essential hypertension  Hyperlipidemia       Plan:   Continue DAPT.  I plan on continuing monotherapy with Plavix after next visit   Maintained on Lipitor, Coreg and lisinopril   DC Aldactone due to breast tenderness   DC Imdur due to facial flushing   Start Norvasc 5 mg in the evening   Coronary risk factor modifications discussed with patient in detail       The patient was counseled regarding heart healthy diet, weight loss and exercise as tolerated. Patient's medications and side effects were discussed. All questions and concerns were addressed to patient's satisfaction.     The patient is to follow up in 6 months or sooner if necessary.     Thank you for allowing me to participate in the care

## 2024-12-21 RX ORDER — CLOPIDOGREL BISULFATE 75 MG/1
75 TABLET ORAL DAILY
Qty: 90 TABLET | Refills: 0 | Status: SHIPPED | OUTPATIENT
Start: 2024-12-21

## 2025-01-06 ENCOUNTER — OFFICE VISIT (OUTPATIENT)
Dept: INTERNAL MEDICINE CLINIC | Age: 80
End: 2025-01-06

## 2025-01-06 VITALS
DIASTOLIC BLOOD PRESSURE: 82 MMHG | SYSTOLIC BLOOD PRESSURE: 150 MMHG | OXYGEN SATURATION: 98 % | BODY MASS INDEX: 35.44 KG/M2 | WEIGHT: 247 LBS | HEART RATE: 75 BPM

## 2025-01-06 DIAGNOSIS — I25.119 CORONARY ARTERY DISEASE INVOLVING NATIVE CORONARY ARTERY OF NATIVE HEART WITH ANGINA PECTORIS (HCC): ICD-10-CM

## 2025-01-06 DIAGNOSIS — L98.9 SKIN LESION: Primary | ICD-10-CM

## 2025-01-06 RX ORDER — CARVEDILOL 25 MG/1
TABLET ORAL
Qty: 180 TABLET | Refills: 3 | Status: SHIPPED | OUTPATIENT
Start: 2025-01-06

## 2025-01-06 ASSESSMENT — ENCOUNTER SYMPTOMS
COUGH: 0
DIARRHEA: 0
ABDOMINAL DISTENTION: 0
EYE PAIN: 0
BLOOD IN STOOL: 0
WHEEZING: 0
TROUBLE SWALLOWING: 0
EYE DISCHARGE: 0
COLOR CHANGE: 0
SHORTNESS OF BREATH: 0

## 2025-01-06 ASSESSMENT — PATIENT HEALTH QUESTIONNAIRE - PHQ9
SUM OF ALL RESPONSES TO PHQ QUESTIONS 1-9: 2
2. FEELING DOWN, DEPRESSED OR HOPELESS: MORE THAN HALF THE DAYS
SUM OF ALL RESPONSES TO PHQ QUESTIONS 1-9: 2
1. LITTLE INTEREST OR PLEASURE IN DOING THINGS: NOT AT ALL
SUM OF ALL RESPONSES TO PHQ QUESTIONS 1-9: 2
SUM OF ALL RESPONSES TO PHQ QUESTIONS 1-9: 2
SUM OF ALL RESPONSES TO PHQ9 QUESTIONS 1 & 2: 2

## 2025-01-06 NOTE — PROGRESS NOTES
\"Have you been to the ER, urgent care clinic since your last visit?  Hospitalized since your last visit?\"    NO    “Have you seen or consulted any other health care providers outside our system since your last visit?”    NO           
time.      Cranial Nerves: No cranial nerve deficit.      Sensory: No sensory deficit.      Motor: No atrophy or abnormal muscle tone.      Coordination: Coordination normal.   Psychiatric:         Mood and Affect: Mood is not anxious. Affect is not angry.         Speech: Speech is not slurred.         Behavior: Behavior normal. Behavior is not aggressive.         Thought Content: Thought content does not include homicidal ideation.         Cognition and Memory: Memory is not impaired.       BP (!) 150/82   Pulse 75   Wt 112 kg (247 lb)   SpO2 98%   BMI 35.44 kg/m²     Assessment:       Diagnosis Orders   1. Skin lesion  [facility], [facility]      2. Coronary artery disease involving native coronary artery of native heart with angina pectoris (HCC)  carvedilol (COREG) 25 MG tablet                Plan:   Assessment & Plan   Return in about 4 months (around 5/6/2025).    Orders Placed This Encounter   Procedures    Karine Mooney MD, [facility], [facility]     Referral Priority:   Routine     Referral Type:   Eval and Treat     Referral Reason:   Specialty Services Required     Referred to Provider:   Karine Cox MD     Requested Specialty:   Dermatology     Number of Visits Requested:   1     Orders Placed This Encounter   Medications    carvedilol (COREG) 25 MG tablet     Sig: TAKE 1 TABLET BY MOUTH IN THE MORNING AND 1 IN THE EVENING WITH MEALS     Dispense:  180 tablet     Refill:  3    Skin lesion left shoulder area and face  Face looks inflamed seborrheic keratosis  One on shoulder needs shave bx  Ref to derm  Labs vital rev  Lost wife  few months ago  Bereavement  No depression  Has family support daughters    Off aldactone due to breast tenderness  Antiandrogen affect  Off imdur for facial flushing     Patient given educational materials - see patient instructions.Discussed use, benefit, and side effects of prescribed medications.  All patientquestions answered. Pt voiced

## 2025-03-03 RX ORDER — AMLODIPINE BESYLATE 5 MG/1
5 TABLET ORAL DAILY
Qty: 90 TABLET | Refills: 3 | Status: SHIPPED | OUTPATIENT
Start: 2025-03-03

## 2025-03-03 RX ORDER — CLOPIDOGREL BISULFATE 75 MG/1
75 TABLET ORAL DAILY
Qty: 90 TABLET | Refills: 0 | Status: SHIPPED | OUTPATIENT
Start: 2025-03-03

## 2025-03-03 RX ORDER — AMLODIPINE BESYLATE 5 MG/1
5 TABLET ORAL DAILY
Qty: 90 TABLET | Refills: 0 | OUTPATIENT
Start: 2025-03-03

## 2025-04-23 RX ORDER — LISINOPRIL 20 MG/1
20 TABLET ORAL 2 TIMES DAILY
Qty: 180 TABLET | Refills: 0 | Status: SHIPPED | OUTPATIENT
Start: 2025-04-23 | End: 2025-07-22

## 2025-05-12 ENCOUNTER — OFFICE VISIT (OUTPATIENT)
Dept: INTERNAL MEDICINE CLINIC | Age: 80
End: 2025-05-12
Payer: MEDICARE

## 2025-05-12 VITALS
SYSTOLIC BLOOD PRESSURE: 136 MMHG | OXYGEN SATURATION: 98 % | WEIGHT: 254 LBS | HEIGHT: 71 IN | HEART RATE: 66 BPM | BODY MASS INDEX: 35.56 KG/M2 | DIASTOLIC BLOOD PRESSURE: 82 MMHG

## 2025-05-12 DIAGNOSIS — I50.22 CHRONIC SYSTOLIC CONGESTIVE HEART FAILURE (HCC): ICD-10-CM

## 2025-05-12 DIAGNOSIS — Z00.00 MEDICARE ANNUAL WELLNESS VISIT, SUBSEQUENT: ICD-10-CM

## 2025-05-12 DIAGNOSIS — R06.09 DOE (DYSPNEA ON EXERTION): ICD-10-CM

## 2025-05-12 DIAGNOSIS — E66.01 MORBID (SEVERE) OBESITY DUE TO EXCESS CALORIES (HCC): ICD-10-CM

## 2025-05-12 DIAGNOSIS — J84.112 UIP (USUAL INTERSTITIAL PNEUMONITIS) (HCC): ICD-10-CM

## 2025-05-12 DIAGNOSIS — E78.2 MIXED DYSLIPIDEMIA: Primary | ICD-10-CM

## 2025-05-12 DIAGNOSIS — I25.119 CORONARY ARTERY DISEASE INVOLVING NATIVE CORONARY ARTERY OF NATIVE HEART WITH ANGINA PECTORIS: ICD-10-CM

## 2025-05-12 DIAGNOSIS — I10 ESSENTIAL HYPERTENSION: ICD-10-CM

## 2025-05-12 PROCEDURE — G0439 PPPS, SUBSEQ VISIT: HCPCS | Performed by: INTERNAL MEDICINE

## 2025-05-12 PROCEDURE — 3075F SYST BP GE 130 - 139MM HG: CPT | Performed by: INTERNAL MEDICINE

## 2025-05-12 PROCEDURE — 1123F ACP DISCUSS/DSCN MKR DOCD: CPT | Performed by: INTERNAL MEDICINE

## 2025-05-12 PROCEDURE — 3079F DIAST BP 80-89 MM HG: CPT | Performed by: INTERNAL MEDICINE

## 2025-05-12 PROCEDURE — 1159F MED LIST DOCD IN RCRD: CPT | Performed by: INTERNAL MEDICINE

## 2025-05-12 RX ORDER — SPIRONOLACTONE 25 MG/1
TABLET ORAL
COMMUNITY
Start: 2024-07-18

## 2025-05-12 RX ORDER — TRAZODONE HYDROCHLORIDE 100 MG/1
100 TABLET ORAL NIGHTLY
Qty: 90 TABLET | Refills: 1 | Status: SHIPPED | OUTPATIENT
Start: 2025-05-12

## 2025-05-12 RX ORDER — CLOPIDOGREL BISULFATE 75 MG/1
75 TABLET ORAL DAILY
Qty: 90 TABLET | Refills: 3 | Status: SHIPPED | OUTPATIENT
Start: 2025-05-12

## 2025-05-12 RX ORDER — POTASSIUM CHLORIDE 1500 MG/1
20 TABLET, EXTENDED RELEASE ORAL
COMMUNITY
Start: 2024-07-30

## 2025-05-12 SDOH — ECONOMIC STABILITY: FOOD INSECURITY: WITHIN THE PAST 12 MONTHS, YOU WORRIED THAT YOUR FOOD WOULD RUN OUT BEFORE YOU GOT MONEY TO BUY MORE.: NEVER TRUE

## 2025-05-12 SDOH — ECONOMIC STABILITY: FOOD INSECURITY: WITHIN THE PAST 12 MONTHS, THE FOOD YOU BOUGHT JUST DIDN'T LAST AND YOU DIDN'T HAVE MONEY TO GET MORE.: NEVER TRUE

## 2025-05-12 ASSESSMENT — PATIENT HEALTH QUESTIONNAIRE - PHQ9
6. FEELING BAD ABOUT YOURSELF - OR THAT YOU ARE A FAILURE OR HAVE LET YOURSELF OR YOUR FAMILY DOWN: NOT AT ALL
8. MOVING OR SPEAKING SO SLOWLY THAT OTHER PEOPLE COULD HAVE NOTICED. OR THE OPPOSITE, BEING SO FIGETY OR RESTLESS THAT YOU HAVE BEEN MOVING AROUND A LOT MORE THAN USUAL: NOT AT ALL
3. TROUBLE FALLING OR STAYING ASLEEP: NOT AT ALL
10. IF YOU CHECKED OFF ANY PROBLEMS, HOW DIFFICULT HAVE THESE PROBLEMS MADE IT FOR YOU TO DO YOUR WORK, TAKE CARE OF THINGS AT HOME, OR GET ALONG WITH OTHER PEOPLE: NOT DIFFICULT AT ALL
2. FEELING DOWN, DEPRESSED OR HOPELESS: SEVERAL DAYS
4. FEELING TIRED OR HAVING LITTLE ENERGY: NOT AT ALL
SUM OF ALL RESPONSES TO PHQ QUESTIONS 1-9: 4
1. LITTLE INTEREST OR PLEASURE IN DOING THINGS: NEARLY EVERY DAY
9. THOUGHTS THAT YOU WOULD BE BETTER OFF DEAD, OR OF HURTING YOURSELF: NOT AT ALL
SUM OF ALL RESPONSES TO PHQ QUESTIONS 1-9: 4
5. POOR APPETITE OR OVEREATING: NOT AT ALL
SUM OF ALL RESPONSES TO PHQ QUESTIONS 1-9: 4
7. TROUBLE CONCENTRATING ON THINGS, SUCH AS READING THE NEWSPAPER OR WATCHING TELEVISION: NOT AT ALL
SUM OF ALL RESPONSES TO PHQ QUESTIONS 1-9: 4

## 2025-05-12 ASSESSMENT — LIFESTYLE VARIABLES
HOW OFTEN DO YOU HAVE A DRINK CONTAINING ALCOHOL: NEVER
HOW MANY STANDARD DRINKS CONTAINING ALCOHOL DO YOU HAVE ON A TYPICAL DAY: PATIENT DOES NOT DRINK

## 2025-05-12 NOTE — PROGRESS NOTES
Medicare Annual Wellness Visit    Armando ARGUETA [employer] is here for Medicare AWV    Assessment & Plan   Mixed dyslipidemia  -     Lipid, Fasting; Future  Coronary artery disease involving native coronary artery of native heart with angina pectoris  Essential hypertension  Chronic systolic congestive heart failure (HCC)  -     Comprehensive Metabolic Panel; Future  -     CBC with Auto Differential; Future  MATHEW (dyspnea on exertion)  -     Comprehensive Metabolic Panel; Future  -     CBC with Auto Differential; Future  UIP (usual interstitial pneumonitis) (HCC)  Morbid (severe) obesity due to excess calories (E66.01)  Body mass index [BMI] 35.0-35.9, adult (Z68.35)  Medicare annual wellness visit, subsequent  Pt attending bereavement  counceling    Not caliming to be depressed but poor sleep and low motivation  Exertional dyspn  Had catha stent LAD dr hadley  Ef is  36 percent  Pt to call cardio for followup apt  Will add trazodone  Will help with sleep and mood  Off amlodipine  No leg edema on most days  Chf sys compensated       Return in about 2 months (around 7/12/2025).     Subjective       Patient's complete Health Risk Assessment and screening values have been reviewed and are found in Flowsheets. The following problems were reviewed today and where indicated follow up appointments were made and/or referrals ordered.    Positive Risk Factor Screenings with Interventions:    Fall Risk:  Do you feel unsteady or are you worried about falling? : (!) yes  2 or more falls in past year?: no  Fall with injury in past year?: no     Interventions:    Reviewed medications, home hazards, visual acuity, and co-morbidities that can increase risk for falls           Self-assessment of health:  In general, how would you say your health is?: (!) Poor    Interventions:  Patient comments: wt gain    General HRA Questions:  Select all that apply: (!) New or Increased Fatigue, Loneliness  Interventions Fatigue:  Patient comments: due

## 2025-05-13 ENCOUNTER — HOSPITAL ENCOUNTER (OUTPATIENT)
Age: 80
Setting detail: SPECIMEN
Discharge: HOME | End: 2025-05-13

## 2025-05-13 DIAGNOSIS — E78.2 MIXED DYSLIPIDEMIA: ICD-10-CM

## 2025-05-13 DIAGNOSIS — R06.09 DOE (DYSPNEA ON EXERTION): ICD-10-CM

## 2025-05-13 DIAGNOSIS — I50.22 CHRONIC SYSTOLIC CONGESTIVE HEART FAILURE (HCC): ICD-10-CM

## 2025-05-13 LAB
ALBUMIN SERPL-MCNC: 4.2 G/DL (ref 3.5–5.2)
ALBUMIN/GLOB SERPL: 1.8 {RATIO} (ref 1–2.5)
ALP SERPL-CCNC: 68 U/L (ref 40–129)
ALT SERPL-CCNC: 17 U/L (ref 10–50)
ANION GAP SERPL CALCULATED.3IONS-SCNC: 11 MMOL/L (ref 9–16)
AST SERPL-CCNC: 16 U/L (ref 10–50)
BASOPHILS # BLD: 0.03 K/UL (ref 0–0.2)
BASOPHILS NFR BLD: 1 % (ref 0–2)
BILIRUB SERPL-MCNC: 0.5 MG/DL (ref 0–1.2)
BUN SERPL-MCNC: 15 MG/DL (ref 8–23)
CALCIUM SERPL-MCNC: 9 MG/DL (ref 8.6–10.4)
CHLORIDE SERPL-SCNC: 103 MMOL/L (ref 98–107)
CHOLEST SERPL-MCNC: 185 MG/DL (ref 0–199)
CHOLESTEROL/HDL RATIO: 5.6
CO2 SERPL-SCNC: 26 MMOL/L (ref 20–31)
CREAT SERPL-MCNC: 0.9 MG/DL (ref 0.7–1.2)
EOSINOPHIL # BLD: 0.19 K/UL (ref 0–0.44)
EOSINOPHILS RELATIVE PERCENT: 4 % (ref 1–4)
ERYTHROCYTE [DISTWIDTH] IN BLOOD BY AUTOMATED COUNT: 14.4 % (ref 11.8–14.4)
GFR, ESTIMATED: 85 ML/MIN/1.73M2
GLUCOSE SERPL-MCNC: 96 MG/DL (ref 74–99)
HCT VFR BLD AUTO: 43 % (ref 40.7–50.3)
HDLC SERPL-MCNC: 33 MG/DL
HGB BLD-MCNC: 13.4 G/DL (ref 13–17)
IMM GRANULOCYTES # BLD AUTO: <0.03 K/UL (ref 0–0.3)
IMM GRANULOCYTES NFR BLD: 0 %
LDLC SERPL CALC-MCNC: 82 MG/DL (ref 0–100)
LYMPHOCYTES NFR BLD: 1.2 K/UL (ref 1.1–3.7)
LYMPHOCYTES RELATIVE PERCENT: 22 % (ref 24–43)
MCH RBC QN AUTO: 27 PG (ref 25.2–33.5)
MCHC RBC AUTO-ENTMCNC: 31.2 G/DL (ref 28.4–34.8)
MCV RBC AUTO: 86.5 FL (ref 82.6–102.9)
MONOCYTES NFR BLD: 0.54 K/UL (ref 0.1–1.2)
MONOCYTES NFR BLD: 10 % (ref 3–12)
NEUTROPHILS NFR BLD: 63 % (ref 36–65)
NEUTS SEG NFR BLD: 3.44 K/UL (ref 1.5–8.1)
NRBC BLD-RTO: 0 PER 100 WBC
PLATELET # BLD AUTO: 205 K/UL (ref 138–453)
PMV BLD AUTO: 10.2 FL (ref 8.1–13.5)
POTASSIUM SERPL-SCNC: 4.2 MMOL/L (ref 3.7–5.3)
PROT SERPL-MCNC: 6.5 G/DL (ref 6.6–8.7)
RBC # BLD AUTO: 4.97 M/UL (ref 4.21–5.77)
SODIUM SERPL-SCNC: 140 MMOL/L (ref 136–145)
TRIGL SERPL-MCNC: 352 MG/DL (ref 0–149)
VLDLC SERPL CALC-MCNC: 70 MG/DL (ref 1–30)
WBC OTHER # BLD: 5.4 K/UL (ref 3.5–11.3)

## 2025-06-27 RX ORDER — LISINOPRIL 20 MG/1
20 TABLET ORAL 2 TIMES DAILY
Qty: 180 TABLET | Refills: 0 | Status: SHIPPED | OUTPATIENT
Start: 2025-06-27 | End: 2025-09-25

## 2025-07-19 ENCOUNTER — APPOINTMENT (OUTPATIENT)
Dept: GENERAL RADIOLOGY | Age: 80
End: 2025-07-19
Attending: EMERGENCY MEDICINE
Payer: MEDICARE

## 2025-07-19 ENCOUNTER — HOSPITAL ENCOUNTER (EMERGENCY)
Age: 80
Discharge: HOME | End: 2025-07-19
Attending: EMERGENCY MEDICINE
Payer: MEDICARE

## 2025-07-19 VITALS
BODY MASS INDEX: 32.93 KG/M2 | DIASTOLIC BLOOD PRESSURE: 72 MMHG | TEMPERATURE: 99.3 F | SYSTOLIC BLOOD PRESSURE: 149 MMHG | HEART RATE: 79 BPM | HEIGHT: 70 IN | OXYGEN SATURATION: 95 % | RESPIRATION RATE: 18 BRPM | WEIGHT: 230 LBS

## 2025-07-19 DIAGNOSIS — R05.8 POST-VIRAL COUGH SYNDROME: Primary | ICD-10-CM

## 2025-07-19 LAB
ALBUMIN SERPL-MCNC: 3.9 G/DL (ref 3.5–5.2)
ALP SERPL-CCNC: 64 U/L (ref 40–129)
ALT SERPL-CCNC: 16 U/L (ref 10–50)
ANION GAP SERPL CALCULATED.3IONS-SCNC: 14 MMOL/L (ref 9–16)
AST SERPL-CCNC: 20 U/L (ref 10–50)
BASOPHILS # BLD: <0.03 K/UL (ref 0–0.2)
BASOPHILS NFR BLD: 0 % (ref 0–2)
BILIRUB SERPL-MCNC: 0.7 MG/DL (ref 0–1.2)
BNP SERPL-MCNC: 509 PG/ML (ref 0–300)
BUN SERPL-MCNC: 13 MG/DL (ref 8–23)
CALCIUM SERPL-MCNC: 9.3 MG/DL (ref 8.6–10.4)
CHLORIDE SERPL-SCNC: 98 MMOL/L (ref 98–107)
CO2 SERPL-SCNC: 22 MMOL/L (ref 20–31)
CREAT SERPL-MCNC: 0.9 MG/DL (ref 0.7–1.2)
EKG ATRIAL RATE: 71 BPM
EKG P AXIS: 42 DEGREES
EKG P-R INTERVAL: 182 MS
EKG Q-T INTERVAL: 416 MS
EKG QRS DURATION: 122 MS
EKG QTC CALCULATION (BAZETT): 452 MS
EKG R AXIS: -8 DEGREES
EKG T AXIS: 32 DEGREES
EKG VENTRICULAR RATE: 71 BPM
EOSINOPHIL # BLD: 0.24 K/UL (ref 0–0.44)
EOSINOPHILS RELATIVE PERCENT: 3 % (ref 0–4)
ERYTHROCYTE [DISTWIDTH] IN BLOOD BY AUTOMATED COUNT: 14.3 % (ref 11.5–14.9)
FLUAV RNA RESP QL NAA+PROBE: NOT DETECTED
FLUBV RNA RESP QL NAA+PROBE: NOT DETECTED
GFR, ESTIMATED: 85 ML/MIN/1.73M2
GLUCOSE SERPL-MCNC: 120 MG/DL (ref 74–99)
HCT VFR BLD AUTO: 41.3 % (ref 41–53)
HGB BLD-MCNC: 12.9 G/DL (ref 13.5–17.5)
IMM GRANULOCYTES # BLD AUTO: <0.03 K/UL (ref 0–0.3)
IMM GRANULOCYTES NFR BLD: 0 %
INR PPP: 1
LIPASE SERPL-CCNC: 27 U/L (ref 13–60)
LYMPHOCYTES NFR BLD: 1.17 K/UL (ref 1.1–3.7)
LYMPHOCYTES RELATIVE PERCENT: 16 % (ref 24–44)
MAGNESIUM SERPL-MCNC: 2.1 MG/DL (ref 1.6–2.4)
MCH RBC QN AUTO: 25.6 PG (ref 26–34)
MCHC RBC AUTO-ENTMCNC: 31.2 G/DL (ref 31–37)
MCV RBC AUTO: 82.1 FL (ref 80–100)
MONOCYTES NFR BLD: 1.01 K/UL (ref 0.1–1.2)
MONOCYTES NFR BLD: 14 % (ref 3–12)
NEUTROPHILS NFR BLD: 67 % (ref 36–66)
NEUTS SEG NFR BLD: 4.77 K/UL (ref 1.5–8.1)
NRBC BLD-RTO: 0 PER 100 WBC
PLATELET # BLD AUTO: 192 K/UL (ref 150–450)
PMV BLD AUTO: 9 FL (ref 8–13.5)
POTASSIUM SERPL-SCNC: 4.2 MMOL/L (ref 3.7–5.3)
PROT SERPL-MCNC: 7 G/DL (ref 6.6–8.7)
PROTHROMBIN TIME: 13.5 SEC (ref 11.8–14.6)
RBC # BLD AUTO: 5.03 M/UL (ref 4.21–5.77)
SARS-COV-2 RNA RESP QL NAA+PROBE: NOT DETECTED
SODIUM SERPL-SCNC: 134 MMOL/L (ref 136–145)
SOURCE: NORMAL
SPECIMEN DESCRIPTION: NORMAL
TROPONIN I SERPL HS-MCNC: 16 NG/L (ref 0–22)
TROPONIN I SERPL HS-MCNC: 17 NG/L (ref 0–22)
WBC OTHER # BLD: 7.2 K/UL (ref 3.5–11)

## 2025-07-19 PROCEDURE — 99285 EMERGENCY DEPT VISIT HI MDM: CPT

## 2025-07-19 PROCEDURE — 85610 PROTHROMBIN TIME: CPT

## 2025-07-19 PROCEDURE — 87636 SARSCOV2 & INF A&B AMP PRB: CPT

## 2025-07-19 PROCEDURE — 85025 COMPLETE CBC W/AUTO DIFF WBC: CPT

## 2025-07-19 PROCEDURE — 71045 X-RAY EXAM CHEST 1 VIEW: CPT

## 2025-07-19 PROCEDURE — 83735 ASSAY OF MAGNESIUM: CPT

## 2025-07-19 PROCEDURE — 83690 ASSAY OF LIPASE: CPT

## 2025-07-19 PROCEDURE — 6370000000 HC RX 637 (ALT 250 FOR IP): Performed by: EMERGENCY MEDICINE

## 2025-07-19 PROCEDURE — 80053 COMPREHEN METABOLIC PANEL: CPT

## 2025-07-19 PROCEDURE — 84484 ASSAY OF TROPONIN QUANT: CPT

## 2025-07-19 PROCEDURE — 83880 ASSAY OF NATRIURETIC PEPTIDE: CPT

## 2025-07-19 PROCEDURE — 94640 AIRWAY INHALATION TREATMENT: CPT

## 2025-07-19 PROCEDURE — 36415 COLL VENOUS BLD VENIPUNCTURE: CPT

## 2025-07-19 PROCEDURE — 93005 ELECTROCARDIOGRAM TRACING: CPT | Performed by: EMERGENCY MEDICINE

## 2025-07-19 RX ORDER — BENZONATATE 100 MG/1
100 CAPSULE ORAL 2 TIMES DAILY PRN
Qty: 15 CAPSULE | Refills: 0 | Status: SHIPPED | OUTPATIENT
Start: 2025-07-19

## 2025-07-19 RX ORDER — IPRATROPIUM BROMIDE AND ALBUTEROL SULFATE 2.5; .5 MG/3ML; MG/3ML
1 SOLUTION RESPIRATORY (INHALATION) EVERY 4 HOURS PRN
Status: DISCONTINUED | OUTPATIENT
Start: 2025-07-19 | End: 2025-07-19 | Stop reason: HOSPADM

## 2025-07-19 RX ORDER — PREDNISONE 50 MG/1
50 TABLET ORAL DAILY
Qty: 5 TABLET | Refills: 0 | Status: SHIPPED | OUTPATIENT
Start: 2025-07-19 | End: 2025-07-24

## 2025-07-19 RX ADMIN — IPRATROPIUM BROMIDE AND ALBUTEROL SULFATE 1 DOSE: .5; 2.5 SOLUTION RESPIRATORY (INHALATION) at 06:27

## 2025-07-19 RX ADMIN — PREDNISONE 50 MG: 20 TABLET ORAL at 06:37

## 2025-07-19 ASSESSMENT — PAIN SCALES - GENERAL
PAINLEVEL_OUTOF10: 10
PAINLEVEL_OUTOF10: 0

## 2025-07-19 ASSESSMENT — ENCOUNTER SYMPTOMS
COUGH: 1
ABDOMINAL PAIN: 0
SHORTNESS OF BREATH: 1
COLOR CHANGE: 0
EYE PAIN: 0
BACK PAIN: 0

## 2025-07-19 ASSESSMENT — PAIN DESCRIPTION - LOCATION: LOCATION: BACK

## 2025-07-19 ASSESSMENT — PAIN - FUNCTIONAL ASSESSMENT
PAIN_FUNCTIONAL_ASSESSMENT: 0-10
PAIN_FUNCTIONAL_ASSESSMENT: 0-10

## 2025-07-19 NOTE — ED NOTES
Report given to SHYAM Sood from  ED.   Report method in person   The following was reviewed with receiving RN:   Current vital signs:  BP (!) 142/77   Pulse 70   Temp 99.3 °F (37.4 °C) (Oral)   Resp 21   Ht 1.778 m (5' 10\")   Wt 104.3 kg (230 lb)   SpO2 98%   BMI 33.00 kg/m²                MEWS Score: 2     Any medication or safety alerts were reviewed. Any pending diagnostics and notifications were also reviewed, as well as any safety concerns or issues, abnormal labs, abnormal imaging, and abnormal assessment findings. Questions were answered.

## 2025-07-19 NOTE — ED NOTES
Mode of arrival (squad #, walk in, police, etc) : walk-in        Chief complaint(s): Cough, nasal congestion, SOB        Arrival Note (brief scenario, treatment PTA, etc).: Reports of cough and nasal congestion x 10 days. Patient says he feels progressively worse and having shortness of breath last few days.         C= \"Have you ever felt that you should Cut down on your drinking?\"  No  A= \"Have people Annoyed you by criticizing your drinking?\"  No  G= \"Have you ever felt bad or Guilty about your drinking?\"  No  E= \"Have you ever had a drink as an Eye-opener first thing in the morning to steady your nerves or to help a hangover?\"  No      Deferred []      Reason for deferring: N/A    *If yes to two or more: probable alcohol abuse.*

## 2025-07-19 NOTE — ED PROVIDER NOTES
EMERGENCY DEPARTMENT ENCOUNTER    Pt Name: Armando Belcher  MRN: [id number]  Birthdate 1945  Date of evaluation: 7/19/25  CHIEF COMPLAINT       Chief Complaint   Patient presents with    Cough     X 10 days    Nasal Congestion     X 10 days    Shortness of Breath     HISTORY OF PRESENT ILLNESS   80-year-old male presents for complaints of cough, congestion.  He states that he has been sick for at least the last 10 days he states that he has had cough has been coughing up sputum, he denies any associated chest pain but does report shortness of breath.  He denies any recent travel or known sick contacts.  He reports a history of heart issues denies any known history of COPD or asthma but does report a history of asbestosis.    The history is provided by the patient.           REVIEW OF SYSTEMS     Review of Systems   Constitutional:  Negative for chills and fever.   HENT:  Positive for congestion. Negative for ear pain.    Eyes:  Negative for pain.   Respiratory:  Positive for cough and shortness of breath.    Cardiovascular:  Negative for chest pain, palpitations and leg swelling.   Gastrointestinal:  Negative for abdominal pain.   Genitourinary:  Negative for dysuria and flank pain.   Musculoskeletal:  Negative for back pain.   Skin:  Negative for color change.   Neurological:  Negative for numbness and headaches.   Psychiatric/Behavioral:  Negative for confusion.    All other systems reviewed and are negative.    PASTMEDICAL HISTORY     Past Medical History:   Diagnosis Date    Acute gouty arthropathy     Acute sinusitis 02/10/2016    Backache, unspecified     Cellulitis and abscess of upper arm and forearm     Degeneration of lumbar or lumbosacral intervertebral disc     Essential hypertension, benign     Obstructive sleep apnea on CPAP     Osteoarthrosis, unspecified whether generalized or localized, unspecified site     Other and unspecified hyperlipidemia     Other malaise and fatigue     Other psoriasis

## 2025-07-21 LAB
EKG ATRIAL RATE: 71 BPM
EKG P AXIS: 42 DEGREES
EKG P-R INTERVAL: 182 MS
EKG Q-T INTERVAL: 416 MS
EKG QRS DURATION: 122 MS
EKG QTC CALCULATION (BAZETT): 452 MS
EKG R AXIS: -8 DEGREES
EKG T AXIS: 32 DEGREES
EKG VENTRICULAR RATE: 71 BPM

## 2025-07-21 PROCEDURE — 93010 ELECTROCARDIOGRAM REPORT: CPT | Performed by: INTERNAL MEDICINE

## 2025-07-21 RX ORDER — LISINOPRIL 20 MG/1
20 TABLET ORAL 2 TIMES DAILY
Qty: 180 TABLET | Refills: 0 | Status: SHIPPED | OUTPATIENT
Start: 2025-07-21 | End: 2025-10-19

## 2025-07-22 ENCOUNTER — OFFICE VISIT (OUTPATIENT)
Dept: INTERNAL MEDICINE CLINIC | Age: 80
End: 2025-07-22
Payer: MEDICARE

## 2025-07-22 VITALS
WEIGHT: 237 LBS | DIASTOLIC BLOOD PRESSURE: 86 MMHG | HEART RATE: 70 BPM | HEIGHT: 70 IN | BODY MASS INDEX: 33.93 KG/M2 | OXYGEN SATURATION: 98 % | TEMPERATURE: 97.8 F | SYSTOLIC BLOOD PRESSURE: 138 MMHG

## 2025-07-22 DIAGNOSIS — E78.2 MIXED DYSLIPIDEMIA: ICD-10-CM

## 2025-07-22 DIAGNOSIS — I50.22 CHRONIC SYSTOLIC CONGESTIVE HEART FAILURE (HCC): ICD-10-CM

## 2025-07-22 DIAGNOSIS — I25.5 ISCHEMIC CARDIOMYOPATHY: ICD-10-CM

## 2025-07-22 DIAGNOSIS — J40 BRONCHITIS: Primary | ICD-10-CM

## 2025-07-22 DIAGNOSIS — I25.10 CORONARY ARTERY DISEASE INVOLVING NATIVE CORONARY ARTERY OF NATIVE HEART WITHOUT ANGINA PECTORIS: ICD-10-CM

## 2025-07-22 DIAGNOSIS — R06.02 SHORTNESS OF BREATH: ICD-10-CM

## 2025-07-22 DIAGNOSIS — I10 ESSENTIAL HYPERTENSION: ICD-10-CM

## 2025-07-22 PROCEDURE — 99214 OFFICE O/P EST MOD 30 MIN: CPT

## 2025-07-22 PROCEDURE — 3075F SYST BP GE 130 - 139MM HG: CPT

## 2025-07-22 PROCEDURE — 1123F ACP DISCUSS/DSCN MKR DOCD: CPT

## 2025-07-22 PROCEDURE — 3079F DIAST BP 80-89 MM HG: CPT

## 2025-07-22 PROCEDURE — 1159F MED LIST DOCD IN RCRD: CPT

## 2025-07-22 ASSESSMENT — ENCOUNTER SYMPTOMS
WHEEZING: 0
COUGH: 1
NAUSEA: 0
SHORTNESS OF BREATH: 1
VOMITING: 0
BACK PAIN: 0
ABDOMINAL PAIN: 0

## 2025-07-22 NOTE — PROGRESS NOTES
Have you been to the ER, urgent care clinic since your last visit?  Hospitalized since your last visit?   [facility] ED 7/19/25 cough, nasal congestion, SOB    Have you seen or consulted any other health care providers outside our system since your last visit?   NO          
tablet by mouth      spironolactone (ALDACTONE) 25 MG tablet Take by mouth      clopidogrel (PLAVIX) 75 MG tablet Take 1 tablet by mouth daily 90 tablet 3    traZODone (DESYREL) 100 MG tablet Take 1 tablet by mouth nightly 90 tablet 1    carvedilol (COREG) 25 MG tablet TAKE 1 TABLET BY MOUTH IN THE MORNING AND 1 IN THE EVENING WITH MEALS 180 tablet 3    atorvastatin (LIPITOR) 20 MG tablet Take 2 tablets by mouth daily 90 tablet 3    furosemide (LASIX) 20 MG tablet Take 1 tablet by mouth 2 times daily 180 tablet 3    vitamin C (ASCORBIC ACID) 500 MG tablet Take 2 tablets by mouth nightly      vitamin E 400 UNIT capsule Take 1 capsule by mouth nightly      magnesium (MAGNESIUM-OXIDE) 250 MG TABS tablet Take 1 tablet by mouth nightly      zinc 50 MG CAPS Take by mouth nightly      MULTIPLE VITAMIN PO Take by mouth daily      aspirin 81 MG EC tablet Take 1 tablet by mouth daily      tamsulosin (FLOMAX) 0.4 MG capsule Take 1 capsule by mouth every morning      dutasteride (AVODART) 0.5 MG capsule Take 1 capsule by mouth daily      Cholecalciferol (VITAMIN D) 125 MCG (5000 UT) CAPS Take 2 capsules by mouth at bedtime 78431K in the winter, 5000 in the summer       No current facility-administered medications for this visit.       Social History     Tobacco Use    Smoking status: Former     Current packs/day: 0.00     Average packs/day: 2.0 packs/day for 20.0 years (40.0 ttl pk-yrs)     Types: Cigarettes     Start date: 1950     Quit date: 1970     Years since quittin.7    Smokeless tobacco: Never   Substance Use Topics    Alcohol use: No     Alcohol/week: 0.0 standard drinks of alcohol    Drug use: No       Family History   Problem Relation Age of Onset    Cancer Father     Cancer Brother         REVIEW OF SYSTEMS:  Review of Systems   Constitutional:  Negative for activity change, chills and fever.   Respiratory:  Positive for cough and shortness of breath. Negative for wheezing.    Cardiovascular:

## 2025-08-01 ENCOUNTER — OFFICE VISIT (OUTPATIENT)
Age: 80
End: 2025-08-01
Payer: MEDICARE

## 2025-08-01 VITALS
OXYGEN SATURATION: 95 % | SYSTOLIC BLOOD PRESSURE: 151 MMHG | BODY MASS INDEX: 32.76 KG/M2 | HEIGHT: 71 IN | WEIGHT: 234 LBS | HEART RATE: 66 BPM | DIASTOLIC BLOOD PRESSURE: 75 MMHG

## 2025-08-01 DIAGNOSIS — R06.02 SHORTNESS OF BREATH: Primary | ICD-10-CM

## 2025-08-01 PROCEDURE — 1159F MED LIST DOCD IN RCRD: CPT | Performed by: INTERNAL MEDICINE

## 2025-08-01 PROCEDURE — 99214 OFFICE O/P EST MOD 30 MIN: CPT | Performed by: INTERNAL MEDICINE

## 2025-08-01 PROCEDURE — 1123F ACP DISCUSS/DSCN MKR DOCD: CPT | Performed by: INTERNAL MEDICINE

## 2025-08-01 PROCEDURE — 3077F SYST BP >= 140 MM HG: CPT | Performed by: INTERNAL MEDICINE

## 2025-08-01 PROCEDURE — 3078F DIAST BP <80 MM HG: CPT | Performed by: INTERNAL MEDICINE

## 2025-08-01 NOTE — PROGRESS NOTES
Cardiology Office Consultation           CC: Patient is here for a follow up visit.     HPI  Armando Belcher is here for 6 months follow up.  He has significant history of coronary artery disease with PCI to 1st OM after acute MI in 2017 at [facility].  RCA was noted to be chronically occluded with collaterals. Earlier.  This year he had progressive dyspnea, stress test was ordered which was abnormal and then later had cath which showed new high grade stenosis in mid lad s/p pci. Since then he has been fairly stable. No chest pain or dyspnea.  He however reports extreme fatigue and lethargy during the daytime despite sleeping for 8 hours at night.  He recently lost his wife in November 2024 after which she has been slightly depressed however does not think depression is causing his fatigue.  Also reports dyspnea on exertion.    Past Medical:  Past Medical History:   Diagnosis Date    Acute gouty arthropathy     Acute sinusitis 02/10/2016    Backache, unspecified     Cellulitis and abscess of upper arm and forearm     Degeneration of lumbar or lumbosacral intervertebral disc     Essential hypertension, benign     Obstructive sleep apnea on CPAP     Osteoarthrosis, unspecified whether generalized or localized, unspecified site     Other and unspecified hyperlipidemia     Other malaise and fatigue     Other psoriasis     Sleep apnea     Thoracic or lumbosacral neuritis or radiculitis, unspecified     Unspecified vitamin D deficiency        Past Surgical:  Past Surgical History:   Procedure Laterality Date    CARDIAC CATHETERIZATION  2017    stent [facility]    CARDIAC PROCEDURE N/A 2/20/2024    ali / Left heart cath / coronary angiography performed by Tom Henderson MD at [facility] CARDIAC CATH LAB    CARDIAC PROCEDURE N/A 2/20/2024    Percutaneous coronary intervention performed by Tom Henderson MD at [facility] CARDIAC CATH LAB    DIAGNOSTIC CARDIAC CATH LAB PROCEDURE  02/20/2024    JOINT REPLACEMENT

## 2025-08-05 ENCOUNTER — OFFICE VISIT (OUTPATIENT)
Dept: INTERNAL MEDICINE CLINIC | Age: 80
End: 2025-08-05

## 2025-08-05 VITALS
SYSTOLIC BLOOD PRESSURE: 132 MMHG | HEART RATE: 53 BPM | WEIGHT: 235.4 LBS | DIASTOLIC BLOOD PRESSURE: 78 MMHG | OXYGEN SATURATION: 95 % | BODY MASS INDEX: 32.96 KG/M2 | HEIGHT: 71 IN

## 2025-08-05 DIAGNOSIS — R53.83 MALAISE AND FATIGUE: Primary | ICD-10-CM

## 2025-08-05 DIAGNOSIS — R55 SYNCOPE AND COLLAPSE: ICD-10-CM

## 2025-08-05 DIAGNOSIS — I25.119 CORONARY ARTERY DISEASE INVOLVING NATIVE CORONARY ARTERY OF NATIVE HEART WITH ANGINA PECTORIS: ICD-10-CM

## 2025-08-05 DIAGNOSIS — I25.5 ISCHEMIC CARDIOMYOPATHY: ICD-10-CM

## 2025-08-05 DIAGNOSIS — J61 PULMONARY ASBESTOSIS (HCC): ICD-10-CM

## 2025-08-05 DIAGNOSIS — G47.33 OBSTRUCTIVE SLEEP APNEA ON CPAP: ICD-10-CM

## 2025-08-05 DIAGNOSIS — J84.112 UIP (USUAL INTERSTITIAL PNEUMONITIS) (HCC): ICD-10-CM

## 2025-08-05 DIAGNOSIS — I50.22 CHRONIC SYSTOLIC CONGESTIVE HEART FAILURE (HCC): ICD-10-CM

## 2025-08-05 DIAGNOSIS — R53.81 MALAISE AND FATIGUE: Primary | ICD-10-CM

## 2025-08-05 PROBLEM — E66.01 MORBID (SEVERE) OBESITY DUE TO EXCESS CALORIES (HCC): Status: RESOLVED | Noted: 2025-05-12 | Resolved: 2025-08-05

## 2025-08-12 ENCOUNTER — HOSPITAL ENCOUNTER (OUTPATIENT)
Dept: VASCULAR LAB | Age: 80
Discharge: HOME | End: 2025-08-14
Attending: INTERNAL MEDICINE
Payer: MEDICARE

## 2025-08-12 DIAGNOSIS — R53.83 MALAISE AND FATIGUE: ICD-10-CM

## 2025-08-12 DIAGNOSIS — I25.119 CORONARY ARTERY DISEASE INVOLVING NATIVE CORONARY ARTERY OF NATIVE HEART WITH ANGINA PECTORIS: ICD-10-CM

## 2025-08-12 DIAGNOSIS — R53.81 MALAISE AND FATIGUE: ICD-10-CM

## 2025-08-12 DIAGNOSIS — I50.22 CHRONIC SYSTOLIC CONGESTIVE HEART FAILURE (HCC): ICD-10-CM

## 2025-08-12 DIAGNOSIS — R55 SYNCOPE AND COLLAPSE: ICD-10-CM

## 2025-08-12 LAB
VAS LEFT CCA DIST EDV: 15.4 CM/S
VAS LEFT CCA DIST PSV: 78.9 CM/S
VAS LEFT CCA MID EDV: 12.83 CM/S
VAS LEFT CCA MID PSV: 75.04 CM/S
VAS LEFT CCA PROX EDV: 7.7 CM/S
VAS LEFT CCA PROX PSV: 75 CM/S
VAS LEFT ECA EDV: 0 CM/S
VAS LEFT ECA PSV: 85.4 CM/S
VAS LEFT ICA DIST EDV: 24.1 CM/S
VAS LEFT ICA DIST PSV: 83.7 CM/S
VAS LEFT ICA MID EDV: 21.5 CM/S
VAS LEFT ICA MID PSV: 82.4 CM/S
VAS LEFT ICA PROX EDV: 21.5 CM/S
VAS LEFT ICA PROX PSV: 112.2 CM/S
VAS LEFT ICA/CCA PSV: 1.5 NO UNITS
VAS LEFT VERTEBRAL EDV: 5.2 CM/S
VAS LEFT VERTEBRAL PSV: 22.3 CM/S
VAS RIGHT CCA DIST EDV: 14.2 CM/S
VAS RIGHT CCA DIST PSV: 81.3 CM/S
VAS RIGHT CCA MID EDV: 11.99 CM/S
VAS RIGHT CCA MID PSV: 66.99 CM/S
VAS RIGHT CCA PROX EDV: 8.7 CM/S
VAS RIGHT CCA PROX PSV: 73.6 CM/S
VAS RIGHT ECA EDV: 0 CM/S
VAS RIGHT ECA PSV: 129.9 CM/S
VAS RIGHT ICA DIST EDV: 15.3 CM/S
VAS RIGHT ICA DIST PSV: 61.5 CM/S
VAS RIGHT ICA MID EDV: 17.5 CM/S
VAS RIGHT ICA MID PSV: 68.1 CM/S
VAS RIGHT ICA PROX EDV: 15.4 CM/S
VAS RIGHT ICA PROX PSV: 60.8 CM/S
VAS RIGHT ICA/CCA PSV: 1 NO UNITS
VAS RIGHT VERTEBRAL EDV: 11.99 CM/S
VAS RIGHT VERTEBRAL PSV: 46.1 CM/S

## 2025-08-12 PROCEDURE — 93880 EXTRACRANIAL BILAT STUDY: CPT

## 2025-08-12 PROCEDURE — 93880 EXTRACRANIAL BILAT STUDY: CPT | Performed by: SURGERY

## 2025-08-14 ENCOUNTER — TELEPHONE (OUTPATIENT)
Age: 80
End: 2025-08-14

## 2025-08-18 ENCOUNTER — TELEPHONE (OUTPATIENT)
Dept: INTERNAL MEDICINE CLINIC | Age: 80
End: 2025-08-18

## 2025-08-20 ENCOUNTER — HOSPITAL ENCOUNTER (OUTPATIENT)
Age: 80
Discharge: HOME | End: 2025-08-22
Attending: INTERNAL MEDICINE
Payer: MEDICARE

## 2025-08-20 ENCOUNTER — HOSPITAL ENCOUNTER (OUTPATIENT)
Dept: NUCLEAR MEDICINE | Age: 80
Discharge: HOME | End: 2025-08-22
Attending: INTERNAL MEDICINE
Payer: MEDICARE

## 2025-08-20 VITALS — SYSTOLIC BLOOD PRESSURE: 189 MMHG | DIASTOLIC BLOOD PRESSURE: 100 MMHG | HEART RATE: 64 BPM

## 2025-08-20 DIAGNOSIS — R06.02 SHORTNESS OF BREATH: ICD-10-CM

## 2025-08-20 LAB
ECHO AO ROOT DIAM: 3 CM
ECHO AV AREA PEAK VELOCITY: 1.9 CM2
ECHO AV AREA VTI: 2.2 CM2
ECHO AV MEAN GRADIENT: 4 MMHG
ECHO AV MEAN VELOCITY: 0.9 M/S
ECHO AV PEAK GRADIENT: 9 MMHG
ECHO AV PEAK VELOCITY: 1.5 M/S
ECHO AV VELOCITY RATIO: 0.47
ECHO AV VTI: 30.8 CM
ECHO EST RA PRESSURE: 3 MMHG
ECHO LA AREA 2C: 20.3 CM2
ECHO LA AREA 4C: 19.9 CM2
ECHO LA DIAMETER: 4.8 CM
ECHO LA MAJOR AXIS: 5.4 CM
ECHO LA MINOR AXIS: 5.9 CM
ECHO LA TO AORTIC ROOT RATIO: 1.6
ECHO LA VOL BP: 61 ML (ref 18–58)
ECHO LA VOL MOD A2C: 58 ML (ref 18–58)
ECHO LA VOL MOD A4C: 59 ML (ref 18–58)
ECHO LV E' LATERAL VELOCITY: 7.29 CM/S
ECHO LV E' SEPTAL VELOCITY: 3.59 CM/S
ECHO LV EDV 3D: 207 ML
ECHO LV EF PHYSICIAN: 50 %
ECHO LV ESV 3D: 129 ML
ECHO LV FRACTIONAL SHORTENING: 22 % (ref 28–44)
ECHO LV GLOBAL LONGITUDINAL STRAIN (GLS): -10.8 %
ECHO LV INTERNAL DIMENSION DIASTOLIC: 5 CM (ref 4.2–5.9)
ECHO LV INTERNAL DIMENSION SYSTOLIC: 3.9 CM
ECHO LV IVSD: 1.1 CM (ref 0.6–1)
ECHO LV MASS 2D: 207.1 G (ref 88–224)
ECHO LV MASS 3D: 223 G
ECHO LV POSTERIOR WALL DIASTOLIC: 1.1 CM (ref 0.6–1)
ECHO LV RELATIVE WALL THICKNESS RATIO: 0.44
ECHO LVOT AREA: 3.8 CM2
ECHO LVOT AV VTI INDEX: 0.59
ECHO LVOT DIAM: 2.2 CM
ECHO LVOT MEAN GRADIENT: 1 MMHG
ECHO LVOT PEAK GRADIENT: 2 MMHG
ECHO LVOT PEAK VELOCITY: 0.7 M/S
ECHO LVOT SV: 68.8 ML
ECHO LVOT VTI: 18.1 CM
ECHO MV A VELOCITY: 1.22 M/S
ECHO MV AREA VTI: 2.1 CM2
ECHO MV E DECELERATION TIME (DT): 250 MS
ECHO MV E VELOCITY: 0.84 M/S
ECHO MV E/A RATIO: 0.69
ECHO MV E/E' LATERAL: 11.52
ECHO MV E/E' RATIO (AVERAGED): 17.46
ECHO MV E/E' SEPTAL: 23.4
ECHO MV LVOT VTI INDEX: 1.83
ECHO MV MAX VELOCITY: 1.4 M/S
ECHO MV MEAN GRADIENT: 3 MMHG
ECHO MV MEAN VELOCITY: 0.8 M/S
ECHO MV PEAK GRADIENT: 7 MMHG
ECHO MV VTI: 33.1 CM
ECHO PV MAX VELOCITY: 1.2 M/S
ECHO PV PEAK GRADIENT: 5 MMHG
ECHO PVEIN A VELOCITY: 0.3 M/S
ECHO RA AREA 4C: 16.1 CM2
ECHO RA VOLUME: 40 ML
ECHO RV BASAL DIMENSION: 4.1 CM
ECHO RV FREE WALL PEAK S': 11.7 CM/S
ECHO RV TAPSE: 2.4 CM (ref 1.7–?)
STRESS BASELINE DIAS BP: 101 MMHG
STRESS BASELINE HR: 63 BPM
STRESS BASELINE SYS BP: 191 MMHG
STRESS ESTIMATED WORKLOAD: 1 METS
STRESS PEAK DIAS BP: 101 MMHG
STRESS PEAK SYS BP: 191 MMHG
STRESS PERCENT HR ACHIEVED: 60 %
STRESS POST PEAK HR: 83 BPM
STRESS RATE PRESSURE PRODUCT: NORMAL BPM*MMHG
STRESS TARGET HR: 138 BPM

## 2025-08-20 PROCEDURE — 2500000003 HC RX 250 WO HCPCS: Performed by: INTERNAL MEDICINE

## 2025-08-20 PROCEDURE — 93306 TTE W/DOPPLER COMPLETE: CPT

## 2025-08-20 PROCEDURE — 6360000002 HC RX W HCPCS: Performed by: INTERNAL MEDICINE

## 2025-08-20 PROCEDURE — 78452 HT MUSCLE IMAGE SPECT MULT: CPT

## 2025-08-20 PROCEDURE — 3430000000 HC RX DIAGNOSTIC RADIOPHARMACEUTICAL: Performed by: INTERNAL MEDICINE

## 2025-08-20 PROCEDURE — 93017 CV STRESS TEST TRACING ONLY: CPT

## 2025-08-20 PROCEDURE — A9500 TC99M SESTAMIBI: HCPCS | Performed by: INTERNAL MEDICINE

## 2025-08-20 RX ORDER — METOPROLOL TARTRATE 1 MG/ML
5 INJECTION, SOLUTION INTRAVENOUS EVERY 5 MIN PRN
Status: DISCONTINUED | OUTPATIENT
Start: 2025-08-20 | End: 2025-08-20

## 2025-08-20 RX ORDER — SODIUM CHLORIDE 0.9 % (FLUSH) 0.9 %
10 SYRINGE (ML) INJECTION PRN
Status: DISCONTINUED | OUTPATIENT
Start: 2025-08-20 | End: 2025-08-23 | Stop reason: HOSPADM

## 2025-08-20 RX ORDER — ATROPINE SULFATE 0.1 MG/ML
0.5 INJECTION INTRAVENOUS EVERY 5 MIN PRN
Status: DISCONTINUED | OUTPATIENT
Start: 2025-08-20 | End: 2025-08-20

## 2025-08-20 RX ORDER — TETRAKIS(2-METHOXYISOBUTYLISOCYANIDE)COPPER(I) TETRAFLUOROBORATE 1 MG/ML
15.5 INJECTION, POWDER, LYOPHILIZED, FOR SOLUTION INTRAVENOUS
Status: COMPLETED | OUTPATIENT
Start: 2025-08-20 | End: 2025-08-20

## 2025-08-20 RX ORDER — TETRAKIS(2-METHOXYISOBUTYLISOCYANIDE)COPPER(I) TETRAFLUOROBORATE 1 MG/ML
42 INJECTION, POWDER, LYOPHILIZED, FOR SOLUTION INTRAVENOUS
Status: COMPLETED | OUTPATIENT
Start: 2025-08-20 | End: 2025-08-20

## 2025-08-20 RX ORDER — SODIUM CHLORIDE 9 MG/ML
500 INJECTION, SOLUTION INTRAVENOUS CONTINUOUS PRN
Status: DISCONTINUED | OUTPATIENT
Start: 2025-08-20 | End: 2025-08-20

## 2025-08-20 RX ORDER — NITROGLYCERIN 0.4 MG/1
0.4 TABLET SUBLINGUAL EVERY 5 MIN PRN
Status: DISCONTINUED | OUTPATIENT
Start: 2025-08-20 | End: 2025-08-20

## 2025-08-20 RX ORDER — SODIUM CHLORIDE 0.9 % (FLUSH) 0.9 %
5-40 SYRINGE (ML) INJECTION PRN
Status: DISCONTINUED | OUTPATIENT
Start: 2025-08-20 | End: 2025-08-20

## 2025-08-20 RX ORDER — REGADENOSON 0.08 MG/ML
0.4 INJECTION, SOLUTION INTRAVENOUS
Status: COMPLETED | OUTPATIENT
Start: 2025-08-20 | End: 2025-08-20

## 2025-08-20 RX ORDER — AMINOPHYLLINE 25 MG/ML
50 INJECTION, SOLUTION INTRAVENOUS PRN
Status: DISCONTINUED | OUTPATIENT
Start: 2025-08-20 | End: 2025-08-20

## 2025-08-20 RX ORDER — ALBUTEROL SULFATE 90 UG/1
2 INHALANT RESPIRATORY (INHALATION) PRN
Status: DISCONTINUED | OUTPATIENT
Start: 2025-08-20 | End: 2025-08-20

## 2025-08-20 RX ADMIN — SODIUM CHLORIDE, PRESERVATIVE FREE 10 ML: 5 INJECTION INTRAVENOUS at 09:39

## 2025-08-20 RX ADMIN — REGADENOSON 0.4 MG: 0.08 INJECTION, SOLUTION INTRAVENOUS at 10:24

## 2025-08-20 RX ADMIN — SODIUM CHLORIDE, PRESERVATIVE FREE 10 ML: 5 INJECTION INTRAVENOUS at 08:34

## 2025-08-20 RX ADMIN — SODIUM CHLORIDE, PRESERVATIVE FREE 10 ML: 5 INJECTION INTRAVENOUS at 10:24

## 2025-08-20 RX ADMIN — TETRAKIS(2-METHOXYISOBUTYLISOCYANIDE)COPPER(I) TETRAFLUOROBORATE 42 MILLICURIE: 1 INJECTION, POWDER, LYOPHILIZED, FOR SOLUTION INTRAVENOUS at 10:25

## 2025-08-20 RX ADMIN — TETRAKIS(2-METHOXYISOBUTYLISOCYANIDE)COPPER(I) TETRAFLUOROBORATE 15.5 MILLICURIE: 1 INJECTION, POWDER, LYOPHILIZED, FOR SOLUTION INTRAVENOUS at 08:34

## 2025-08-20 RX ADMIN — SODIUM CHLORIDE, PRESERVATIVE FREE 10 ML: 5 INJECTION INTRAVENOUS at 10:25

## 2025-08-21 ENCOUNTER — TELEPHONE (OUTPATIENT)
Dept: INTERNAL MEDICINE CLINIC | Age: 80
End: 2025-08-21

## 2025-08-27 RX ORDER — ATORVASTATIN CALCIUM 20 MG/1
20 TABLET, FILM COATED ORAL DAILY
Qty: 90 TABLET | Refills: 0 | Status: SHIPPED | OUTPATIENT
Start: 2025-08-27

## (undated) DEVICE — SURGICAL PROCEDURE TRAY CRD CATH SVMMC

## (undated) DEVICE — CATHETER GUID 6FR L100CM GRN PTFE XBLAD3.5 TRUELUMEN HYBRID

## (undated) DEVICE — ANGIOGRAPHIC CATHETER: Brand: EXPO™

## (undated) DEVICE — GUIDEWIRE VASC STR 3 CM 0.014 INX180 CM SS PROWATERFLEX PTCA

## (undated) DEVICE — CATH BLLN ANGIO 3X15MM SC EUPHORA RX